# Patient Record
Sex: FEMALE | Race: WHITE | NOT HISPANIC OR LATINO | Employment: UNEMPLOYED | URBAN - METROPOLITAN AREA
[De-identification: names, ages, dates, MRNs, and addresses within clinical notes are randomized per-mention and may not be internally consistent; named-entity substitution may affect disease eponyms.]

---

## 2017-01-06 ENCOUNTER — HOSPITAL ENCOUNTER (OUTPATIENT)
Dept: RADIOLOGY | Facility: OTHER | Age: 27
Discharge: HOME | End: 2017-01-06

## 2017-01-06 ENCOUNTER — ALLSCRIPTS OFFICE VISIT (OUTPATIENT)
Dept: OTHER | Facility: OTHER | Age: 27
End: 2017-01-06

## 2017-01-06 DIAGNOSIS — S52.512A: ICD-10-CM

## 2017-01-06 PROCEDURE — 73110 X-RAY EXAM OF WRIST: CPT

## 2017-01-27 ENCOUNTER — ALLSCRIPTS OFFICE VISIT (OUTPATIENT)
Dept: OTHER | Facility: OTHER | Age: 27
End: 2017-01-27

## 2017-03-27 ENCOUNTER — HOSPITAL ENCOUNTER (OUTPATIENT)
Dept: RADIOLOGY | Facility: OTHER | Age: 27
Discharge: HOME | End: 2017-03-27

## 2017-03-27 ENCOUNTER — ALLSCRIPTS OFFICE VISIT (OUTPATIENT)
Dept: OTHER | Facility: OTHER | Age: 27
End: 2017-03-27

## 2017-03-27 DIAGNOSIS — S52.512A: ICD-10-CM

## 2017-03-27 PROCEDURE — 73110 X-RAY EXAM OF WRIST: CPT

## 2017-03-28 ENCOUNTER — ALLSCRIPTS OFFICE VISIT (OUTPATIENT)
Dept: OTHER | Facility: OTHER | Age: 27
End: 2017-03-28

## 2017-04-05 ENCOUNTER — GENERIC CONVERSION - ENCOUNTER (OUTPATIENT)
Dept: OTHER | Facility: OTHER | Age: 27
End: 2017-04-05

## 2017-05-12 ENCOUNTER — ALLSCRIPTS OFFICE VISIT (OUTPATIENT)
Dept: OTHER | Facility: OTHER | Age: 27
End: 2017-05-12

## 2017-05-12 DIAGNOSIS — Z13.220 ENCOUNTER FOR SCREENING FOR LIPOID DISORDERS: ICD-10-CM

## 2017-05-12 DIAGNOSIS — Z13.1 ENCOUNTER FOR SCREENING FOR DIABETES MELLITUS: ICD-10-CM

## 2017-06-05 ENCOUNTER — ALLSCRIPTS OFFICE VISIT (OUTPATIENT)
Dept: OTHER | Facility: OTHER | Age: 27
End: 2017-06-05

## 2017-08-21 ENCOUNTER — ALLSCRIPTS OFFICE VISIT (OUTPATIENT)
Dept: OTHER | Facility: OTHER | Age: 27
End: 2017-08-21

## 2017-12-20 ENCOUNTER — ALLSCRIPTS OFFICE VISIT (OUTPATIENT)
Dept: OTHER | Facility: OTHER | Age: 27
End: 2017-12-20

## 2018-01-13 VITALS
BODY MASS INDEX: 43.49 KG/M2 | WEIGHT: 261.02 LBS | SYSTOLIC BLOOD PRESSURE: 123 MMHG | DIASTOLIC BLOOD PRESSURE: 89 MMHG | HEART RATE: 75 BPM | HEIGHT: 65 IN

## 2018-01-13 VITALS
TEMPERATURE: 96.2 F | HEIGHT: 65 IN | SYSTOLIC BLOOD PRESSURE: 116 MMHG | OXYGEN SATURATION: 99 % | BODY MASS INDEX: 42.32 KG/M2 | RESPIRATION RATE: 18 BRPM | WEIGHT: 254 LBS | DIASTOLIC BLOOD PRESSURE: 78 MMHG | HEART RATE: 88 BPM

## 2018-01-13 VITALS
BODY MASS INDEX: 43.1 KG/M2 | WEIGHT: 259 LBS | SYSTOLIC BLOOD PRESSURE: 117 MMHG | DIASTOLIC BLOOD PRESSURE: 81 MMHG | HEART RATE: 72 BPM

## 2018-01-14 VITALS
DIASTOLIC BLOOD PRESSURE: 74 MMHG | WEIGHT: 257.25 LBS | SYSTOLIC BLOOD PRESSURE: 109 MMHG | BODY MASS INDEX: 43.92 KG/M2 | HEIGHT: 64 IN | HEART RATE: 86 BPM

## 2018-01-14 VITALS
BODY MASS INDEX: 43.15 KG/M2 | DIASTOLIC BLOOD PRESSURE: 72 MMHG | RESPIRATION RATE: 16 BRPM | HEART RATE: 84 BPM | WEIGHT: 251.38 LBS | SYSTOLIC BLOOD PRESSURE: 112 MMHG

## 2018-01-14 VITALS
HEART RATE: 109 BPM | WEIGHT: 254 LBS | HEIGHT: 65 IN | DIASTOLIC BLOOD PRESSURE: 68 MMHG | BODY MASS INDEX: 42.32 KG/M2 | SYSTOLIC BLOOD PRESSURE: 104 MMHG

## 2018-01-16 NOTE — PROGRESS NOTES
Assessment    1  Depression, major, recurrent, moderate (296 32) (F33 1)   2  Encounter for preventive health examination (V70 0) (Z00 00)   3  Family history of Benign hypertension : Mother   4  No pertinent family history : Father   11  History of  Section Low Transverse   6  Diabetes mellitus screening (V77 1) (Z13 1)   7  Need for lipid screening (V77 91) (Z13 220)    Plan  Depression, major, recurrent, moderate    · Sertraline HCl - 50 MG Oral Tablet; Take 1 tablet daily  Diabetes mellitus screening, Need for lipid screening    · (1) GLUCOSE,  FASTING; Status:Active; Requested for:2017;    · (1) LIPID PANEL, FASTING; Status:Active; Requested for:2017;    Health Maintenance    · Follow-up visit in 1 year Evaluation and Treatment  Follow-up  Status: Hold For -  Scheduling  Requested for: 13EOM9181   · Begin a limited exercise program ; Status:Complete;   Done: 51RZG8140   · Brush your teeth 3 times a day and floss at least once a day ; Status:Complete;   Done:  23HBU0397   · Drink plenty of fluids ; Status:Complete;   Done: 96MSW4303   · Eat a low fat and low cholesterol diet ; Status:Complete;   Done: 05OJE3621   · Eat a normal well-balanced diet ; Status:Complete;   Done: 42QNM4140   · Eat foods that are high in calcium ; Status:Complete;   Done: 96MGD4204   · Stretch and warm up your muscles during the first 10 minutes , then cool down your  muscles for the last 10 minutes of exercise ; Status:Complete;   Done: 76MHV4297   · Use a sun block product with an SPF of 15 or more ; Status:Complete;   Done:  14RXA0582   · Vitamins can help you get daily requirements that your diet may not be giving you ;  Status:Complete;   Done: 89OBS7471   · We recommend routine visits to a dentist ; Status:Complete;   Done: 22YVT1382   · We recommend that you bring your body mass index down to 26 ; Status:Complete;    Done: 52KLC5846   · Call [phone] if: You find a new or different kind of lump in your breast ;  Status:Complete;   Done: 56RPE9694   · Call [phone] if: You have any warning signs of skin cancer ; Status:Complete;    Done: 97STS0058    Discussion/Summary  health maintenance visit Currently, she eats an adequate diet  the risks and benefits of cervical cancer screening were discussed cervical cancer screening is needed every three years Breast cancer screening: the risks and benefits of breast cancer screening were discussed and breast cancer screening is not indicated  Colorectal cancer screening: the risks and benefits of colorectal cancer screening were discussed and colorectal cancer screening is not indicated  Osteoporosis screening: the risks and benefits of osteoporosis screening were discussed and bone mineral density testing is not indicated  Screening lab work includes glucose and lipid profile  The immunizations are up to date  She was advised to be evaluated by an ophthalmologist and a dentist  Advice and education were given regarding nutrition, weight loss, calcium supplements and vitamin D supplements  Patient discussion: discussed with the patient  Chief Complaint  PT here for Annual Wellness Visit      History of Present Illness  HM, Adult Female: The patient is being seen for a health maintenance evaluation  The last health maintenance visit was 1 year(s) ago  General Health: The patient's health since the last visit is described as good  She has regular dental visits  She denies vision problems  She denies hearing loss  Immunizations status: not up to date  Lifestyle:  She consumes a diverse and healthy diet  She does not have any weight concerns  She does not exercise regularly  She does not use tobacco  She denies alcohol use  She denies drug use  Reproductive health:  she reports normal menses  she uses contraception  For contraception, she has an intrauterine device  she is sexually active  pregnancy history: G 1P 2, 2     Screening: cancer screening reviewed and updated  metabolic screening reviewed and updated  risk screening reviewed and updated  Review of Systems    Constitutional: No fever, no chills, feels well, no tiredness, no recent weight gain or weight loss  Eyes: No complaints of eye pain, no red eyes, no eyesight problems, no discharge, no dry eyes, no itching of eyes  ENT: no complaints of earache, no loss of hearing, no nose bleeds, no nasal discharge, no sore throat, no hoarseness  Cardiovascular: No complaints of slow heart rate, no fast heart rate, no chest pain, no palpitations, no leg claudication, no lower extremity edema  Respiratory: No complaints of shortness of breath, no wheezing, no cough, no SOB on exertion, no orthopnea, no PND  Gastrointestinal: No complaints of abdominal pain, no constipation, no nausea or vomiting, no diarrhea, no bloody stools  Genitourinary: No complaints of dysuria, no incontinence, no pelvic pain, no dysmenorrhea, no vaginal discharge or bleeding  Musculoskeletal: No complaints of arthralgias, no myalgias, no joint swelling or stiffness, no limb pain or swelling  Integumentary: No complaints of skin rash or lesions, no itching, no skin wounds, no breast pain or lump  Neurological: No complaints of headache, no confusion, no convulsions, no numbness, no dizziness or fainting, no tingling, no limb weakness, no difficulty walking  Psychiatric: Not suicidal, no sleep disturbance, no anxiety or depression, no change in personality, no emotional problems  Endocrine: No complaints of proptosis, no hot flashes, no muscle weakness, no deepening of the voice, no feelings of weakness  Hematologic/Lymphatic: No complaints of swollen glands, no swollen glands in the neck, does not bleed easily, does not bruise easily        Past Medical History    · History of Closed displaced fracture of styloid process of left radius with delayed healing  (V54 12) (S52 512G)    Surgical History    · History of  Section Low Transverse    Family History  Mother    · Family history of Benign hypertension  Father    · No pertinent family history    Social History    · Current every day smoker (305 1) (F17 200)   · Social alcohol use (Z78 9)    Allergies    1  No Known Drug Allergies    Vitals   Recorded: 23VKS5320 01:18PM   Heart Rate 84   Respiration 16   Systolic 785   Diastolic 76   Height 5 ft 4 72 in   Weight 257 lb    BMI Calculated 43 14   BSA Calculated 2 19     Physical Exam    Constitutional   General appearance: No acute distress, well appearing and well nourished  Head and Face   Head and face: Normal     Palpation of the face and sinuses: No sinus tenderness  Eyes   Conjunctiva and lids: No swelling, erythema or discharge  Pupils and irises: Equal, round, reactive to light  Ophthalmoscopic examination: Normal fundi and optic discs  Ears, Nose, Mouth, and Throat   External inspection of ears and nose: Normal     Otoscopic examination: Tympanic membranes translucent with normal light reflex  Canals patent without erythema  Hearing: Normal     Nasal mucosa, septum, and turbinates: Normal without edema or erythema  Lips, teeth, and gums: Normal, good dentition  Oropharynx: Normal with no erythema, edema, exudate or lesions  Neck   Neck: Supple, symmetric, trachea midline, no masses  Thyroid: Normal, no thyromegaly  Pulmonary   Respiratory effort: No increased work of breathing or signs of respiratory distress  Percussion of chest: Normal     Auscultation of lungs: Clear to auscultation  Cardiovascular   Palpation of heart: Normal PMI, no thrills  Auscultation of heart: Normal rate and rhythm, normal S1 and S2, no murmurs  Examination of extremities for edema and/or varicosities: Normal     Chest   Chest: Normal     Abdomen   Abdomen: Non-tender, no masses  Liver and spleen: No hepatomegaly or splenomegaly  Examination for hernias: No hernia appreciated      Lymphatic Palpation of lymph nodes in neck: No lymphadenopathy  Palpation of lymph nodes in axillae: No lymphadenopathy  Musculoskeletal   Gait and station: Normal     Digits and nails: Normal without clubbing or cyanosis  Joints, bones, and muscles: Normal     Range of motion: Normal     Stability: Normal     Muscle strength/tone: Normal     Skin   Skin and subcutaneous tissue: Normal without rashes or lesions  Palpation of skin and subcutaneous tissue: Normal turgor  Neurologic   Cranial nerves: Cranial nerves II-XII intact  Cortical function: Normal mental status  Reflexes: 2+ and symmetric  Sensation: No sensory loss  Coordination: Normal finger to nose and heel to shin  Psychiatric   Judgment and insight: Normal     Orientation to person, place, and time: Normal     Recent and remote memory: Intact  Mood and affect: Normal        Results/Data  PHQ-2 Adult Depression Screening [id number] 01:20PM User, Stephen     Test Name Result Flag Reference   PHQ-2 Adult Depression Score 0     Over the last two weeks, how often have you been bothered by any of the following problems? Little interest or pleasure in doing things: Not at all - 0  Feeling down, depressed, or hopeless: Not at all - 0   PHQ-2 Adult Depression Screening Negative         Future Appointments    Date/Time Provider Specialty Site   06/05/2017 09:20 AM HIEU Yadav   Orthopedic Surgery [facility]     Signatures   Electronically signed by : Thania Leslie MD; May 12 2017  1:46PM EST                       (Author)

## 2018-01-16 NOTE — MISCELLANEOUS
Provider Comments  Provider Comments:   Our records indicate that you missed an appointment on 04/04/2017  If you have not done so already, please call our office and we will be happy to schedule another appointment for you  If you must cancel your appointment, our office policy requires you to call our office at least 24 hours in advance so that we may utilize your appointment time for another patient who requires our services  If you have any questions, please contact our office at [phone]  Signatures   Electronically signed by :  Helga Jarvis, ; Apr 5 2017  3:43PM EST                       (Author)

## 2018-01-22 VITALS
BODY MASS INDEX: 42.82 KG/M2 | DIASTOLIC BLOOD PRESSURE: 76 MMHG | WEIGHT: 257 LBS | HEART RATE: 84 BPM | HEIGHT: 65 IN | SYSTOLIC BLOOD PRESSURE: 118 MMHG | RESPIRATION RATE: 16 BRPM

## 2018-01-22 VITALS
WEIGHT: 246.5 LBS | DIASTOLIC BLOOD PRESSURE: 82 MMHG | SYSTOLIC BLOOD PRESSURE: 126 MMHG | RESPIRATION RATE: 16 BRPM | HEART RATE: 80 BPM | TEMPERATURE: 97.3 F | BODY MASS INDEX: 42.08 KG/M2 | HEIGHT: 64 IN

## 2018-07-23 PROBLEM — F33.1 DEPRESSION, MAJOR, RECURRENT, MODERATE (HCC): Status: ACTIVE | Noted: 2017-05-12

## 2019-03-25 ENCOUNTER — OFFICE VISIT (OUTPATIENT)
Dept: FAMILY MEDICINE CLINIC | Facility: CLINIC | Age: 29
End: 2019-03-25
Payer: COMMERCIAL

## 2019-03-25 VITALS
HEART RATE: 84 BPM | RESPIRATION RATE: 16 BRPM | OXYGEN SATURATION: 98 % | HEIGHT: 66 IN | DIASTOLIC BLOOD PRESSURE: 68 MMHG | TEMPERATURE: 97.2 F | SYSTOLIC BLOOD PRESSURE: 106 MMHG | WEIGHT: 255 LBS | BODY MASS INDEX: 40.98 KG/M2

## 2019-03-25 DIAGNOSIS — F33.1 DEPRESSION, MAJOR, RECURRENT, MODERATE (HCC): ICD-10-CM

## 2019-03-25 DIAGNOSIS — J01.00 ACUTE NON-RECURRENT MAXILLARY SINUSITIS: Primary | ICD-10-CM

## 2019-03-25 DIAGNOSIS — Z23 NEED FOR VACCINATION: ICD-10-CM

## 2019-03-25 PROCEDURE — 90471 IMMUNIZATION ADMIN: CPT

## 2019-03-25 PROCEDURE — 90686 IIV4 VACC NO PRSV 0.5 ML IM: CPT

## 2019-03-25 PROCEDURE — 99213 OFFICE O/P EST LOW 20 MIN: CPT | Performed by: FAMILY MEDICINE

## 2019-03-25 PROCEDURE — 3008F BODY MASS INDEX DOCD: CPT | Performed by: FAMILY MEDICINE

## 2019-03-25 PROCEDURE — 1036F TOBACCO NON-USER: CPT | Performed by: FAMILY MEDICINE

## 2019-03-25 RX ORDER — MECLIZINE HYDROCHLORIDE 25 MG/1
25 TABLET ORAL EVERY 8 HOURS PRN
Qty: 30 TABLET | Refills: 0 | Status: SHIPPED | OUTPATIENT
Start: 2019-03-25 | End: 2020-07-04

## 2019-03-25 RX ORDER — AMOXICILLIN AND CLAVULANATE POTASSIUM 875; 125 MG/1; MG/1
1 TABLET, FILM COATED ORAL EVERY 12 HOURS SCHEDULED
Qty: 14 TABLET | Refills: 0 | Status: SHIPPED | OUTPATIENT
Start: 2019-03-25 | End: 2019-04-01

## 2019-03-25 NOTE — PROGRESS NOTES
Assessment/Plan:         Diagnoses and all orders for this visit:    Acute non-recurrent maxillary sinusitis  -     amoxicillin-clavulanate (AUGMENTIN) 875-125 mg per tablet; Take 1 tablet by mouth every 12 (twelve) hours for 7 days  -     meclizine (ANTIVERT) 25 mg tablet; Take 1 tablet (25 mg total) by mouth every 8 (eight) hours as needed for dizziness    Need for vaccination  -     SYRINGE/SINGLE-DOSE VIAL: influenza vaccine, [id number], quadrivalent, 0 5 mL, preservative-free (AFLURIA, FLUARIX, FLULAVAL, FLUZONE)    Depression, major, recurrent, moderate (HCC)  -     sertraline (ZOLOFT) 50 mg tablet; Take 1 5 tablets (75 mg total) by mouth daily    Other orders  -     Discontinue: sertraline (ZOLOFT) 50 mg tablet; TAKE ONE AND ONE-HALF TABLETS BY MOUTH DAILY  -     Discontinue: sertraline (ZOLOFT) 50 mg tablet; TAKE ONE AND ONE-HALF TABLETS BY MOUTH DAILY          Subjective:      Patient ID: Joi Shepherd is a 29 y o  female  Dizziness   This is a new problem  The current episode started 1 to 4 weeks ago  The problem occurs constantly  Associated symptoms include congestion, a sore throat and vertigo  Pertinent negatives include no abdominal pain, anorexia, arthralgias, change in bowel habit, chest pain, chills, coughing, diaphoresis, fever, headaches, joint swelling, myalgias, nausea, neck pain, numbness, rash, swollen glands, urinary symptoms, visual change, vomiting or weakness  The symptoms are aggravated by eating and drinking  She has tried NSAIDs, sleep and rest for the symptoms  The treatment provided mild relief  The following portions of the patient's history were reviewed and updated as appropriate: allergies, current medications, past family history, past medical history, past social history, past surgical history and problem list     Review of Systems   Constitutional: Negative for chills, diaphoresis and fever  HENT: Positive for congestion and sore throat      Respiratory: Negative for cough  Cardiovascular: Negative for chest pain  Gastrointestinal: Negative for abdominal pain, anorexia, change in bowel habit, nausea and vomiting  Musculoskeletal: Negative for arthralgias, joint swelling, myalgias and neck pain  Skin: Negative for rash  Neurological: Positive for dizziness and vertigo  Negative for weakness, numbness and headaches  Objective:      /68   Pulse 84   Temp (!) 97 2 °F (36 2 °C)   Resp 16   Ht 5' 6" (1 676 m)   Wt 116 kg (255 lb)   LMP 03/16/2019   SpO2 98%   BMI 41 16 kg/m²          Physical Exam   Constitutional: She appears well-developed and well-nourished  HENT:   Head: Normocephalic and atraumatic  Nose: Rhinorrhea and sinus tenderness present  Right sinus exhibits maxillary sinus tenderness  Mouth/Throat: No oropharyngeal exudate  Eyes: Pupils are equal, round, and reactive to light  EOM are normal    Cardiovascular: Normal rate and regular rhythm  Pulmonary/Chest: Breath sounds normal  No stridor  No respiratory distress  Musculoskeletal: Normal range of motion

## 2019-05-01 ENCOUNTER — OFFICE VISIT (OUTPATIENT)
Dept: FAMILY MEDICINE CLINIC | Facility: CLINIC | Age: 29
End: 2019-05-01
Payer: COMMERCIAL

## 2019-05-01 VITALS
RESPIRATION RATE: 16 BRPM | HEART RATE: 67 BPM | WEIGHT: 260.8 LBS | BODY MASS INDEX: 41.91 KG/M2 | TEMPERATURE: 97.5 F | SYSTOLIC BLOOD PRESSURE: 120 MMHG | OXYGEN SATURATION: 99 % | HEIGHT: 66 IN | DIASTOLIC BLOOD PRESSURE: 88 MMHG

## 2019-05-01 DIAGNOSIS — Z00.00 ANNUAL PHYSICAL EXAM: Primary | ICD-10-CM

## 2019-05-01 PROCEDURE — 99395 PREV VISIT EST AGE 18-39: CPT | Performed by: FAMILY MEDICINE

## 2019-12-17 ENCOUNTER — TELEPHONE (OUTPATIENT)
Dept: FAMILY MEDICINE CLINIC | Facility: CLINIC | Age: 29
End: 2019-12-17

## 2019-12-17 NOTE — TELEPHONE ENCOUNTER
Patient called and had to cancel her sick appointment with you today due to her kids being sick and she wanted to know If you can squeeze her in tomorrow morning   Please advise

## 2019-12-18 ENCOUNTER — OFFICE VISIT (OUTPATIENT)
Dept: FAMILY MEDICINE CLINIC | Facility: CLINIC | Age: 29
End: 2019-12-18
Payer: COMMERCIAL

## 2019-12-18 VITALS
OXYGEN SATURATION: 98 % | HEART RATE: 54 BPM | HEIGHT: 66 IN | RESPIRATION RATE: 18 BRPM | DIASTOLIC BLOOD PRESSURE: 80 MMHG | BODY MASS INDEX: 41.5 KG/M2 | WEIGHT: 258.2 LBS | TEMPERATURE: 97.2 F | SYSTOLIC BLOOD PRESSURE: 116 MMHG

## 2019-12-18 DIAGNOSIS — F33.1 DEPRESSION, MAJOR, RECURRENT, MODERATE (HCC): ICD-10-CM

## 2019-12-18 DIAGNOSIS — Z23 FLU VACCINE NEED: ICD-10-CM

## 2019-12-18 DIAGNOSIS — J20.9 ACUTE BRONCHITIS, UNSPECIFIED ORGANISM: Primary | ICD-10-CM

## 2019-12-18 PROCEDURE — 90471 IMMUNIZATION ADMIN: CPT

## 2019-12-18 PROCEDURE — 1036F TOBACCO NON-USER: CPT | Performed by: FAMILY MEDICINE

## 2019-12-18 PROCEDURE — 3008F BODY MASS INDEX DOCD: CPT | Performed by: FAMILY MEDICINE

## 2019-12-18 PROCEDURE — 99213 OFFICE O/P EST LOW 20 MIN: CPT | Performed by: FAMILY MEDICINE

## 2019-12-18 PROCEDURE — 90686 IIV4 VACC NO PRSV 0.5 ML IM: CPT

## 2019-12-18 RX ORDER — AZITHROMYCIN 250 MG/1
TABLET, FILM COATED ORAL
Qty: 6 TABLET | Refills: 0 | Status: SHIPPED | OUTPATIENT
Start: 2019-12-18 | End: 2019-12-22

## 2019-12-18 RX ORDER — DEXTROMETHORPHAN HYDROBROMIDE AND PROMETHAZINE HYDROCHLORIDE 15; 6.25 MG/5ML; MG/5ML
5 SOLUTION ORAL 4 TIMES DAILY PRN
Qty: 240 ML | Refills: 0 | Status: SHIPPED | OUTPATIENT
Start: 2019-12-18 | End: 2020-07-04

## 2019-12-18 NOTE — PROGRESS NOTES
Assessment/Plan:    No problem-specific Assessment & Plan notes found for this encounter  Diagnoses and all orders for this visit:    Acute bronchitis, unspecified organism  -     azithromycin (ZITHROMAX) 250 mg tablet; 2 tabs x 1 day, 1 tab x 4 days  -     Promethazine-DM (PHENERGAN-DM) 6 25-15 mg/5 mL oral syrup; Take 5 mL by mouth 4 (four) times a day as needed for cough    Depression, major, recurrent, moderate (HCC)  -     sertraline (ZOLOFT) 50 mg tablet; Take 1 5 tablets (75 mg total) by mouth daily    Flu vaccine need  -     influenza vaccine, [id number], quadrivalent, 0 5 mL, preservative-free, for adult and pediatric patients 6 mos+ (AFLURIA, FLUARIX, FLULAVAL, FLUZONE)        BMI Counseling: Body mass index is 41 67 kg/m²  The BMI is above normal  Nutrition recommendations include decreasing portion sizes and encouraging healthy choices of fruits and vegetables  No pharmacotherapy was ordered  Subjective:      Patient ID: Frederick Cano is a 34 y o  female  URI    This is a new problem  The current episode started 1 to 4 weeks ago  The problem has been unchanged  There has been no fever  Associated symptoms include congestion, coughing and sinus pain  Pertinent negatives include no abdominal pain, chest pain, diarrhea, dysuria, ear pain, headaches, joint pain, joint swelling, nausea, neck pain, plugged ear sensation, rash, rhinorrhea, sneezing, sore throat, swollen glands, vomiting or wheezing  She has tried nothing for the symptoms  The treatment provided mild relief  The following portions of the patient's history were reviewed and updated as appropriate: allergies, current medications, past family history, past medical history, past social history, past surgical history and problem list     Review of Systems   HENT: Positive for congestion and sinus pain  Negative for ear pain, rhinorrhea, sneezing and sore throat  Respiratory: Positive for cough  Negative for wheezing  Cardiovascular: Negative for chest pain  Gastrointestinal: Negative for abdominal pain, diarrhea, nausea and vomiting  Genitourinary: Negative for dysuria  Musculoskeletal: Negative for joint pain and neck pain  Skin: Negative for rash  Neurological: Negative for headaches  Objective:      /80 (BP Location: Right arm, Patient Position: Sitting, Cuff Size: Large)   Pulse (!) 54   Temp (!) 97 2 °F (36 2 °C) (Tympanic)   Resp 18   Ht 5' 6" (1 676 m)   Wt 117 kg (258 lb 3 2 oz)   SpO2 98%   BMI 41 67 kg/m²          Physical Exam   Constitutional: She is oriented to person, place, and time  She appears well-developed and well-nourished  HENT:   Head: Normocephalic  Mouth/Throat: No oropharyngeal exudate  Neck: No tracheal deviation present  No thyromegaly present  Cardiovascular: Normal rate and regular rhythm  Exam reveals no friction rub  Pulmonary/Chest: Effort normal and breath sounds normal  No respiratory distress  Neurological: She is alert and oriented to person, place, and time

## 2020-07-04 ENCOUNTER — HOSPITAL ENCOUNTER (EMERGENCY)
Facility: HOSPITAL | Age: 30
Discharge: HOME | End: 2020-07-04
Attending: EMERGENCY MEDICINE | Admitting: EMERGENCY MEDICINE
Payer: COMMERCIAL

## 2020-07-04 VITALS
TEMPERATURE: 97.5 F | WEIGHT: 260.14 LBS | BODY MASS INDEX: 41.81 KG/M2 | HEIGHT: 66 IN | RESPIRATION RATE: 20 BRPM | OXYGEN SATURATION: 100 % | SYSTOLIC BLOOD PRESSURE: 116 MMHG | DIASTOLIC BLOOD PRESSURE: 75 MMHG | HEART RATE: 54 BPM

## 2020-07-04 DIAGNOSIS — R11.2 NAUSEA AND VOMITING, INTRACTABILITY OF VOMITING NOT SPECIFIED, UNSPECIFIED VOMITING TYPE: ICD-10-CM

## 2020-07-04 DIAGNOSIS — R55 SYNCOPE: Primary | ICD-10-CM

## 2020-07-04 LAB
ANION GAP SERPL CALCULATED.3IONS-SCNC: 5 MMOL/L (ref 4–13)
B-HCG SERPL-ACNC: <2 MIU/ML
BASOPHILS # BLD AUTO: 0.03 THOUSANDS/ΜL (ref 0–0.1)
BASOPHILS NFR BLD AUTO: 0 % (ref 0–1)
BUN SERPL-MCNC: 13 MG/DL (ref 5–25)
CALCIUM SERPL-MCNC: 9.7 MG/DL (ref 8.3–10.1)
CHLORIDE SERPL-SCNC: 100 MMOL/L (ref 100–108)
CO2 SERPL-SCNC: 28 MMOL/L (ref 21–32)
CREAT SERPL-MCNC: 0.95 MG/DL (ref 0.6–1.3)
EOSINOPHIL # BLD AUTO: 0.03 THOUSAND/ΜL (ref 0–0.61)
EOSINOPHIL NFR BLD AUTO: 0 % (ref 0–6)
ERYTHROCYTE [DISTWIDTH] IN BLOOD BY AUTOMATED COUNT: 14 % (ref 11.6–15.1)
GFR SERPL CREATININE-BSD FRML MDRD: 81 ML/MIN/1.73SQ M
GLUCOSE SERPL-MCNC: 103 MG/DL (ref 65–140)
HCT VFR BLD AUTO: 40.2 % (ref 34.8–46.1)
HGB BLD-MCNC: 12.4 G/DL (ref 11.5–15.4)
IMM GRANULOCYTES # BLD AUTO: 0.05 THOUSAND/UL (ref 0–0.2)
IMM GRANULOCYTES NFR BLD AUTO: 0 % (ref 0–2)
LYMPHOCYTES # BLD AUTO: 1.13 THOUSANDS/ΜL (ref 0.6–4.47)
LYMPHOCYTES NFR BLD AUTO: 9 % (ref 14–44)
MCH RBC QN AUTO: 26.8 PG (ref 26.8–34.3)
MCHC RBC AUTO-ENTMCNC: 30.8 G/DL (ref 31.4–37.4)
MCV RBC AUTO: 87 FL (ref 82–98)
MONOCYTES # BLD AUTO: 0.35 THOUSAND/ΜL (ref 0.17–1.22)
MONOCYTES NFR BLD AUTO: 3 % (ref 4–12)
NEUTROPHILS # BLD AUTO: 10.6 THOUSANDS/ΜL (ref 1.85–7.62)
NEUTS SEG NFR BLD AUTO: 88 % (ref 43–75)
NRBC BLD AUTO-RTO: 0 /100 WBCS
PLATELET # BLD AUTO: 237 THOUSANDS/UL (ref 149–390)
PMV BLD AUTO: 11.4 FL (ref 8.9–12.7)
POTASSIUM SERPL-SCNC: 4.7 MMOL/L (ref 3.5–5.3)
RBC # BLD AUTO: 4.62 MILLION/UL (ref 3.81–5.12)
SODIUM SERPL-SCNC: 133 MMOL/L (ref 136–145)
WBC # BLD AUTO: 12.19 THOUSAND/UL (ref 4.31–10.16)

## 2020-07-04 PROCEDURE — 96360 HYDRATION IV INFUSION INIT: CPT

## 2020-07-04 PROCEDURE — 99284 EMERGENCY DEPT VISIT MOD MDM: CPT | Performed by: EMERGENCY MEDICINE

## 2020-07-04 PROCEDURE — 36415 COLL VENOUS BLD VENIPUNCTURE: CPT | Performed by: EMERGENCY MEDICINE

## 2020-07-04 PROCEDURE — 80048 BASIC METABOLIC PNL TOTAL CA: CPT | Performed by: EMERGENCY MEDICINE

## 2020-07-04 PROCEDURE — 84702 CHORIONIC GONADOTROPIN TEST: CPT | Performed by: EMERGENCY MEDICINE

## 2020-07-04 PROCEDURE — 93005 ELECTROCARDIOGRAM TRACING: CPT

## 2020-07-04 PROCEDURE — 85025 COMPLETE CBC W/AUTO DIFF WBC: CPT | Performed by: EMERGENCY MEDICINE

## 2020-07-04 PROCEDURE — 96361 HYDRATE IV INFUSION ADD-ON: CPT

## 2020-07-04 PROCEDURE — 99284 EMERGENCY DEPT VISIT MOD MDM: CPT

## 2020-07-04 RX ORDER — ONDANSETRON 4 MG/1
8 TABLET, ORALLY DISINTEGRATING ORAL EVERY 8 HOURS PRN
Qty: 10 TABLET | Refills: 3 | Status: SHIPPED | OUTPATIENT
Start: 2020-07-04 | End: 2020-11-06 | Stop reason: ALTCHOICE

## 2020-07-04 RX ORDER — SODIUM CHLORIDE, SODIUM LACTATE, POTASSIUM CHLORIDE, CALCIUM CHLORIDE 600; 310; 30; 20 MG/100ML; MG/100ML; MG/100ML; MG/100ML
500 INJECTION, SOLUTION INTRAVENOUS CONTINUOUS
Status: DISCONTINUED | OUTPATIENT
Start: 2020-07-04 | End: 2020-07-04 | Stop reason: HOSPADM

## 2020-07-04 RX ADMIN — SODIUM CHLORIDE, SODIUM LACTATE, POTASSIUM CHLORIDE, AND CALCIUM CHLORIDE 500 ML: .6; .31; .03; .02 INJECTION, SOLUTION INTRAVENOUS at 16:14

## 2020-07-04 NOTE — DISCHARGE INSTRUCTIONS
DIAGNOSIS: SYNCOPE- PASSING OUT EPISODE- WITH NAUSEA/ VOMITING    -  ACTIVITY AS TOLERATED    - PLEASE START DIET WITH FREQUENT SIPS OF LIQUIDS- ADVANCING GRADUALLY TO MORE SOLID FOODS AS TOLERATED     - FOR NAY NAUSEA/ VOMITING: ZOFRAN 2 TABLETS DISSOLVE IN  THE MOUTH  EVERY 6-8 HRS AS NEEDED     - PLEASE RETURN TO  THE ER FOR ANY FURTHER EPISODES OF PASSING OUT- ANY PERSISTENT VOMITING/ ANY BLOODY /COFFEE GROUND VOMITUS OR ANY NEW/ WORSENING/CONCERNING SYMPTOMS TO YOU

## 2020-07-04 NOTE — ED PROCEDURE NOTE
PROCEDURE  ECG 12 Lead Documentation Only  Date/Time: 7/4/2020 4:32 PM  Performed by: Eleanor Benjamin MD  Authorized by: Eleanor Benjamin MD     Indications / Diagnosis:  Syncope  ECG reviewed by me, the ED Provider: yes    Patient location:  ED  Previous ECG:     Previous ECG:  Unavailable    Comparison to cardiac monitor: Yes    Interpretation:     Interpretation: non-specific    Rate:     ECG rate:  62    ECG rate assessment: normal    Rhythm:     Rhythm: sinus rhythm      Rhythm comment:  Sinus arrthymia  Ectopy:     Ectopy: none    QRS:     QRS axis:  Normal    QRS intervals:  Normal  Conduction:     Conduction: normal    ST segments:     ST segments:  Normal  T waves:     T waves: flattening      Flattening:  III and V1  Q waves:     Q waves:  V1, V2 and V3  Other findings:     Other findings: poor R wave progression and U wave    Comments:      No ecg signs of ischemia/ injury / r heart strain / patience/pericarditis- no ecg signs of short- long qtc- / wpw/ brugada syndrome/ hcm/ epsilon waves         Eleanor Benjamin MD  07/04/20 7113

## 2020-07-04 NOTE — ED PROVIDER NOTES
History  Chief Complaint   Patient presents with    Syncope     Pt pressents to ED from home after having syncopal episode while sitting down, then vomiting  Pt stated this has happened before but did not evaluate for it  Pt (+) hx anxiety  Pt unsure it head strike, (+) LOC, (-) thinners  34 yr female in normal state of health with no recent illness/ - was siting down when did not feel right--  Then as per pt thru family had several brief passing  out episodes- with no trauma- no sz activity - did vomit several times afterward -- now b ack to baseline -- has no comps-- pt denies any ill contacts/ travel-- prior  To syncopal episodes- pt denied any sudden severe headache/neck pain- no cp/sogf/palp/no abd/back pain/ vag bleeding      History provided by:  Patient and relative   used: No    Syncope   Episode history:  Multiple  Most recent episode: Today  Duration:  1 hour  Progression:  Resolved  Associated symptoms: nausea and vomiting    Associated symptoms: no diaphoresis, no dizziness, no fever, no headaches, no seizures and no weakness        Prior to Admission Medications   Prescriptions Last Dose Informant Patient Reported? Taking?   multivitamin (THERAGRAN) TABS   Yes Yes   Sig: Take 1 tablet by mouth daily   sertraline (ZOLOFT) 50 mg tablet   Yes Yes   Sig: Take 50 mg by mouth daily at bedtime      Facility-Administered Medications: None       Past Medical History:   Diagnosis Date    Closed disp fx of styloid process of left radius with routine healing     Last Assessed [id number]    Psychiatric disorder     "mood swings"       Past Surgical History:   Procedure Laterality Date     SECTION      WISDOM TOOTH EXTRACTION      all 4       Family History   Problem Relation Age of Onset    Hypertension Mother     No Known Problems Father      I have reviewed and agree with the history as documented      E-Cigarette/Vaping    E-Cigarette Use Current Every Day User     Cartridges/Day 3ml      E-Cigarette/Vaping Substances     Social History     Tobacco Use    Smoking status: Never Smoker    Smokeless tobacco: Never Used   Substance Use Topics    Alcohol use: No    Drug use: No       Review of Systems   Constitutional: Positive for appetite change  Negative for activity change, chills, diaphoresis, fatigue, fever and unexpected weight change  HENT: Negative  Eyes: Negative  Respiratory: Negative  Cardiovascular: Positive for syncope  Gastrointestinal: Positive for nausea and vomiting  Negative for abdominal distention, abdominal pain, anal bleeding, blood in stool, constipation, diarrhea and rectal pain  Endocrine: Negative  Genitourinary: Negative  Musculoskeletal: Negative  Skin: Negative  Allergic/Immunologic: Negative  Neurological: Positive for syncope  Negative for dizziness, tremors, seizures, facial asymmetry, speech difficulty, weakness, light-headedness, numbness and headaches  Hematological: Negative  Psychiatric/Behavioral: Negative  Physical Exam  Physical Exam   Constitutional: She is oriented to person, place, and time  She appears well-developed and well-nourished  No distress  avss- pulse ox 100 % on ra- interpretation is normal- no intervention - well appeaRING- IN NAD    HENT:   Head: Normocephalic and atraumatic  Right Ear: External ear normal    Left Ear: External ear normal    Nose: Nose normal    Mouth/Throat: Oropharynx is clear and moist  No oropharyngeal exudate  Eyes: Pupils are equal, round, and reactive to light  Conjunctivae and EOM are normal  Right eye exhibits no discharge  Left eye exhibits no discharge  No scleral icterus  MM PINK   Neck: Normal range of motion  Neck supple  No JVD present  No tracheal deviation present  No thyromegaly present  NO PMT C/T/L/S SPINE   Cardiovascular: Normal rate, regular rhythm, normal heart sounds and intact distal pulses   Exam reveals no gallop and no friction rub  No murmur heard  Pulmonary/Chest: Effort normal and breath sounds normal  No stridor  No respiratory distress  She has no wheezes  She has no rales  She exhibits no tenderness  Abdominal: Soft  Bowel sounds are normal  She exhibits no distension and no mass  There is no tenderness  There is no rebound and no guarding  No hernia  SOFT NT/ND- NO HSM/ NO CVA TENDERNESS/ NO PERITONEAL SIGNS-    Musculoskeletal: Normal range of motion  She exhibits no edema, tenderness or deformity  EQUAL BILATERAL RADIAL/DP PULSES- NO BLE EDEMA/CALF TENDERNESS/ASYM/ ERYTHEMA   Lymphadenopathy:     She has no cervical adenopathy  Neurological: She is alert and oriented to person, place, and time  No cranial nerve deficit or sensory deficit  She exhibits normal muscle tone  Coordination normal    NORMAL NON FOCAL NEURO EXAM    Skin: Skin is warm  Capillary refill takes less than 2 seconds  She is not diaphoretic  Psychiatric: She has a normal mood and affect  Her behavior is normal  Judgment and thought content normal    Nursing note and vitals reviewed        Vital Signs  ED Triage Vitals [07/04/20 1523]   Temperature Pulse Respirations Blood Pressure SpO2   97 5 °F (36 4 °C) 75 20 116/75 100 %      Temp Source Heart Rate Source Patient Position - Orthostatic VS BP Location FiO2 (%)   Oral Monitor Sitting Right arm --      Pain Score       --           Vitals:    07/04/20 1523 07/04/20 1545   BP: 116/75    Pulse: 75 60   Patient Position - Orthostatic VS: Sitting          Visual Acuity      ED Medications  Medications   lactated ringers infusion 500 mL (500 mL Intravenous New Bag 7/4/20 1614)       Diagnostic Studies  Results Reviewed     Procedure Component Value Units Date/Time    CBC and differential [[id number]]  (Abnormal) Collected:  07/04/20 1733    Lab Status:  Final result Specimen:  Blood from Arm, Left Updated:  07/04/20 1746     WBC 12 19 Thousand/uL      RBC 4 62 Million/uL      Hemoglobin 12 4 g/dL Hematocrit 40 2 %      MCV 87 fL      MCH 26 8 pg      MCHC 30 8 g/dL      RDW 14 0 %      MPV 11 4 fL      Platelets 932 Thousands/uL      nRBC 0 /100 WBCs      Neutrophils Relative 88 %      Immat GRANS % 0 %      Lymphocytes Relative 9 %      Monocytes Relative 3 %      Eosinophils Relative 0 %      Basophils Relative 0 %      Neutrophils Absolute 10 60 Thousands/µL      Immature Grans Absolute 0 05 Thousand/uL      Lymphocytes Absolute 1 13 Thousands/µL      Monocytes Absolute 0 35 Thousand/µL      Eosinophils Absolute 0 03 Thousand/µL      Basophils Absolute 0 03 Thousands/µL     hCG, quantitative [[id number]] Collected:  07/04/20 1733    Lab Status: In process Specimen:  Blood from Arm, Left Updated:  07/04/20 8396    Basic metabolic panel [[id number]] Collected:  07/04/20 1733    Lab Status: In process Specimen:  Blood from Arm, Left Updated:  07/04/20 1736                 No orders to display              Procedures  Procedures         ED Course  ED Course as of Jul 04 1851   Sat Jul 04, 2020   1719 Er md note- pt- re-evaluated- feels improved- will start po challenge       1733 Er md note- er md call lab - lab states never received specimens- nurse will resend       4982 - ER MD NOTE- PT- TOLERATED PO CHALLEGNE IN ER WITH NO COMPS- TOLERATED AMBULATION IN ER WITH NO COMPS- WILL D/C                                                MDM      Disposition  Final diagnoses:   None     ED Disposition     None      Follow-up Information    None         Patient's Medications   Discharge Prescriptions    No medications on file     No discharge procedures on file      PDMP Review     None          ED Provider  Electronically Signed by           Ivett Piña MD  07/04/20 8303

## 2020-07-05 LAB
ATRIAL RATE: 62 BPM
P AXIS: 43 DEGREES
PR INTERVAL: 166 MS
QRS AXIS: 43 DEGREES
QRSD INTERVAL: 88 MS
QT INTERVAL: 404 MS
QTC INTERVAL: 410 MS
T WAVE AXIS: 36 DEGREES
VENTRICULAR RATE: 62 BPM

## 2020-07-05 PROCEDURE — 93010 ELECTROCARDIOGRAM REPORT: CPT | Performed by: INTERNAL MEDICINE

## 2020-09-03 DIAGNOSIS — F33.1 DEPRESSION, MAJOR, RECURRENT, MODERATE (HCC): Primary | ICD-10-CM

## 2020-09-03 NOTE — TELEPHONE ENCOUNTER
Medication: sertraline (ZOLOFT      Dosage: 50 mg tablet       How Often: Take 50 mg by mouth daily at bedtime  Quantity:  30  Last Office Visit: 12/18/19  Next Office Visit: 11/06/20  Last refilled: 07/04/20  How many pills left: 2  [facility] [address] [place] [address]  Phone: [phone] Fax: [phone]

## 2020-09-30 DIAGNOSIS — F33.1 DEPRESSION, MAJOR, RECURRENT, MODERATE (HCC): ICD-10-CM

## 2020-10-12 DIAGNOSIS — F33.1 DEPRESSION, MAJOR, RECURRENT, MODERATE (HCC): ICD-10-CM

## 2020-11-04 DIAGNOSIS — F33.1 DEPRESSION, MAJOR, RECURRENT, MODERATE (HCC): ICD-10-CM

## 2020-11-06 ENCOUNTER — OFFICE VISIT (OUTPATIENT)
Dept: FAMILY MEDICINE CLINIC | Facility: CLINIC | Age: 30
End: 2020-11-06
Payer: COMMERCIAL

## 2020-11-06 VITALS
OXYGEN SATURATION: 98 % | RESPIRATION RATE: 16 BRPM | DIASTOLIC BLOOD PRESSURE: 78 MMHG | TEMPERATURE: 96.6 F | HEART RATE: 79 BPM | BODY MASS INDEX: 46.08 KG/M2 | HEIGHT: 65 IN | SYSTOLIC BLOOD PRESSURE: 102 MMHG | WEIGHT: 276.6 LBS

## 2020-11-06 DIAGNOSIS — Z00.00 ANNUAL PHYSICAL EXAM: Primary | ICD-10-CM

## 2020-11-06 DIAGNOSIS — Z23 FLU VACCINE NEED: ICD-10-CM

## 2020-11-06 DIAGNOSIS — Z11.4 ENCOUNTER FOR SCREENING FOR HIV: ICD-10-CM

## 2020-11-06 DIAGNOSIS — F33.1 DEPRESSION, MAJOR, RECURRENT, MODERATE (HCC): ICD-10-CM

## 2020-11-06 PROCEDURE — 1036F TOBACCO NON-USER: CPT | Performed by: FAMILY MEDICINE

## 2020-11-06 PROCEDURE — 90471 IMMUNIZATION ADMIN: CPT

## 2020-11-06 PROCEDURE — 90686 IIV4 VACC NO PRSV 0.5 ML IM: CPT

## 2020-11-06 PROCEDURE — 3008F BODY MASS INDEX DOCD: CPT | Performed by: FAMILY MEDICINE

## 2020-11-06 PROCEDURE — 99395 PREV VISIT EST AGE 18-39: CPT | Performed by: FAMILY MEDICINE

## 2020-11-06 PROCEDURE — 3725F SCREEN DEPRESSION PERFORMED: CPT | Performed by: FAMILY MEDICINE

## 2020-11-06 RX ORDER — SERTRALINE HYDROCHLORIDE 100 MG/1
100 TABLET, FILM COATED ORAL
Qty: 90 TABLET | Refills: 0 | Status: SHIPPED | OUTPATIENT
Start: 2020-11-06 | End: 2021-01-27

## 2020-11-13 ENCOUNTER — LAB (OUTPATIENT)
Dept: LAB | Facility: HOSPITAL | Age: 30
End: 2020-11-13
Payer: COMMERCIAL

## 2020-11-13 DIAGNOSIS — Z11.4 ENCOUNTER FOR SCREENING FOR HIV: ICD-10-CM

## 2020-11-13 DIAGNOSIS — Z00.00 ANNUAL PHYSICAL EXAM: ICD-10-CM

## 2020-11-13 LAB
CHOLEST SERPL-MCNC: 169 MG/DL
HDLC SERPL-MCNC: 51 MG/DL
LDLC SERPL CALC-MCNC: 100 MG/DL (ref 0–100)
TRIGL SERPL-MCNC: 91.8 MG/DL

## 2020-11-13 PROCEDURE — 80061 LIPID PANEL: CPT

## 2020-11-13 PROCEDURE — 36415 COLL VENOUS BLD VENIPUNCTURE: CPT

## 2020-11-13 PROCEDURE — 87389 HIV-1 AG W/HIV-1&-2 AB AG IA: CPT

## 2020-11-15 LAB — HIV 1+2 AB+HIV1 P24 AG SERPL QL IA: NORMAL

## 2021-01-27 DIAGNOSIS — F33.1 DEPRESSION, MAJOR, RECURRENT, MODERATE (HCC): ICD-10-CM

## 2021-01-27 RX ORDER — SERTRALINE HYDROCHLORIDE 100 MG/1
TABLET, FILM COATED ORAL
Qty: 90 TABLET | Refills: 3 | Status: SHIPPED | OUTPATIENT
Start: 2021-01-27 | End: 2021-02-22 | Stop reason: SDUPTHER

## 2021-02-19 ENCOUNTER — OFFICE VISIT (OUTPATIENT)
Dept: FAMILY MEDICINE CLINIC | Facility: CLINIC | Age: 31
End: 2021-02-19
Payer: COMMERCIAL

## 2021-02-19 VITALS
SYSTOLIC BLOOD PRESSURE: 100 MMHG | WEIGHT: 288.8 LBS | TEMPERATURE: 98 F | HEIGHT: 65 IN | OXYGEN SATURATION: 98 % | BODY MASS INDEX: 48.12 KG/M2 | DIASTOLIC BLOOD PRESSURE: 78 MMHG | HEART RATE: 80 BPM

## 2021-02-19 DIAGNOSIS — E66.01 CLASS 3 SEVERE OBESITY DUE TO EXCESS CALORIES WITHOUT SERIOUS COMORBIDITY WITH BODY MASS INDEX (BMI) OF 45.0 TO 49.9 IN ADULT (HCC): Primary | ICD-10-CM

## 2021-02-19 DIAGNOSIS — F51.01 PRIMARY INSOMNIA: ICD-10-CM

## 2021-02-19 DIAGNOSIS — F33.1 DEPRESSION, MAJOR, RECURRENT, MODERATE (HCC): ICD-10-CM

## 2021-02-19 PROBLEM — E66.09 OBESITY DUE TO EXCESS CALORIES WITHOUT SERIOUS COMORBIDITY: Status: ACTIVE | Noted: 2021-02-19

## 2021-02-19 PROCEDURE — 99214 OFFICE O/P EST MOD 30 MIN: CPT | Performed by: FAMILY MEDICINE

## 2021-02-19 RX ORDER — HYDROXYZINE HYDROCHLORIDE 25 MG/1
25 TABLET, FILM COATED ORAL
Qty: 30 TABLET | Refills: 0 | Status: SHIPPED | OUTPATIENT
Start: 2021-02-19 | End: 2021-05-04 | Stop reason: ALTCHOICE

## 2021-02-19 NOTE — PROGRESS NOTES
Assessment/Plan:    No problem-specific Assessment & Plan notes found for this encounter  Diagnoses and all orders for this visit:    Class 3 severe obesity due to excess calories without serious comorbidity with body mass index (BMI) of 45 0 to 49 9 in adult [facility])  Comments:  to start with medical weight loss management   then possible weight loss surgery  Orders:  -     Ambulatory referral to Weight Management; Future    Depression, major, recurrent, moderate (HCC)  Comments:  stable on zoloft    Primary insomnia  -     hydrOXYzine HCL (ATARAX) 25 mg tablet; Take 1 tablet (25 mg total) by mouth daily at bedtime          Subjective:      Patient ID: Verito Martinez is a 27 y o  female  Here for follow up  Wants to get weight loss surgery   She is trying to eat better but not losing weight   Having trouble falling asleep  Tried melatonin which didn't help    Anxiety  Presents for follow-up visit  Symptoms include depressed mood and nervous/anxious behavior  Patient reports no chest pain, compulsions, confusion, decreased concentration, dizziness, dry mouth, excessive worry, feeling of choking, hyperventilation, impotence, insomnia, irritability, malaise, muscle tension, nausea, obsessions, palpitations, panic, restlessness, shortness of breath or suicidal ideas  Symptoms occur occasionally  The quality of sleep is good  Compliance with medications is %  The following portions of the patient's history were reviewed and updated as appropriate: allergies, current medications, past family history, past medical history, past social history, past surgical history and problem list     Review of Systems   Constitutional: Negative for irritability  Respiratory: Negative for shortness of breath  Cardiovascular: Negative for chest pain and palpitations  Gastrointestinal: Negative for nausea  Genitourinary: Negative for impotence  Neurological: Negative for dizziness     Psychiatric/Behavioral: Negative for confusion, decreased concentration and suicidal ideas  The patient is nervous/anxious  The patient does not have insomnia  Objective:      /78 (BP Location: Left arm, Patient Position: Sitting, Cuff Size: Large)   Pulse 80   Temp 98 °F (36 7 °C) (Tympanic)   Ht 5' 5 47" (1 663 m)   Wt 131 kg (288 lb 12 8 oz)   SpO2 98%   BMI 47 37 kg/m²          Physical Exam  Constitutional:       Appearance: She is obese  Cardiovascular:      Rate and Rhythm: Normal rate and regular rhythm  Pulses: Normal pulses  Heart sounds: Normal heart sounds  Neurological:      General: No focal deficit present  Mental Status: She is oriented to person, place, and time

## 2021-02-22 DIAGNOSIS — F33.1 DEPRESSION, MAJOR, RECURRENT, MODERATE (HCC): ICD-10-CM

## 2021-02-22 NOTE — TELEPHONE ENCOUNTER
Medication: sertraline (ZOLOFT)      Dosage: 100 mg tablet       Our system does not let us see details for the meds  [facility]  Pt is asking if you can do two bottles for her?

## 2021-02-23 RX ORDER — SERTRALINE HYDROCHLORIDE 100 MG/1
100 TABLET, FILM COATED ORAL
Qty: 90 TABLET | Refills: 3 | Status: SHIPPED | OUTPATIENT
Start: 2021-02-23 | End: 2021-02-26 | Stop reason: SDUPTHER

## 2021-02-26 ENCOUNTER — TELEPHONE (OUTPATIENT)
Dept: FAMILY MEDICINE CLINIC | Facility: CLINIC | Age: 31
End: 2021-02-26

## 2021-02-26 DIAGNOSIS — F33.1 DEPRESSION, MAJOR, RECURRENT, MODERATE (HCC): ICD-10-CM

## 2021-02-26 RX ORDER — SERTRALINE HYDROCHLORIDE 100 MG/1
100 TABLET, FILM COATED ORAL
Qty: 90 TABLET | Refills: 3 | Status: SHIPPED | OUTPATIENT
Start: 2021-02-26 | End: 2021-05-04 | Stop reason: SDUPTHER

## 2021-02-26 NOTE — TELEPHONE ENCOUNTER
Pt needed her sertraline refilled and I [facility] out old [facility] and put in the new one for some reason it went to [facility] it needs to go to [facility] that is on file [facility] they are there now   Thank you

## 2021-03-01 ENCOUNTER — CONSULT (OUTPATIENT)
Dept: BARIATRICS | Facility: CLINIC | Age: 31
End: 2021-03-01
Payer: COMMERCIAL

## 2021-03-01 DIAGNOSIS — E66.01 CLASS 3 SEVERE OBESITY DUE TO EXCESS CALORIES WITHOUT SERIOUS COMORBIDITY WITH BODY MASS INDEX (BMI) OF 45.0 TO 49.9 IN ADULT (HCC): ICD-10-CM

## 2021-03-01 DIAGNOSIS — R63.5 ABNORMAL WEIGHT GAIN: ICD-10-CM

## 2021-03-01 PROCEDURE — WMDI30

## 2021-03-01 PROCEDURE — RECHECK

## 2021-03-01 NOTE — PROGRESS NOTES
Weight Management Medical Nutrition Assessment  Iris Warren was here today for medical meal planning  Today she weighs 289 8 lbs and has a goal weight of 190 lbs  She was referred by her PCP, but was under the impression that she was seeing me to discuss surgery  I gave her a small overview the surgical process, and also provided her with a meal plan and discussed portions, limiting carbs, hydration, and exercise  She seemed to give me answers that felt like that is what she thought I wanted to hear  I am not sure if she is ready to make changes towards her weight loss goal at this time  She told me that she "has tried everything and nothing ever works" but could not give me any specifics on what she has tried in the past (ie  Weight watchers, Blake Artem, meds, RD education, etc )      Please note: She did ask me several times "when can I have the surgery?'  I explained to her that it is a longer process and told her about the various people she would see before approval   I also explained that not everyone is guaranteed surgery and that there are a lot of factors involve        Patient seen by Medical Provider in past 6 months:  Yes  PCP  Requested to schedule appointment with Medical Provider: Yes  (wants to see surgeon)    Anthropometric Measurements  Start Weight (#) 289 8 lbs  Current Weight (#): 289 9 lbs  TBW % Change from start weight: 0%  Ideal Body Weight (#): 127 5 lbs  Goal Weight (#):  190 lbs    Weight Loss History  Previous weight loss attempts: Unable to clarify what types of programs or plans she has tried in the past      Food and Nutrition Related History    Food Recall  Breakfast: coffee, pop tart   Snack: spoonful of pb or fruit  Lunch: coffee  (usually skips)  Snack:not always but will have banana  Dinner: hamburger and pasta  Snack: fruit snacks      Beverages: water, coffee with flavored creamer  Volume of beverage intake: 8 oz    Weekends: Same  Cravings: sweets  Trouble area of day: evening  Frequency of Eating out: biweekly  (chinese, pizza etc )  Food restrictions:none  Cooking: self   Food Shopping: self    Physical Activity Intake  Activity:none  Frequency:rarely  Physical limitations/barriers to exercise: none    Estimated Needs  Energy    Bear Nicolas Energy Needs: BMR :  2043 1-2# loss weekly sedentary: 1552 - 1952             1-2# loss weekly lightly active:  Maintenance calories for sedentary activity level: 2452  Protein: 69 - 87 (1 2-1 5g/kg IBW)  Fluid: 68  (35mL/kg IBW)    Nutrition Diagnosis  Yes;     Overweight/obesity  related to Excess energy intake as evidenced by  BMI more than normative standard for age and sex (obesity-grade III 36+)       Nutrition Intervention    Nutrition Prescription  Calories:  1500   Protein: 69 - 87  Fluid: 76      Nutrition Education:    Calorie controlled menu  Lean protein food choices  Healthy snack options  Food journaling tips      Nutrition Counseling:  Strategies: meal planning, portion sizes, healthy snack choices, hydration, fiber intake, protein intake, exercise, food journal      Monitoring and Evaluation:  Evaluation criteria:  Energy Intake  Meet protein needs  Maintain adequate hydration  Monitor weekly weight  Meal planning/preparation  Food journal   Decreased portions at mealtimes and snacks  Physical activity     Barriers to learning:none  Readiness to change: Action:  (Changing behavior)  Comprehension: poor  Expected Compliance: poor

## 2021-03-02 VITALS — HEIGHT: 66 IN | BODY MASS INDEX: 46.57 KG/M2 | WEIGHT: 289.8 LBS

## 2021-03-09 ENCOUNTER — CONSULT (OUTPATIENT)
Dept: BARIATRICS | Facility: CLINIC | Age: 31
End: 2021-03-09
Payer: COMMERCIAL

## 2021-03-09 VITALS
WEIGHT: 288.5 LBS | SYSTOLIC BLOOD PRESSURE: 122 MMHG | HEART RATE: 70 BPM | BODY MASS INDEX: 46.36 KG/M2 | DIASTOLIC BLOOD PRESSURE: 80 MMHG | TEMPERATURE: 97.9 F | HEIGHT: 66 IN

## 2021-03-09 DIAGNOSIS — F33.1 DEPRESSION, MAJOR, RECURRENT, MODERATE (HCC): ICD-10-CM

## 2021-03-09 DIAGNOSIS — Z01.818 ENCOUNTER FOR OTHER PREPROCEDURAL EXAMINATION: Primary | ICD-10-CM

## 2021-03-09 DIAGNOSIS — E66.01 MORBID (SEVERE) OBESITY DUE TO EXCESS CALORIES (HCC): ICD-10-CM

## 2021-03-09 DIAGNOSIS — E66.01 OBESITY, CLASS III, BMI 40-49.9 (MORBID OBESITY) (HCC): ICD-10-CM

## 2021-03-09 PROCEDURE — 99204 OFFICE O/P NEW MOD 45 MIN: CPT | Performed by: SURGERY

## 2021-03-09 PROCEDURE — 1036F TOBACCO NON-USER: CPT | Performed by: SURGERY

## 2021-03-09 NOTE — PROGRESS NOTES
BARIATRIC INITIAL CONSULT - BARIATRIC SURGERY    Pat Thomas 27 y o  female MRN: [id number]  Unit/Bed#:  Encounter: [id number]      HPI:  Pat Thomas is a 27 y o  female who presents with a longstanding history of morbid obesity and inability to sustain a meaningful weight loss  She is a SAHM  She desires to pursue metabolic and bariatric surgery to improve her health and have more energy for her twin girls  She denies GERD  Ibuprofen for HAs and back pain  +Vaping  Denies DVT/PE  Here today to discuss bariatric options  Visit type: initial visit    Symptoms: inability to loss weight, fatigue and back pain    Associated Symptoms: none    Associated Conditions: none  Disease Complications: none  Weight Loss Interest: high    Exercise Frequency:infrequency  Types of Exercise: walking      Review of Systems   Constitutional: Positive for fatigue  Musculoskeletal: Positive for back pain  Neurological: Positive for headaches  All other systems reviewed and are negative        Historical Information   Past Medical History:   Diagnosis Date    Closed disp fx of styloid process of left radius with routine healing     Last Assessed 2017    Depression     Obesity, Class III, BMI 40-49 9 (morbid obesity) ([facility] [address] )     Psychiatric disorder     "mood swings"     Past Surgical History:   Procedure Laterality Date     SECTION      WISDOM TOOTH EXTRACTION      all 4     Social History   Social History     Substance and Sexual Activity   Alcohol Use Yes    Comment: rarely     Social History     Substance and Sexual Activity   Drug Use No     Social History     Tobacco Use   Smoking Status Former Smoker    Types: Cigarettes    Quit date:     Years since quittin 1   Smokeless Tobacco Never Used     Family History: morbid obesity (mother and brother sleeve gastrectomies); DVT (father)    Meds/Allergies   all medications and allergies reviewed  No Known Allergies    Objective       Current Vitals:   /80 (BP Location: Left arm, Patient Position: Sitting, Cuff Size: Large)   Pulse 70   Temp 97 9 °F (36 6 °C) (Temporal)   Ht 5' 5 5" (1 664 m)   Wt 131 kg (288 lb 8 oz)   BMI 47 28 kg/m²       Invasive Devices     None                 Physical Exam  Constitutional:       Appearance: Normal appearance  HENT:      Head: Atraumatic  Nose: No rhinorrhea  Eyes:      Extraocular Movements: Extraocular movements intact  Neck:      Musculoskeletal: Normal range of motion  Cardiovascular:      Rate and Rhythm: Normal rate  Pulmonary:      Effort: Pulmonary effort is normal  No respiratory distress  Abdominal:      General: Abdomen is flat  There is no distension  Palpations: Abdomen is soft  Tenderness: There is no abdominal tenderness  Skin:     General: Skin is warm and dry  Coloration: Skin is not pale  Neurological:      General: No focal deficit present  Mental Status: She is alert and oriented to person, place, and time  Psychiatric:         Mood and Affect: Mood normal          Behavior: Behavior normal          Lab Results: I have personally reviewed pertinent lab results  Imaging: I have personally reviewed pertinent reports  EKG, Pathology, and Other Studies: I have personally reviewed pertinent reports  Assessment/PLAN:    27 y o  yo female with a long standing h/o of obesity and inability to sustain any meaningful weight loss on her own despite several attempts  She is interested in the Laparoscopic sleeve gastrectomy  Patient has been counseled about the risk of developing gastroesophageal reflux disease (GERD), worsening of current GERD and/or silent reflux  Patient has also been counseled on the risk of developing Ugalde's esophagus (18%)  As a result the patient may require treatment with medications, further interventions and possibly additional surgery   Patient will require routine endoscopic surveillance to monitor for these possible complications  As a part of her pre op evaluation, she will be referred to a cardiologist and for a sleep evaluation and consult after successfully completing an evaluation with our pre-certification/, registered dietician and licensed clinical   She needs an EGD to evaluate the anatomy of her GI tract prior to the operation  I have spent over 45 minutes with her face to face in the office today discussing her options and details of the surgery  We have seen an animation of the surgery on the computer that illustrates how the operation is done and how the anatomy will be altered with the procedure  Over 50% of this was coordinating care  She was given the opportunity to ask questions and I have answered all of them  I have discussed and educated the patient with regards to the components of our multidisciplinary program and the importance of compliance and follow up in the post operative period  The patient was also instructed with regards to the importance of behavior modification, nutritional counseling, support meeting attendance and lifestyle changes that are important to ensure success  Although there is a great statistical chance of improvement or even resolution of most of her associated comorbidities, the results vary from patient to patient and they largely depend on her commitment and compliance  Weight loss goal will be determined at the time of her evaluation      Johana Wharton MD  3/9/2021  10:52 AM

## 2021-03-09 NOTE — LETTER
2021     Quin Bolaños MD  [address]  [address] [place] [address]    Patient: Verito Martinez   YOB: 1990   Date of Visit: 3/9/2021       Dear Dr Elizabeth Fairbanks:    Thank you for referring Verito Martinez to me for evaluation for metabolic and bariatric surgery  Below are my notes for this consultation  If you have questions, please do not hesitate to call me  I look forward to following your patient along with you  Sincerely,        Mary Scott MD        CC: No Recipients  Mary Scott MD  3/9/2021 10:55 AM  Sign when Signing Visit      [address] [place] 27 y o  female MRN: [id number]  Unit/Bed#:  Encounter: [id number]      HPI:  Verito Martinez is a 27 y o  female who presents with a longstanding history of morbid obesity and inability to sustain a meaningful weight loss  She is a SAHM  She desires to pursue metabolic and bariatric surgery to improve her health and have more energy for her twin girls  She denies GERD  Ibuprofen for HAs and back pain  +Vaping  Denies DVT/PE  Here today to discuss bariatric options  Visit type: initial visit    Symptoms: inability to loss weight, fatigue and back pain    Associated Symptoms: none    Associated Conditions: none  Disease Complications: none  Weight Loss Interest: high    Exercise Frequency:infrequency  Types of Exercise: walking      Review of Systems   Constitutional: Positive for fatigue  Musculoskeletal: Positive for back pain  Neurological: Positive for headaches  All other systems reviewed and are negative        Historical Information   Past Medical History:   Diagnosis Date    Closed disp fx of styloid process of left radius with routine healing     Last Assessed 2017    Depression     Obesity, Class III, BMI 40-49 9 (morbid obesity) ([facility] [address] )     Psychiatric disorder     "mood swings"     Past Surgical History:   Procedure Laterality Date     SECTION      WISDOM TOOTH EXTRACTION      all 4     Social History   Social History     Substance and Sexual Activity   Alcohol Use Yes    Comment: rarely     Social History     Substance and Sexual Activity   Drug Use No     Social History     Tobacco Use   Smoking Status Former Smoker    Types: Cigarettes    Quit date:     Years since quittin 1   Smokeless Tobacco Never Used     Family History: morbid obesity (mother and brother sleeve gastrectomies); DVT (father)    Meds/Allergies   all medications and allergies reviewed  No Known Allergies    Objective       Current Vitals:   /80 (BP Location: Left arm, Patient Position: Sitting, Cuff Size: Large)   Pulse 70   Temp 97 9 °F (36 6 °C) (Temporal)   Ht 5' 5 5" (1 664 m)   Wt 131 kg (288 lb 8 oz)   BMI 47 28 kg/m²       Invasive Devices     None                 Physical Exam  Constitutional:       Appearance: Normal appearance  HENT:      Head: Atraumatic  Nose: No rhinorrhea  Eyes:      Extraocular Movements: Extraocular movements intact  Neck:      Musculoskeletal: Normal range of motion  Cardiovascular:      Rate and Rhythm: Normal rate  Pulmonary:      Effort: Pulmonary effort is normal  No respiratory distress  Abdominal:      General: Abdomen is flat  There is no distension  Palpations: Abdomen is soft  Tenderness: There is no abdominal tenderness  Skin:     General: Skin is warm and dry  Coloration: Skin is not pale  Neurological:      General: No focal deficit present  Mental Status: She is alert and oriented to person, place, and time  Psychiatric:         Mood and Affect: Mood normal          Behavior: Behavior normal          Lab Results: I have personally reviewed pertinent lab results  Imaging: I have personally reviewed pertinent reports  EKG, Pathology, and Other Studies: I have personally reviewed pertinent reports          Assessment/PLAN:    27 y o  yo female with a long standing h/o of obesity and inability to sustain any meaningful weight loss on her own despite several attempts  She is interested in the Laparoscopic sleeve gastrectomy  Patient has been counseled about the risk of developing gastroesophageal reflux disease (GERD), worsening of current GERD and/or silent reflux  Patient has also been counseled on the risk of developing Ugalde's esophagus (18%)  As a result the patient may require treatment with medications, further interventions and possibly additional surgery  Patient will require routine endoscopic surveillance to monitor for these possible complications  As a part of her pre op evaluation, she will be referred to a cardiologist and for a sleep evaluation and consult after successfully completing an evaluation with our pre-certification/, registered dietician and licensed clinical   She needs an EGD to evaluate the anatomy of her GI tract prior to the operation  I have spent over 45 minutes with her face to face in the office today discussing her options and details of the surgery  We have seen an animation of the surgery on the computer that illustrates how the operation is done and how the anatomy will be altered with the procedure  Over 50% of this was coordinating care  She was given the opportunity to ask questions and I have answered all of them  I have discussed and educated the patient with regards to the components of our multidisciplinary program and the importance of compliance and follow up in the post operative period  The patient was also instructed with regards to the importance of behavior modification, nutritional counseling, support meeting attendance and lifestyle changes that are important to ensure success       Although there is a great statistical chance of improvement or even resolution of most of her associated comorbidities, the results vary from patient to patient and they largely depend on her commitment and compliance  Weight loss goal will be determined at the time of her evaluation      Bee Mendoza MD  3/9/2021  10:52 AM

## 2021-03-22 ENCOUNTER — OFFICE VISIT (OUTPATIENT)
Dept: BARIATRICS | Facility: CLINIC | Age: 31
End: 2021-03-22

## 2021-03-22 DIAGNOSIS — R63.5 ABNORMAL WEIGHT GAIN: ICD-10-CM

## 2021-03-22 PROCEDURE — RECHECK

## 2021-03-22 PROCEDURE — WMDI30

## 2021-03-22 NOTE — PROGRESS NOTES
Weight Management Medical Nutrition Assessment  Walter Gramajo was here today for medical meal planning  Today she weighs 289 8 lbs which is exactly the weight she was at her last appointment with me  She is interested in pursing surgery, and has met with Dr Emely Brown, and has her eval schedule for next  She wanted to keep her appointment with me to help keep her on track  She reports that she has started to walk, and is likiting her carb poritons  She has not started to food log yet, but plans to start this week  She has made some good changes:  No longer skipping meals, being more activie, drinking more water, and swithcing from reg yogurt to [place] non fat yogurt  Patient seen by Medical Provider in past 6 months:  Yes  PCP  Requested to schedule appointment with Medical Provider: Yes  (wants to see surgeon)     Anthropometric Measurements  Start Weight (#) 289 8 lbs  Current Weight (#): 289 8 lbs  TBW % Change from start weight: 0%  Ideal Body Weight (#): 127 5 lbs  Goal Weight (#):  190 lbs     Weight Loss History  Previous weight loss attempts: Unable to clarify what types of programs or plans she has tried in the past       Food and Nutrition Related History     Food Recall  Breakfast: coffee, oatmeal       Snack: spoonful of pb   Lunch: sandwich or salad   Snack:string cheese  Dinner: cheese burger and fries  Snack: trail mix        Beverages: water, coffee with flavored creamer  Volume of beverage intake: 8 oz     Weekends: Same  Cravings: sweets  Trouble area of day: evening  Frequency of Eating out: biweekly  (chinese, pizza etc )  Food restrictions:none  Cooking: self   Food Shopping: self     Physical Activity Intake  Activity:walking sometimes  Frequency:rarely  Physical limitations/barriers to exercise: none     Estimated Needs  Energy     Bear Nicolas Energy Needs:  BMR :  2043 1-2# loss weekly sedentary: 1552 - 1952             1-2# loss weekly lightly active:  Maintenance calories for sedentary activity level: 2452  Protein: 69 - 87 (1 2-1 5g/kg IBW)  Fluid: 68  (35mL/kg IBW)     Nutrition Diagnosis  Yes;     Overweight/obesity  related to Excess energy intake as evidenced by  BMI more than normative standard for age and sex (obesity-grade III 36+)     Nutrition Intervention     Nutrition Prescription  Calories:  1500   Protein: 71 - 87  Fluid: 68        Nutrition Education:    Calorie controlled menu  Lean protein food choices  Healthy snack options  Food journaling tips        Nutrition Counseling:  Strategies: meal planning, portion sizes, healthy snack choices, hydration, fiber intake, protein intake, exercise, food journal        Monitoring and Evaluation:  Evaluation criteria:  Energy Intake  Meet protein needs  Maintain adequate hydration  Monitor weekly weight  Meal planning/preparation  Food journal   Decreased portions at mealtimes and snacks  Physical activity      Barriers to learning:none  Readiness to change: Action:  (Changing behavior)  Comprehension: poor  Expected Compliance: poor

## 2021-03-26 NOTE — PATIENT INSTRUCTIONS
BARIATRIC SURGERY EDUCATION CHECKLIST    I have received education related to my bariatric surgery process and understand:    Patients may be required to complete a psychiatric evaluation and receive clearance for surgery from their psychiatrist     Patients who undergo weight loss surgery are at higher risk of increased mental health concerns and suicide attempts  Patients may be required to complete a full substance abuse evaluation and then complete all treatment recommendations prior to surgery  If diagnosis of abuse/dependence results, patient may be required to remain sober for one (1) year before having bariatric surgery  Patients on psychiatric medications should check with their provider to discuss psychiatric medications and the changes in absorption  Patient should discuss all time release medications with provider and take all medications as prescribed  The recommendation is that there is no use of  any tobacco products, Hookah or  vapes for the bariatric post-operation patient  Bariatric surgery patients should not consume alcohol as a post-operative patient as it may increase risk of numerous health conditions including but not limited to alcohol abuse and ulcers  There is a possibility of weight regain if patient does not follow all program guidelines and recommendations  Bariatric surgery patients should exercise thirty (30) to sixty (60) minutes per day to maintain post-surgical weight loss  Research indicates that bariatric patients are more successful when they see a therapist for up to two (2) years post-op  Patients will follow all medical and dietary recommendations provided  Patient will keep all scheduled appointments and follow up with their physician for a minimum of five (5) years  Patient will take all vitamins as recommended  Post-operative vitamins are life-long      Patient reviewed Bariatric Surgery Education Checklist and agrees they have received education on these issues

## 2021-03-26 NOTE — PROGRESS NOTES
Bariatric Nutrition Assessment Note - Initial Visit    Insurance: No Weight checks required    Type of surgery    Interested in Vertical sleeve gastrectomy  Surgery Date: TBD  Surgeon: Dr Za Mann (Consult 3/9/2021)    Nutrition Assessment   Lonn Guardian  27 y o   female   Height: 5'5 5"  Eval Weight:283#   BMI: 46 4  Wt with BMI of 25: 152 5#  Pre-Op Excess Wt: 130 5#  BMI to Qualify at 40 = 244#  Pt advised not to gain weight during preop process  Pt encouraged to lose weight via healthy eating and exercise  Pt may follow Liver Shrinking diet 2 weeks prior to DOS depending on BMI at time  This diet will promote weight loss        Blood pressure 110/80, pulse 80, temperature (!) 97 4 °F (36 3 °C), temperature source Temporal, height 5' 5 5" (1 664 m), weight 128 kg (283 lb)      Weight History  Reason for WLS: Diets don't work   Onset of Obesity: Childhood  Family history of obesity: Yes ( mom and older brother - did well)  Wt Loss Attempts: Commercial Programs ([employer]Le[employer]ir[employer], Traceeella Barantony, etc )  Meal Replacements (Medifast, Slim Fast, etc )  Self Created Diets (Portion Control, Healthy Food Choices, etc )  Maximum Wt Lost: 15#    Review of History and Medications   OTC One a day vitamin  HS Sertraline  Past Medical History:   Diagnosis Date    Closed disp fx of styloid process of left radius with routine healing     Last Assessed 2017    Depression     Obesity, Class III, BMI 40-49 9 (morbid obesity) ([address] )     Psychiatric disorder     "mood swings"     Past Surgical History:   Procedure Laterality Date     SECTION      WISDOM TOOTH EXTRACTION      all 4     Social History     Socioeconomic History    Marital status: Single     Spouse name: None    Number of children: None    Years of education: None    Highest education level: None   Occupational History    None   Social Needs    Financial resource strain: None    Food insecurity     Worry: None     Inability: None    Transportation needs     Medical: None     Non-medical: None   Tobacco Use    Smoking status: Former Smoker     Types: Cigarettes     Quit date:      Years since quittin 2    Smokeless tobacco: Never Used   Substance and Sexual Activity    Alcohol use: Yes     Comment: rarely    Drug use: No    Sexual activity: Not Currently   Lifestyle    Physical activity     Days per week: None     Minutes per session: None    Stress: None   Relationships    Social connections     Talks on phone: None     Gets together: None     Attends Rastafari service: None     Active member of club or organization: None     Attends meetings of clubs or organizations: None     Relationship status: None    Intimate partner violence     Fear of current or ex partner: None     Emotionally abused: None     Physically abused: None     Forced sexual activity: None   Other Topics Concern    None   Social History Narrative    None       Current Outpatient Medications:     multivitamin (THERAGRAN) TABS, Take 1 tablet by mouth daily, Disp: , Rfl:     hydrOXYzine HCL (ATARAX) 25 mg tablet, Take 1 tablet (25 mg total) by mouth daily at bedtime (Patient not taking: Reported on 3/9/2021), Disp: 30 tablet, Rfl: 0    sertraline (ZOLOFT) 100 mg tablet, Take 1 tablet (100 mg total) by mouth daily at bedtime (Patient not taking: Reported on 3/29/2021), Disp: 90 tablet, Rfl: 3  Food Intake and Lifestyle Assessment   Food Intake Assessment completed via usual diet recall  Breakfast: Coffee, Bevita breakfast  Lunch: Smoothie (frozen fruit, banana, milk, ice PB)  Dinner:1/2c Rice 3pcs meat vegetable ( corn, cucumbers, broccoli, etc)  Snack: Wheat Thins, Carrots/PB or Pork Rinds   Beverage intake: water, whole milk and coffee, Smoothie -   Protein supplement: no but make smoothies  Estimated protein intake per day: 50-60  Estimated fluid intake per day: 48 oz   Water  Meals eaten away frohome: Occ  Typical meal pattern: 2-3 meals per day and 1-2 snacks per day  Eating Behaviors: Emotional eating or mindless eating (In past when depressed), Craves sweet foods and Craves salty foods  Food allergies or intolerances: No Known Allergies or intolerances  Cultural or Sikhism considerations: None    Physical Assessment  Physical Activity - Housework  Types of exercise: Gym in garage (Bench- weights, trampoline, exercise ball)- not often  Also tracks steps with step tracker  Current physical limitations: None    Psychosocial Assessment   Support systems: parent(s) sibling(s) significant other (not supportive) - Has twin girls 10 yo  Socioeconomic factors: not assessed  Not working - used to work in [employer]    Nutrition Diagnosis  Diagnosis: Overweight / Obesity (NC-3 3)  Related to: Physical inactivity and Excessive energy intake  As Evidenced by: BMI >25     Nutrition Prescription: Recommend the following diet  Low fat, Low sugar, High protein and Regular    Interventions and Teaching   Discussed pre-op and post-op nutrition guidelines  Patient educated and handouts provided    Surgical changes to stomach / GI  Capacity of post-surgery stomach  Diet progression  Adequate hydration  Sugar and fat restriction to decrease "dumping syndrome"  Fat restriction to decrease steatorrhea  Expected weight loss  Weight loss plateaus/ possibility of weight regain  Exercise  Suggestions for pre-op diet  Nutrition considerations after surgery  Protein supplements  Meal planning and preparation  Appropriate carbohydrate, protein, and fat intake, and food/fluid choices to maximize safe weight loss, nutrient intake, and tolerance   Dietary and lifestyle changes  Possible problems with poor eating habits  Intuitive eating  Techniques for self monitoring and keeping daily food journal  Potential for food intolerance after surgery, and ways to deal with them including: lactose intolerance, nausea, reflux, vomiting, diarrhea, food intolerance, appetite changes, gas  Vitamin / Mineral supplementation of Multivitamin with minerals, Calcium, Vitamin B12, Iron and Vitamin D    Education provided to: patient    Barriers to learning: No barriers identified  Readiness to change: preparation    Prior research on procedure: discussed with provider and friends or family    Comprehension: verbalizes understanding     Expected Compliance: good    Recommendations  Pt is an appropriate candidate for surgery   Yes    Evaluation / Monitoring  Dietitian to Monitor: Eating pattern as discussed Tolerance of nutrition prescription Body weight Lab values Physical activity Bowel pattern    Goals  Food journal, Exercise 30 minutes 5 times per week, Complete lession plans 1-6 and Eat 3 meals per day  Follow pre-surgery guidelines  > Increase water  > Focus on protein each meal and snack  > Increase activity/exercise  F/U next month  Time Spent:   1 Hour

## 2021-03-26 NOTE — PROGRESS NOTES
Bariatric Behavioral Health Evaluation    Presenting Problem: 27year old female ( 1990) here for behavioral health evaluation  Patient had initial consult with Dr Javi Tidwell 3/9/2021  Patient is wanting to improve her health and "get my life on track "    Is the patient seeking Bariatric Surgery Eval? Yes  If yes how long have you researched this surgery option  Patient reports that she has been looking into bariatric surgery for about a year when she saw her brother and her mom go through the process at that time  Realizes Post- Op Requirements? Yes, but would benefit from more education  Pre-morbid level of function and history of present illness: Patient reports struggling with her weight since she was a teenager when she went through puberty  Psychiatric/Psychological Treatment Diagnosis: Diagnosis of Depression, monitored with medication prescribed by her PCP  Patient educated on the benefits of outpatient therapy to the bariatric patient while going through the process of surgery, resource list provided  Outpatient Counselor No     Psychiatrist No     Have you had Inpatient Treatment? No    Family Constellation (include relationship with each and Psych/Med HX)    Mother  obesity, Father  obesity, Siblings  obesity and Other  boyfriend obesity    Domestic Violence No    Abuse History:  Patient denies this  Additional comments/stressors related to family/relationships/peer support: Patient identifies her mom and her dad as her support  Patient identifies not being able to focus on tasks as a current stressors      Physical/Psychological Assessment:     Appearance: appropriate  Sociability: reclusive  Affect: flat  Mood: calm  Thought Process: delayed  Speech: scant  Content: no impairment  Orientation: person  Yes , place  Yes , time  Yes , normal attention span  No, normal memory  Yes   and normal judgement  Yes   Insight: emotional  fair    Risk Assessment:     none    Recommendations: Decision for surgery deferred  Patient will require further evaluation  SW will bring patient up to the team at [facility]  Risk of Harm to Self or Others: Patient denies SI or HI     Observation:     Interviews: This interview only  Access to weapons no     Based on the previous information, the client presents the following risk of harm to self or others: low     Note: Diagnosis of Depression, monitored with medication prescribed by her PCP  Patient educated on the benefits of outpatient therapy to the bariatric patient while going through the process of surgery, resource list provided  Patient not currently pregnant, educated patient on the importance of birth control prior to surgery due to the increased risk of pregnancy in the year following surgery  Quit smoking cigarettes about 11 year ago, currently vaping  Alcohol use about 4x per year  Patient educated on the impact of nicotine and alcohol on the post bariatric patient  Patient agrees to abstain from all substances prior to as well as after surgery  Decision for surgery deferred  Patient will require further evaluation  SW will bring patient up to the team at [facility]  Patient will follow up with SW next month  Justen Puente  **update 5/26/2021: Diagnosis of a learning disability in patient's chart, however patient has been making excellent progress toward surgery and this should not hold her up in proceeding with surgery  Patient able to proceed   HC LCSW

## 2021-03-29 ENCOUNTER — CLINICAL SUPPORT (OUTPATIENT)
Dept: BARIATRICS | Facility: CLINIC | Age: 31
End: 2021-03-29

## 2021-03-29 VITALS
DIASTOLIC BLOOD PRESSURE: 80 MMHG | TEMPERATURE: 97.4 F | SYSTOLIC BLOOD PRESSURE: 110 MMHG | HEART RATE: 80 BPM | WEIGHT: 283 LBS | HEIGHT: 66 IN | BODY MASS INDEX: 45.48 KG/M2

## 2021-03-29 DIAGNOSIS — Z98.84 BARIATRIC SURGERY STATUS: Primary | ICD-10-CM

## 2021-03-29 PROCEDURE — 3008F BODY MASS INDEX DOCD: CPT | Performed by: SURGERY

## 2021-03-29 PROCEDURE — RECHECK

## 2021-04-27 NOTE — PROGRESS NOTES
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

## 2021-04-28 ENCOUNTER — OFFICE VISIT (OUTPATIENT)
Dept: BARIATRICS | Facility: CLINIC | Age: 31
End: 2021-04-28

## 2021-04-28 VITALS — HEIGHT: 66 IN | WEIGHT: 278 LBS | BODY MASS INDEX: 44.68 KG/M2

## 2021-04-28 DIAGNOSIS — Z71.89 ENCOUNTER FOR PRE-BARIATRIC SURGERY COUNSELING AND EDUCATION: Primary | ICD-10-CM

## 2021-04-28 PROCEDURE — 3008F BODY MASS INDEX DOCD: CPT | Performed by: SURGERY

## 2021-04-28 PROCEDURE — RECHECK

## 2021-05-04 ENCOUNTER — OFFICE VISIT (OUTPATIENT)
Dept: FAMILY MEDICINE CLINIC | Facility: CLINIC | Age: 31
End: 2021-05-04
Payer: COMMERCIAL

## 2021-05-04 VITALS
RESPIRATION RATE: 16 BRPM | WEIGHT: 283 LBS | HEIGHT: 66 IN | BODY MASS INDEX: 45.48 KG/M2 | DIASTOLIC BLOOD PRESSURE: 72 MMHG | OXYGEN SATURATION: 98 % | TEMPERATURE: 96.1 F | SYSTOLIC BLOOD PRESSURE: 108 MMHG | HEART RATE: 83 BPM

## 2021-05-04 DIAGNOSIS — E66.01 CLASS 3 SEVERE OBESITY DUE TO EXCESS CALORIES WITHOUT SERIOUS COMORBIDITY WITH BODY MASS INDEX (BMI) OF 45.0 TO 49.9 IN ADULT (HCC): Primary | ICD-10-CM

## 2021-05-04 DIAGNOSIS — F33.1 DEPRESSION, MAJOR, RECURRENT, MODERATE (HCC): ICD-10-CM

## 2021-05-04 DIAGNOSIS — F51.01 PRIMARY INSOMNIA: ICD-10-CM

## 2021-05-04 PROCEDURE — 1036F TOBACCO NON-USER: CPT | Performed by: FAMILY MEDICINE

## 2021-05-04 PROCEDURE — 99214 OFFICE O/P EST MOD 30 MIN: CPT | Performed by: FAMILY MEDICINE

## 2021-05-04 RX ORDER — SERTRALINE HYDROCHLORIDE 100 MG/1
100 TABLET, FILM COATED ORAL
Qty: 90 TABLET | Refills: 3 | Status: SHIPPED | OUTPATIENT
Start: 2021-05-04 | End: 2021-08-24 | Stop reason: SDUPTHER

## 2021-05-14 ENCOUNTER — APPOINTMENT (OUTPATIENT)
Dept: LAB | Facility: HOSPITAL | Age: 31
End: 2021-05-14
Payer: COMMERCIAL

## 2021-05-14 LAB
ALBUMIN SERPL BCP-MCNC: 4.1 G/DL (ref 3.4–4.8)
ALP SERPL-CCNC: 73.8 U/L (ref 35–140)
ALT SERPL W P-5'-P-CCNC: 4 U/L (ref 5–54)
ANION GAP SERPL CALCULATED.3IONS-SCNC: 6 MMOL/L (ref 4–13)
AST SERPL W P-5'-P-CCNC: 15 U/L (ref 15–41)
BILIRUB SERPL-MCNC: 0.34 MG/DL (ref 0.3–1.2)
BUN SERPL-MCNC: 16 MG/DL (ref 6–20)
CALCIUM SERPL-MCNC: 10.1 MG/DL (ref 8.4–10.2)
CHLORIDE SERPL-SCNC: 106 MMOL/L (ref 96–108)
CO2 SERPL-SCNC: 26 MMOL/L (ref 22–33)
CREAT SERPL-MCNC: 0.75 MG/DL (ref 0.4–1.1)
GFR SERPL CREATININE-BSD FRML MDRD: 107 ML/MIN/1.73SQ M
GLUCOSE P FAST SERPL-MCNC: 94 MG/DL (ref 70–105)
POTASSIUM SERPL-SCNC: 4.2 MMOL/L (ref 3.5–5)
PROT SERPL-MCNC: 8.3 G/DL (ref 6.4–8.3)
SODIUM SERPL-SCNC: 138 MMOL/L (ref 133–145)

## 2021-05-14 PROCEDURE — 80053 COMPREHEN METABOLIC PANEL: CPT | Performed by: FAMILY MEDICINE

## 2021-05-14 PROCEDURE — 36415 COLL VENOUS BLD VENIPUNCTURE: CPT | Performed by: FAMILY MEDICINE

## 2021-05-19 ENCOUNTER — TELEPHONE (OUTPATIENT)
Dept: BARIATRICS | Facility: CLINIC | Age: 31
End: 2021-05-19

## 2021-05-19 NOTE — TELEPHONE ENCOUNTER
Tried to reach out to pt to reschedule her appointment that she missed today for a weight check twice and her mailbox is not set up  I will try again later today

## 2021-05-26 ENCOUNTER — OFFICE VISIT (OUTPATIENT)
Dept: BARIATRICS | Facility: CLINIC | Age: 31
End: 2021-05-26

## 2021-05-26 VITALS — BODY MASS INDEX: 44.58 KG/M2 | HEIGHT: 66 IN | WEIGHT: 277.4 LBS | TEMPERATURE: 97.3 F

## 2021-05-26 DIAGNOSIS — Z01.818 PREOP TESTING: Primary | ICD-10-CM

## 2021-05-26 DIAGNOSIS — E66.01 MORBID (SEVERE) OBESITY DUE TO EXCESS CALORIES (HCC): ICD-10-CM

## 2021-05-26 PROCEDURE — RECHECK

## 2021-05-26 PROCEDURE — 3008F BODY MASS INDEX DOCD: CPT | Performed by: FAMILY MEDICINE

## 2021-05-26 NOTE — PROGRESS NOTES
Bariatric Nutrition Note -     Insurance: No Weight checks required    Type of surgery    Interested in Vertical sleeve gastrectomy  Surgery Date: TBD  Surgeon: Dr Yamilka Pelayo (Consult 3/9/2021)    Nutrition Assessment   Ilir Costa  27 y o   female   Height: 5'5 5"  Weight: 277 4  Eval Weight:283#   BMI: 46 4  Wt with BMI of 25: 152 5#  Pre-Op Excess Wt: 130 5#  BMI to Qualify at 40 = 244#  Pt advised not to gain weight during preop process  Pt encouraged to lose weight via healthy eating and exercise  Pt may follow Liver Shrinking diet 2 weeks prior to DOS depending on BMI at time  This diet will promote weight loss        There were no vitals taken for this visit      Review of History and Medications   OTC One a day vitamin  HS Sertraline  Past Medical History:   Diagnosis Date    Closed disp fx of styloid process of left radius with routine healing     Last Assessed 2017    Depression     Obesity, Class III, BMI 40-49 9 (morbid obesity) ([facility] [address] )     Psychiatric disorder     "mood swings"     Past Surgical History:   Procedure Laterality Date     SECTION      WISDOM TOOTH EXTRACTION      all 4     Social History     Socioeconomic History    Marital status: Single     Spouse name: Not on file    Number of children: Not on file    Years of education: Not on file    Highest education level: Not on file   Occupational History    Not on file   Social Needs    Financial resource strain: Not on file    Food insecurity     Worry: Not on file     Inability: Not on file    Transportation needs     Medical: Not on file     Non-medical: Not on file   Tobacco Use    Smoking status: Former Smoker     Types: Cigarettes     Quit date:      Years since quittin 4    Smokeless tobacco: Never Used   Substance and Sexual Activity    Alcohol use: Yes     Frequency: Monthly or less     Drinks per session: 1 or 2     Binge frequency: Never     Comment: rarely    Drug use: No    Sexual activity: Not Currently   Lifestyle    Physical activity     Days per week: Not on file     Minutes per session: Not on file    Stress: Not on file   Relationships    Social connections     Talks on phone: Not on file     Gets together: Not on file     Attends Rastafarian service: Not on file     Active member of club or organization: Not on file     Attends meetings of clubs or organizations: Not on file     Relationship status: Not on file    Intimate partner violence     Fear of current or ex partner: Not on file     Emotionally abused: Not on file     Physically abused: Not on file     Forced sexual activity: Not on file   Other Topics Concern    Not on file   Social History Narrative    Not on file       Current Outpatient Medications:     multivitamin (THERAGRAN) TABS, Take 1 tablet by mouth daily, Disp: , Rfl:     sertraline (ZOLOFT) 100 mg tablet, Take 1 tablet (100 mg total) by mouth daily at bedtime, Disp: 90 tablet, Rfl: 3  Food Intake and Lifestyle Assessment   Food Intake Assessment completed via usual diet recall  Breakfast: Coffee, Bevita breakfast  Lunch: Smoothie (frozen fruit, banana, milk, ice PB)  Dinner:1/2c Rice 3pcs meat vegetable ( corn, cucumbers, broccoli, etc)  Snack: Wheat Thins, Carrots/PB or Pork Rinds   Beverage intake: water, whole milk and coffee, Smoothie -   Protein supplement: no but make smoothies  Estimated protein intake per day: 50-60  Estimated fluid intake per day: 48 oz  Water  Meals eaten away frohome: Occ  Typical meal pattern: 2-3 meals per day and 1-2 snacks per day  Eating Behaviors: Emotional eating or mindless eating (In past when depressed), Craves sweet foods and Craves salty foods  Food allergies or intolerances: No Known Allergies or intolerances  Cultural or Rastafarian considerations: None    Physical Assessment  Physical Activity - Housework and now started working  Types of exercise: Gym in garage (Bench- weights, trampoline, exercise ball)- not often   Also tracks steps with step tracker  Current physical limitations: None    Psychosocial Assessment   Support systems: parent(s) sibling(s) significant other (not supportive) - Has twin girls 10 yo  Socioeconomic factors: not assessed  Not working - used to work in [employer]  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     Nutrition Diagnosis  Diagnosis: Overweight / Obesity (NC-3 3)  Related to: Physical inactivity and Excessive energy intake  As Evidenced by: BMI >25     Nutrition Prescription: Recommend the following diet  Low fat, Low sugar, High protein and Regular    Interventions and Teaching   Discussed pre-op and post-op nutrition guidelines  Patient educated and handouts provided    Surgical changes to stomach / GI  Capacity of post-surgery stomach  Diet progression  Adequate hydration  Sugar and fat restriction to decrease "dumping syndrome"  Fat restriction to decrease steatorrhea  Expected weight loss  Weight loss plateaus/ possibility of weight regain  Exercise  Suggestions for pre-op diet  Nutrition considerations after surgery  Protein supplements  Meal planning and preparation  Appropriate carbohydrate, protein, and fat intake, and food/fluid choices to maximize safe weight loss, nutrient intake, and tolerance   Dietary and lifestyle changes  Possible problems with poor eating habits  Intuitive eating  Techniques for self monitoring and keeping daily food journal  Potential for food intolerance after surgery, and ways to deal with them including: lactose intolerance, nausea, reflux, vomiting, diarrhea, food intolerance, appetite changes, gas  Vitamin / Mineral supplementation of Multivitamin with minerals, Calcium, Vitamin B12, Iron and Vitamin D    Education provided to: patient    Barriers to learning: No barriers identified  Readiness to change: preparation    Prior research on procedure: discussed with provider and friends or family    Comprehension: verbalizes understanding     Expected Compliance: good      Evaluation / Monitoring  Dietitian to Monitor: Eating pattern as discussed Tolerance of nutrition prescription Body weight Lab values Physical activity Bowel pattern    Visit summary  Pt doing well with diet changes and her activity  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  Focusing on protein for her meals and snacks and reducing starch portions  Improved water intake - up to 8 bottles and reduced intake of soda  Inquired whether Hint water ok to drink  Still struggles with getting in her breakfast  Advised to start protein drink and provided samples  Pt receptive to visit and education  Next visit reinforce pre-surgery guidelines and discuss vitamins if workflow completed  Recall  B- coffee may have  Bevitas  Lunch provided at work  6" sub  Dinner: chicken, baked Potato, vegetable not every day, less rice  Snack: Pork Rinds (6-7) String Cheese w/nuts or tsp of PB  Water: 8 bottles (freezes) - much less soda (diet)    Workflow: Scheduled cardiology and completed podcast  Needs EGD and labs      Goals  Food journal, Exercise 30 minutes 5 times per week, Complete lession plans 1-6 and Eat 3 meals per day  Follow pre-surgery guidelines  >Maintain hydration  > Focus on protein each meal and snack  > Continue Increase activity/exercise  F/U next month  Time Spent:   30 minutes

## 2021-06-28 ENCOUNTER — CONSULT (OUTPATIENT)
Dept: CARDIOLOGY CLINIC | Facility: CLINIC | Age: 31
End: 2021-06-28
Payer: COMMERCIAL

## 2021-06-28 VITALS
HEIGHT: 65 IN | HEART RATE: 98 BPM | OXYGEN SATURATION: 99 % | BODY MASS INDEX: 47.25 KG/M2 | DIASTOLIC BLOOD PRESSURE: 80 MMHG | WEIGHT: 283.6 LBS | SYSTOLIC BLOOD PRESSURE: 112 MMHG

## 2021-06-28 DIAGNOSIS — Z98.84 BARIATRIC SURGERY STATUS: ICD-10-CM

## 2021-06-28 DIAGNOSIS — Z01.810 PREOP CARDIOVASCULAR EXAM: Primary | ICD-10-CM

## 2021-06-28 PROCEDURE — 99215 OFFICE O/P EST HI 40 MIN: CPT | Performed by: INTERNAL MEDICINE

## 2021-06-28 PROCEDURE — 93000 ELECTROCARDIOGRAM COMPLETE: CPT | Performed by: INTERNAL MEDICINE

## 2021-06-28 PROCEDURE — 1036F TOBACCO NON-USER: CPT | Performed by: INTERNAL MEDICINE

## 2021-06-28 NOTE — PROGRESS NOTES
CARDIOLOGY OFFICE VISIT  [facility]  [address], [facility] [place]  Tel: [phone]      NAME: Kevin Vogel  AGE: 27 y o  SEX: female  : 1990   MRN: [id number]      Chief Complaint:  Chief Complaint   Patient presents with    CArdiac Risk Assessment     Bariatric Surgery clearance         History of Present Illness:   Ref by Dr Torres Mccarty for cardiac risk assessment prior to bariatric surgery  Pt states she only gets short of breath when she exerts more than usual  Pt denies chest pain, palpitations, lightheadedness, syncope, swelling feet, orthopnea, PND, claudication      Patient denies history of CAD, CHF, TIA, CVA, DM, CKD    Past Medical History:  Past Medical History:   Diagnosis Date    Closed disp fx of styloid process of left radius with routine healing     Last Assessed 2017    Depression     Obesity, Class III, BMI 40-49 9 (morbid obesity) ([facility] [address] )     Psychiatric disorder     "mood swings"         Past Surgical History:  Past Surgical History:   Procedure Laterality Date     SECTION      WISDOM TOOTH EXTRACTION      all 4         Family History:  Family History   Problem Relation Age of Onset    Hypertension Mother     No Known Problems Father     Obesity Daughter          Social History:  Social History     Socioeconomic History    Marital status: Single     Spouse name: None    Number of children: None    Years of education: None    Highest education level: None   Occupational History    None   Tobacco Use    Smoking status: Former Smoker     Types: Cigarettes     Quit date:      Years since quittin 4    Smokeless tobacco: Never Used   Vaping Use    Vaping Use: Every day    Substances: Nicotine, Flavoring   Substance and Sexual Activity    Alcohol use: Yes     Comment: rarely    Drug use: No    Sexual activity: Not Currently   Other Topics Concern    None   Social History Narrative    None     Social Determinants of Health     Financial Resource Strain:     Difficulty of Paying Living Expenses:    Food Insecurity:     Worried About Running Out of Food in the Last Year:     [address] in the Last Year:    Transportation Needs:     Lack of Transportation (Medical):  Lack of Transportation (Non-Medical):    Physical Activity:     Days of Exercise per Week:     Minutes of Exercise per Session:    Stress:     Feeling of Stress :    Social Connections:     Frequency of Communication with Friends and Family:     Frequency of Social Gatherings with Friends and Family:     Attends Tenriism Services:     Active Member of Clubs or Organizations:     Attends Club or Organization Meetings:     Marital Status:    Intimate Partner Violence:     Fear of Current or Ex-Partner:     Emotionally Abused:     Physically Abused:     Sexually Abused:           Active Problems:  Patient Active Problem List   Diagnosis    Depression, major, recurrent, moderate ([address] )    Family history of ischemic heart disease (IHD)    Obesity due to excess calories without serious comorbidity    Primary insomnia         The following portions of the patient's history were reviewed and updated as appropriate: past medical history, past surgical history, past family history,  past social history, current medications, allergies and problem list       Review of Systems:  Constitutional: Denies fever, chills  Eyes: Denies eye redness, eye discharge  ENT: Denies hearing loss, tinnitus, sneezing, nasal discharge, sore throat   Respiratory: Denies cough, expectoration  Cardiovascular: Denies chest pain, palpitations, orthopnea  Gastrointestinal: Denies abdominal pain, nausea, vomiting, hematemesis, diarrhea, bloody stools  Genito-Urinary: Denies dysuria, incontinence  Musculoskeletal: Denies back pain, joint pain, muscle pain  Neurologic: Denies lightheadedness, syncope, headache, seizures  Endocrine: Denies polydipsia, temperature intolerance  Allergy and Immunology: Denies hives, insect bite sensitivity  Hematological and Lymphatic: Denies bleeding problems, swollen glands   Psychological: Denies depression, suicidal ideation, anxiety, panic  Dermatological: Denies pruritus, rash, skin lesion changes      Vitals:  Vitals:    06/28/21 0921   BP: 112/80   Pulse: 98   SpO2: 99%       Body mass index is 47 19 kg/m²  Weight (last 2 days)     Date/Time   Weight    06/28/21 0921   129 (283 6)                Physical Examination:  General:  Morbidly obese  Patient is not in acute distress  Awake, alert, oriented in time, place and person  Responding to commands  Head: Normocephalic  Atraumatic  Eyes: Both pupils normal sized, round and reactive to light  Nonicteric  ENT: Normal external ear canals  Neck: Supple  JVP not raised  Trachea central  No thyromegaly  Lungs: Bilateral bronchovascular breath sounds with no crackles or rhonchi  Chest wall: No tenderness  Cardiovascular: RRR  S1 and S2 normal  No murmur, rub or gallop  Gastrointestinal: Abdomen soft, nontender  No guarding or rigidity  Liver and spleen not palpable  Neurologic: Patient is awake, alert, oriented in time, place and person  Responding to command  Moving all extremities  Integumentary:  No skin rash  Lymphatic: No cervical lymphadenopathy  Back: Symmetric   No CVA tenderness  Extremities: No clubbing, cyanosis or edema      Laboratory Results:  CBC with diff:   Lab Results   Component Value Date    WBC 12 19 (H) 07/04/2020    RBC 4 62 07/04/2020    HGB 12 4 07/04/2020    HCT 40 2 07/04/2020    MCV 87 07/04/2020    MCH 26 8 07/04/2020    RDW 14 0 07/04/2020     07/04/2020       CMP:  Lab Results   Component Value Date    CREATININE 0 75 05/14/2021    BUN 16 05/14/2021    K 4 2 05/14/2021     05/14/2021    CO2 26 05/14/2021    ALKPHOS 73 8 05/14/2021    ALT 4 (L) 05/14/2021    AST 15 05/14/2021       Lipid Profile: No results found for: CHOL  Lab Results   Component Value Date    HDL 51 11/13/2020     Lab Results   Component Value Date    LDLCALC 100 11/13/2020     Lab Results   Component Value Date    TRIG 91 8 11/13/2020       EKG:   Reviewed by me   06/28/2021  Sinus rhythm      Medications:    Current Outpatient Medications:     multivitamin (THERAGRAN) TABS, Take 1 tablet by mouth daily, Disp: , Rfl:     sertraline (ZOLOFT) 100 mg tablet, Take 1 tablet (100 mg total) by mouth daily at bedtime, Disp: 90 tablet, Rfl: 3      Allergies:  No Known Allergies      Assessment and Plan:  1  Preop cardiovascular exam  2  Bariatric surgery status    Patient has no active cardiac conditions (such as unstable cardiac syndrome, decompensated heart failure, significant arrhythmias, severe valvular disease) and no clinical risk factors (such as CAD, compensated or prior heart failure, diabetes mellitus, renal insufficiency, CVA)  Patient can go up and down one flight of steps which is 4 METS  Patient is a low cardiac risk patient going in for an intermediate risk surgery  No further cardiac workup is needed at this time  No cardiac contraindications for surgery  Recommend aggressive risk factor modification and therapeutic lifestyle changes  Low-calorie, low-fat, low-cholesterol diet with regular exercise and to optimize weight  I will defer the ordering and monitoring of necessity lab studies to you, but I am available and happy to review and manage any of the data at your request in the future  Discussed concepts of atherosclerosis, including signs and symptoms of cardiac disease  Previous studies were reviewed  Safety measures were reviewed  Questions were entertained and answered  Patient was advised to report any problems requiring medical attention  Follow-up with PCP and appropriate specialist and lab work as discussed  Return for follow up visit as scheduled or earlier, if needed    Thank you for allowing me to participate in the care and evaluation of your patient  Should you have any questions, please feel free to contact me        Aramis Soriano MD  2/76/1647,4:15 AM

## 2021-06-30 ENCOUNTER — OFFICE VISIT (OUTPATIENT)
Dept: BARIATRICS | Facility: CLINIC | Age: 31
End: 2021-06-30

## 2021-06-30 VITALS — HEIGHT: 66 IN | BODY MASS INDEX: 45.64 KG/M2 | WEIGHT: 284 LBS

## 2021-06-30 DIAGNOSIS — Z71.89 ENCOUNTER FOR PRE-BARIATRIC SURGERY COUNSELING AND EDUCATION: Primary | ICD-10-CM

## 2021-06-30 PROCEDURE — 3008F BODY MASS INDEX DOCD: CPT | Performed by: INTERNAL MEDICINE

## 2021-06-30 PROCEDURE — RECHECK

## 2021-06-30 NOTE — PROGRESS NOTES
Pre op  Saw cardiology 6/28  Patient will need to schedule EGD- will do that today  Patient quit her job- was not getting along with her boss and wanted to be able to spend more time with her kids  Patient reports that she has been vaping about every other day  Patient drinking about 16 oz of coffee daily and 48-72 oz of water daily  Patient reports that she has been getting activity doing household chores  Has been having 3 meals per day  Goals discussed: 1  Labs 2  EGD  3   Talk to PCP about smoking cessation HC LCSW

## 2021-07-10 ENCOUNTER — APPOINTMENT (EMERGENCY)
Dept: CT IMAGING | Facility: HOSPITAL | Age: 31
End: 2021-07-10
Payer: COMMERCIAL

## 2021-07-10 ENCOUNTER — HOSPITAL ENCOUNTER (EMERGENCY)
Facility: HOSPITAL | Age: 31
Discharge: HOME | End: 2021-07-11
Attending: EMERGENCY MEDICINE | Admitting: EMERGENCY MEDICINE
Payer: COMMERCIAL

## 2021-07-10 DIAGNOSIS — S09.90XA CLOSED HEAD INJURY, INITIAL ENCOUNTER: Primary | ICD-10-CM

## 2021-07-10 DIAGNOSIS — S01.01XA LACERATION OF SCALP, INITIAL ENCOUNTER: ICD-10-CM

## 2021-07-10 PROCEDURE — 70450 CT HEAD/BRAIN W/O DYE: CPT

## 2021-07-10 PROCEDURE — 90715 TDAP VACCINE 7 YRS/> IM: CPT | Performed by: EMERGENCY MEDICINE

## 2021-07-10 PROCEDURE — 99284 EMERGENCY DEPT VISIT MOD MDM: CPT

## 2021-07-10 PROCEDURE — 99285 EMERGENCY DEPT VISIT HI MDM: CPT | Performed by: EMERGENCY MEDICINE

## 2021-07-10 PROCEDURE — 90471 IMMUNIZATION ADMIN: CPT

## 2021-07-10 PROCEDURE — 93005 ELECTROCARDIOGRAM TRACING: CPT

## 2021-07-10 PROCEDURE — G1004 CDSM NDSC: HCPCS

## 2021-07-10 PROCEDURE — 72125 CT NECK SPINE W/O DYE: CPT

## 2021-07-10 PROCEDURE — 12001 RPR S/N/AX/GEN/TRNK 2.5CM/<: CPT | Performed by: EMERGENCY MEDICINE

## 2021-07-10 RX ORDER — IBUPROFEN 600 MG/1
600 TABLET ORAL ONCE
Status: COMPLETED | OUTPATIENT
Start: 2021-07-10 | End: 2021-07-10

## 2021-07-10 RX ORDER — LIDOCAINE HYDROCHLORIDE 20 MG/ML
1 JELLY TOPICAL ONCE
Status: COMPLETED | OUTPATIENT
Start: 2021-07-10 | End: 2021-07-10

## 2021-07-10 RX ORDER — BACITRACIN, NEOMYCIN, POLYMYXIN B 400; 3.5; 5 [USP'U]/G; MG/G; [USP'U]/G
1 OINTMENT TOPICAL ONCE
Status: COMPLETED | OUTPATIENT
Start: 2021-07-10 | End: 2021-07-10

## 2021-07-10 RX ADMIN — TETANUS TOXOID, REDUCED DIPHTHERIA TOXOID AND ACELLULAR PERTUSSIS VACCINE, ADSORBED 0.5 ML: 5; 2.5; 8; 8; 2.5 SUSPENSION INTRAMUSCULAR at 21:59

## 2021-07-10 RX ADMIN — BACITRACIN, NEOMYCIN, POLYMYXIN B 1 SMALL APPLICATION: 400; 3.5; 5 OINTMENT TOPICAL at 23:45

## 2021-07-10 RX ADMIN — LIDOCAINE HYDROCHLORIDE 1 APPLICATION: 20 JELLY TOPICAL at 22:00

## 2021-07-10 RX ADMIN — IBUPROFEN 600 MG: 600 TABLET, FILM COATED ORAL at 23:36

## 2021-07-11 VITALS
HEART RATE: 65 BPM | DIASTOLIC BLOOD PRESSURE: 86 MMHG | OXYGEN SATURATION: 100 % | RESPIRATION RATE: 24 BRPM | HEIGHT: 66 IN | SYSTOLIC BLOOD PRESSURE: 122 MMHG | WEIGHT: 285 LBS | BODY MASS INDEX: 45.8 KG/M2 | TEMPERATURE: 98.9 F

## 2021-07-11 LAB
ATRIAL RATE: 75 BPM
P AXIS: 32 DEGREES
PR INTERVAL: 170 MS
QRS AXIS: 89 DEGREES
QRSD INTERVAL: 86 MS
QT INTERVAL: 354 MS
QTC INTERVAL: 395 MS
T WAVE AXIS: 35 DEGREES
VENTRICULAR RATE: 75 BPM

## 2021-07-11 PROCEDURE — 93010 ELECTROCARDIOGRAM REPORT: CPT | Performed by: INTERNAL MEDICINE
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
## 2021-07-13 ENCOUNTER — VBI (OUTPATIENT)
Dept: FAMILY MEDICINE CLINIC | Facility: CLINIC | Age: 31
End: 2021-07-13

## 2021-07-19 ENCOUNTER — OFFICE VISIT (OUTPATIENT)
Dept: FAMILY MEDICINE CLINIC | Facility: CLINIC | Age: 31
End: 2021-07-19
Payer: COMMERCIAL

## 2021-07-19 VITALS
TEMPERATURE: 97.9 F | RESPIRATION RATE: 16 BRPM | BODY MASS INDEX: 46.25 KG/M2 | HEIGHT: 66 IN | OXYGEN SATURATION: 99 % | SYSTOLIC BLOOD PRESSURE: 110 MMHG | DIASTOLIC BLOOD PRESSURE: 70 MMHG | WEIGHT: 287.8 LBS | HEART RATE: 81 BPM

## 2021-07-19 DIAGNOSIS — Z48.02 ENCOUNTER FOR STAPLE REMOVAL: Primary | ICD-10-CM

## 2021-07-19 DIAGNOSIS — E66.01 CLASS 3 SEVERE OBESITY DUE TO EXCESS CALORIES WITHOUT SERIOUS COMORBIDITY WITH BODY MASS INDEX (BMI) OF 45.0 TO 49.9 IN ADULT (HCC): ICD-10-CM

## 2021-07-19 DIAGNOSIS — F33.1 DEPRESSION, MAJOR, RECURRENT, MODERATE (HCC): ICD-10-CM

## 2021-07-19 PROCEDURE — 99214 OFFICE O/P EST MOD 30 MIN: CPT | Performed by: FAMILY MEDICINE

## 2021-07-19 PROCEDURE — 3725F SCREEN DEPRESSION PERFORMED: CPT | Performed by: FAMILY MEDICINE

## 2021-07-19 PROCEDURE — 1036F TOBACCO NON-USER: CPT | Performed by: FAMILY MEDICINE
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
## 2021-07-26 ENCOUNTER — TELEPHONE (OUTPATIENT)
Dept: FAMILY MEDICINE CLINIC | Facility: CLINIC | Age: 31
End: 2021-07-26

## 2021-07-26 NOTE — TELEPHONE ENCOUNTER
Pt calling to see if she can get her hair colored and cut or if that will affect whatever is going on  She didn't specify
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
## 2021-07-30 ENCOUNTER — OFFICE VISIT (OUTPATIENT)
Dept: BARIATRICS | Facility: CLINIC | Age: 31
End: 2021-07-30

## 2021-07-30 ENCOUNTER — PREP FOR PROCEDURE (OUTPATIENT)
Dept: BARIATRICS | Facility: CLINIC | Age: 31
End: 2021-07-30

## 2021-07-30 VITALS — HEIGHT: 66 IN | BODY MASS INDEX: 45.8 KG/M2 | WEIGHT: 285 LBS

## 2021-07-30 DIAGNOSIS — Z71.89 ENCOUNTER FOR PRE-BARIATRIC SURGERY COUNSELING AND EDUCATION: Primary | ICD-10-CM

## 2021-07-30 DIAGNOSIS — E66.01 MORBID OBESITY (HCC): Primary | ICD-10-CM

## 2021-07-30 PROCEDURE — 3008F BODY MASS INDEX DOCD: CPT | Performed by: FAMILY MEDICINE

## 2021-07-30 PROCEDURE — RECHECK

## 2021-08-04 ENCOUNTER — TELEPHONE (OUTPATIENT)
Dept: BARIATRICS | Facility: CLINIC | Age: 31
End: 2021-08-04

## 2021-08-04 NOTE — TELEPHONE ENCOUNTER
Tried to reach out to pt to cancel her EGD, however her voicemail is not set up to leave messages  I will try again later today

## 2021-08-04 NOTE — TELEPHONE ENCOUNTER
Called patient to discuss that EGD has been canceled due to patient needing to quit vaping  Patient did not answer and unable to leave VM due to VM not accepting messages  Will follow up with patient at scheduled follow up appointment   HC REGINAW

## 2021-08-04 NOTE — TELEPHONE ENCOUNTER
Spoke with pt to cancel her egd   She is aware that it had to be rescheduled because she di not have her blood work don  Although she thought she had done her blood work   Wanted to speak to [facility] so I told [facility] and she stated she would reach out to Pt,

## 2021-08-11 ENCOUNTER — TELEPHONE (OUTPATIENT)
Dept: BARIATRICS | Facility: CLINIC | Age: 31
End: 2021-08-11

## 2021-08-11 NOTE — TELEPHONE ENCOUNTER
Called patient after she had left a VM asking about the bloodwork that she would need to have done  Patient did not answer and unable to leave voicemail-no voicemail   HC LCSW

## 2021-08-11 NOTE — TELEPHONE ENCOUNTER
Patient called in to see what bloodwork was needed  Advised of the tests she needs done  Due to a family tragedy, she will not be able to get the bloodwork done until next week  But she assured me she will go next week to have it taken care of

## 2021-08-24 DIAGNOSIS — F33.1 DEPRESSION, MAJOR, RECURRENT, MODERATE (HCC): ICD-10-CM

## 2021-08-24 RX ORDER — SERTRALINE HYDROCHLORIDE 100 MG/1
100 TABLET, FILM COATED ORAL
Qty: 90 TABLET | Refills: 3 | Status: SHIPPED | OUTPATIENT
Start: 2021-08-24 | End: 2022-05-17 | Stop reason: SDUPTHER

## 2021-08-24 NOTE — TELEPHONE ENCOUNTER
Medication:sertraline (ZOLOFT) 100 mg tablet       Dosage:  How Often:Sig: Take 1 tablet (100 mg total) by mouth daily at bedtime  Quantity:  90  Last Office Visit: 7/19/2021  Next Office Visit: 11/10/2021  Last refilled: written 5/4/2021  How many pills left:  Pharmacy:   [facility]/pharmacy #[id number] [place] - [address] [facility] [place] [address]  Phone: [phone] Fax: [phone]

## 2021-08-25 ENCOUNTER — LAB (OUTPATIENT)
Dept: LAB | Facility: CLINIC | Age: 31
End: 2021-08-25
Payer: COMMERCIAL

## 2021-08-25 ENCOUNTER — VBI (OUTPATIENT)
Dept: ADMINISTRATIVE | Facility: OTHER | Age: 31
End: 2021-08-25

## 2021-08-25 DIAGNOSIS — Z01.818 PREOP TESTING: ICD-10-CM

## 2021-08-25 DIAGNOSIS — E66.01 MORBID (SEVERE) OBESITY DUE TO EXCESS CALORIES (HCC): ICD-10-CM

## 2021-08-25 LAB
ALBUMIN SERPL BCP-MCNC: 3.4 G/DL (ref 3.5–5)
ALP SERPL-CCNC: 94 U/L (ref 46–116)
ALT SERPL W P-5'-P-CCNC: 17 U/L (ref 12–78)
ANION GAP SERPL CALCULATED.3IONS-SCNC: 5 MMOL/L (ref 4–13)
AST SERPL W P-5'-P-CCNC: 28 U/L (ref 5–45)
BILIRUB SERPL-MCNC: 0.47 MG/DL (ref 0.2–1)
BUN SERPL-MCNC: 11 MG/DL (ref 5–25)
CALCIUM ALBUM COR SERPL-MCNC: 10.5 MG/DL (ref 8.3–10.1)
CALCIUM SERPL-MCNC: 10 MG/DL (ref 8.3–10.1)
CHLORIDE SERPL-SCNC: 107 MMOL/L (ref 100–108)
CHOLEST SERPL-MCNC: 176 MG/DL (ref 50–200)
CO2 SERPL-SCNC: 25 MMOL/L (ref 21–32)
CREAT SERPL-MCNC: 0.9 MG/DL (ref 0.6–1.3)
ERYTHROCYTE [DISTWIDTH] IN BLOOD BY AUTOMATED COUNT: 13.7 % (ref 11.6–15.1)
EST. AVERAGE GLUCOSE BLD GHB EST-MCNC: 114 MG/DL
GFR SERPL CREATININE-BSD FRML MDRD: 86 ML/MIN/1.73SQ M
GLUCOSE P FAST SERPL-MCNC: 97 MG/DL (ref 65–99)
HBA1C MFR BLD: 5.6 %
HCT VFR BLD AUTO: 37.8 % (ref 34.8–46.1)
HDLC SERPL-MCNC: 50 MG/DL
HGB BLD-MCNC: 11.9 G/DL (ref 11.5–15.4)
LDLC SERPL CALC-MCNC: 104 MG/DL (ref 0–100)
MCH RBC QN AUTO: 27.2 PG (ref 26.8–34.3)
MCHC RBC AUTO-ENTMCNC: 31.5 G/DL (ref 31.4–37.4)
MCV RBC AUTO: 87 FL (ref 82–98)
NONHDLC SERPL-MCNC: 126 MG/DL
PLATELET # BLD AUTO: 284 THOUSANDS/UL (ref 149–390)
PMV BLD AUTO: 11.3 FL (ref 8.9–12.7)
POTASSIUM SERPL-SCNC: 4 MMOL/L (ref 3.5–5.3)
PROT SERPL-MCNC: 8.6 G/DL (ref 6.4–8.2)
RBC # BLD AUTO: 4.37 MILLION/UL (ref 3.81–5.12)
SODIUM SERPL-SCNC: 137 MMOL/L (ref 136–145)
TRIGL SERPL-MCNC: 110 MG/DL
TSH SERPL DL<=0.05 MIU/L-ACNC: 3.69 UIU/ML (ref 0.36–3.74)
WBC # BLD AUTO: 7.23 THOUSAND/UL (ref 4.31–10.16)

## 2021-08-25 PROCEDURE — 84443 ASSAY THYROID STIM HORMONE: CPT

## 2021-08-25 PROCEDURE — 83036 HEMOGLOBIN GLYCOSYLATED A1C: CPT

## 2021-08-25 PROCEDURE — 85027 COMPLETE CBC AUTOMATED: CPT

## 2021-08-25 PROCEDURE — G0480 DRUG TEST DEF 1-7 CLASSES: HCPCS

## 2021-08-25 PROCEDURE — 36415 COLL VENOUS BLD VENIPUNCTURE: CPT

## 2021-08-25 PROCEDURE — 80061 LIPID PANEL: CPT

## 2021-08-25 PROCEDURE — 80323 ALKALOIDS NOS: CPT

## 2021-08-25 PROCEDURE — 80053 COMPREHEN METABOLIC PANEL: CPT

## 2021-08-30 LAB
COTININE SERPL-MCNC: 329.6 NG/ML
NICOTINE SERPL-MCNC: 6.7 NG/ML

## 2021-09-01 ENCOUNTER — OFFICE VISIT (OUTPATIENT)
Dept: BARIATRICS | Facility: CLINIC | Age: 31
End: 2021-09-01

## 2021-09-01 DIAGNOSIS — E66.01 MORBID (SEVERE) OBESITY DUE TO EXCESS CALORIES (HCC): Primary | ICD-10-CM

## 2021-09-01 PROCEDURE — RECHECK
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
## 2021-11-10 ENCOUNTER — OFFICE VISIT (OUTPATIENT)
Dept: FAMILY MEDICINE CLINIC | Facility: CLINIC | Age: 31
End: 2021-11-10
Payer: COMMERCIAL

## 2021-11-10 VITALS
WEIGHT: 290.6 LBS | SYSTOLIC BLOOD PRESSURE: 110 MMHG | BODY MASS INDEX: 46.7 KG/M2 | TEMPERATURE: 97.7 F | RESPIRATION RATE: 16 BRPM | HEIGHT: 66 IN | DIASTOLIC BLOOD PRESSURE: 72 MMHG | OXYGEN SATURATION: 99 % | HEART RATE: 85 BPM

## 2021-11-10 DIAGNOSIS — E66.01 CLASS 3 SEVERE OBESITY DUE TO EXCESS CALORIES WITHOUT SERIOUS COMORBIDITY WITH BODY MASS INDEX (BMI) OF 45.0 TO 49.9 IN ADULT (HCC): ICD-10-CM

## 2021-11-10 DIAGNOSIS — F51.01 PRIMARY INSOMNIA: ICD-10-CM

## 2021-11-10 DIAGNOSIS — Z23 NEED FOR INFLUENZA VACCINATION: ICD-10-CM

## 2021-11-10 DIAGNOSIS — F33.1 DEPRESSION, MAJOR, RECURRENT, MODERATE (HCC): ICD-10-CM

## 2021-11-10 DIAGNOSIS — Z00.00 ANNUAL PHYSICAL EXAM: Primary | ICD-10-CM

## 2021-11-10 PROCEDURE — 90686 IIV4 VACC NO PRSV 0.5 ML IM: CPT | Performed by: FAMILY MEDICINE

## 2021-11-10 PROCEDURE — 90471 IMMUNIZATION ADMIN: CPT | Performed by: FAMILY MEDICINE

## 2021-11-10 PROCEDURE — 99395 PREV VISIT EST AGE 18-39: CPT | Performed by: FAMILY MEDICINE

## 2021-11-10 PROCEDURE — 1036F TOBACCO NON-USER: CPT | Performed by: FAMILY MEDICINE

## 2021-11-10 PROCEDURE — 3008F BODY MASS INDEX DOCD: CPT | Performed by: FAMILY MEDICINE

## 2021-11-10 RX ORDER — HYDROXYZINE 50 MG/1
50 TABLET, FILM COATED ORAL
Qty: 30 TABLET | Refills: 0 | Status: SHIPPED | OUTPATIENT
Start: 2021-11-10 | End: 2021-12-03

## 2021-11-18 ENCOUNTER — APPOINTMENT (OUTPATIENT)
Dept: LAB | Facility: CLINIC | Age: 31
End: 2021-11-18
Payer: COMMERCIAL

## 2021-11-18 DIAGNOSIS — Z72.0 NICOTINE ABUSE: ICD-10-CM

## 2021-11-18 DIAGNOSIS — Z72.0 NICOTINE ABUSE: Primary | ICD-10-CM

## 2021-11-18 DIAGNOSIS — Z72.0 TOBACCO ABUSE: ICD-10-CM

## 2021-11-18 PROCEDURE — G0480 DRUG TEST DEF 1-7 CLASSES: HCPCS

## 2021-11-18 PROCEDURE — 80323 ALKALOIDS NOS: CPT

## 2021-11-23 LAB
COTININE SERPL-MCNC: 13.6 NG/ML
NICOTINE SERPL-MCNC: <1 NG/ML

## 2021-12-01 ENCOUNTER — OFFICE VISIT (OUTPATIENT)
Dept: BARIATRICS | Facility: CLINIC | Age: 31
End: 2021-12-01

## 2021-12-01 VITALS — WEIGHT: 286.8 LBS | HEIGHT: 66 IN | BODY MASS INDEX: 46.09 KG/M2

## 2021-12-01 DIAGNOSIS — Z71.89 ENCOUNTER FOR PRE-BARIATRIC SURGERY COUNSELING AND EDUCATION: Primary | ICD-10-CM

## 2021-12-01 DIAGNOSIS — F33.1 DEPRESSION, MAJOR, RECURRENT, MODERATE (HCC): ICD-10-CM

## 2021-12-01 PROCEDURE — RECHECK

## 2021-12-01 PROCEDURE — 3008F BODY MASS INDEX DOCD: CPT | Performed by: FAMILY MEDICINE

## 2021-12-03 DIAGNOSIS — F51.01 PRIMARY INSOMNIA: ICD-10-CM

## 2021-12-03 RX ORDER — HYDROXYZINE 50 MG/1
50 TABLET, FILM COATED ORAL
Qty: 30 TABLET | Refills: 0 | Status: SHIPPED | OUTPATIENT
Start: 2021-12-03 | End: 2022-04-22 | Stop reason: SDUPTHER

## 2021-12-06 ENCOUNTER — TELEPHONE (OUTPATIENT)
Dept: PSYCHIATRY | Facility: CLINIC | Age: 31
End: 2021-12-06

## 2021-12-15 NOTE — PROGRESS NOTES
Assessment/Plan:    No problem-specific Assessment & Plan notes found for this encounter  Diagnoses and all orders for this visit:    Class 3 severe obesity due to excess calories without serious comorbidity with body mass index (BMI) of 45 0 to 49 9 in adult [facility])  Comments:  currently through bariatric team   Orders:  -     Comprehensive metabolic panel    Depression, major, recurrent, moderate (HCC)  -     Comprehensive metabolic panel  -     sertraline (ZOLOFT) 100 mg tablet; Take 1 tablet (100 mg total) by mouth daily at bedtime    Primary insomnia  Comments:  sleeping well without any meds          Subjective:      Patient ID: Pramod Ndiaye is a 27 y o  female  Here for follow up  Trying to eat better  Drinking more water   Breakfast: smoothies with strawberry, peaches and peanut butter  Lunch: sandwich and veggies   Dinner: meat and vegetables      Anxiety  Presents for follow-up visit  Symptoms include depressed mood, irritability and nervous/anxious behavior  Patient reports no chest pain, compulsions, confusion, decreased concentration, dizziness, dry mouth, excessive worry, feeling of choking, hyperventilation, impotence, insomnia, malaise, muscle tension, nausea, obsessions, palpitations, panic, restlessness, shortness of breath or suicidal ideas  Symptoms occur occasionally  The quality of sleep is good  Compliance with medications is %  The following portions of the patient's history were reviewed and updated as appropriate: allergies, current medications, past family history, past medical history, past social history, past surgical history and problem list     Review of Systems   Constitutional: Positive for irritability  Respiratory: Negative for shortness of breath  Cardiovascular: Negative for chest pain and palpitations  Gastrointestinal: Negative for nausea  Genitourinary: Negative for impotence  Neurological: Negative for dizziness  Psychiatric/Behavioral: Negative for confusion, decreased concentration and suicidal ideas  The patient is nervous/anxious  The patient does not have insomnia  Objective:      /72 (BP Location: Left arm, Patient Position: Sitting, Cuff Size: Large)   Pulse 83   Temp (!) 96 1 °F (35 6 °C) (Tympanic)   Resp 16   Ht 5' 6 3" (1 684 m)   Wt 128 kg (283 lb)   SpO2 98%   BMI 45 27 kg/m²          Physical Exam  Constitutional:       Appearance: Normal appearance  Cardiovascular:      Rate and Rhythm: Normal rate and regular rhythm  Pulses: Normal pulses  Heart sounds: Normal heart sounds  Neurological:      General: No focal deficit present  Mental Status: She is alert and oriented to person, place, and time     Psychiatric:         Mood and Affect: Mood normal          Behavior: Behavior normal  (0) swallows foods and liquids w/o difficulty

## 2021-12-30 NOTE — PATIENT INSTRUCTIONS
Breast Self Exam for Women   AMBULATORY CARE:   A breast self-exam (BSE)  is a way to check your breasts for lumps and other changes  Regular BSEs can help you know how your breasts normally look and feel  Most breast lumps or changes are not cancer, but you should always have them checked by a healthcare provider  Why you should do a BSE:  Breast cancer is the most common type of cancer in women  Even if you have mammograms, you may still want to do a BSE regularly  If you know how your breasts normally feel and look, it may help you know when to contact your healthcare provider  Mammograms can miss some cancers  You may find a lump during a BSE that did not show up on a mammogram   When you should do a BSE:  If you have periods, you may want to do your BSE 1 week after your period ends  This is the time when your breasts may be the least swollen, lumpy, or tender  You can do regular BSEs even if you are breastfeeding or have breast implants  Call your doctor if:   · You find any lumps or changes in your breasts  · You have breast pain or fluid coming from your nipples  · You have questions or concerns about your condition or care  How to do a BSE:       · Look at your breasts in a mirror  Look at the size and shape of each breast and nipple  Check for swelling, lumps, dimpling, scaly skin, or other skin changes  Look for nipple changes, such as a nipple that is painful or beginning to pull inward  Gently squeeze both nipples and check to see if fluid (that is not breast milk) comes out of them  If you find any of these or other breast changes, contact your healthcare provider  Check your breasts while you sit or  the following 3 positions:    ? Hang your arms down at your sides  ? Raise your hands and join them behind your head  ? Put firm pressure with your hands on your hips  Bend slightly forward while you look at your breasts in the mirror  · Lie down and feel your breasts    When you lie down, your breast tissue spreads out evenly over your chest  This makes it easier for you to feel for lumps and anything that may not be normal for your breasts  Do a BSE on one breast at a time  ? Place a small pillow or towel under your left shoulder  Put your left arm behind your head  ? Use the 3 middle fingers of your right hand  Use your fingertip pads, on the top of your fingers  Your fingertip pad is the most sensitive part of your finger  ? Use small circles to feel your breast tissue  Use your fingertip pads to make dime-sized, overlapping circles on your breast and armpits  Use light, medium, and firm pressure  First, press lightly  Second, press with medium pressure to feel a little deeper into the breast  Last, use firm pressure to feel deep within your breast     ? Examine your entire breast area  Examine the breast area from above the breast to below the breast where you feel only ribs  Make small circles with your fingertips, starting in the middle of your armpit  Make circles going up and down the breast area  Continue toward your breast and all the way across it  Examine the area from your armpit all the way over to the middle of your chest (breastbone)  Stop at the middle of your chest     ? Move the pillow or towel to your right shoulder, and put your right arm behind your head  Use the 3 fingertip pads of your left hand, and repeat the above steps to do a BSE on your right breast     What else you can do to check for breast problems or cancer:  Talk to your healthcare provider about mammograms  A mammogram is an x-ray of your breasts to screen for breast cancer or other problems  Your provider can tell you the benefits and risks of mammograms  The first mammogram is usually at age 39 or 48  Your provider may recommend you start at 36 or younger if your risk for breast cancer is high  Mammograms usually continue every 1 to 2 years until age 76         Follow up with your doctor as directed:  Write down your questions so you remember to ask them during your visits  © Copyright [employer] 2021 Information is for End User's use only and may not be sold, redistributed or otherwise used for commercial purposes  All illustrations and images included in CareNotes® are the copyrighted property of [employer]  or [employer]acy [employer]  The above information is an  only  It is not intended as medical advice for individual conditions or treatments  Talk to your doctor, nurse or pharmacist before following any medical regimen to see if it is safe and effective for you  Wellness Visit for Adults   AMBULATORY CARE:   A wellness visit  is when you see your healthcare provider to get screened for health problems  Your healthcare provider will also give you advice on how to stay healthy  Write down your questions so you remember to ask them  Ask your healthcare provider how often you should have a wellness visit  What happens at a wellness visit:  Your healthcare provider will ask about your health, and your family history of health problems  This includes high blood pressure, heart disease, and cancer  He or she will ask if you have symptoms that concern you, if you smoke, and about your mood  You may also be asked about your intake of medicines, supplements, food, and alcohol  Any of the following may be done:  · Your weight  will be checked  Your height may also be checked so your body mass index (BMI) can be calculated  Your BMI shows if you are at a healthy weight  · Your blood pressure  and heart rate will be checked  Your temperature may also be checked  · Blood and urine tests  may be done  Blood tests may be done to check your cholesterol levels  Abnormal cholesterol levels increase your risk for heart disease and stroke  You may also need a blood or urine test to check for diabetes if you are at increased risk   Urine tests may be done to look for signs of an infection or kidney disease  · A physical exam  includes checking your heartbeat and lungs with a stethoscope  Your healthcare provider may also check your skin to look for sun damage  · Screening tests  may be recommended  A screening test is done to check for diseases that may not cause symptoms  The screening tests you may need depend on your age, gender, family history, and lifestyle habits  For example, colorectal screening may be recommended if you are 48years old or older  Screening tests you need if you are a woman:   · A Pap smear  is used to screen for cervical cancer  Pap smears are usually done every 3 to 5 years depending on your age  You may need them more often if you have had abnormal Pap smear test results in the past  Ask your healthcare provider how often you should have a Pap smear  · A mammogram  is an x-ray of your breasts to screen for breast cancer  Experts recommend mammograms every 2 years starting at age 48 years  You may need a mammogram at age 52 years or younger if you have an increased risk for breast cancer  Talk to your healthcare provider about when you should start having mammograms and how often you need them  Vaccines you may need:   · Get an influenza vaccine  every year  The influenza vaccine protects you from the flu  Several types of viruses cause the flu  The viruses change over time, so new vaccines are made each year  · Get a tetanus-diphtheria (Td) booster vaccine  every 10 years  This vaccine protects you against tetanus and diphtheria  Tetanus is a severe infection that may cause painful muscle spasms and lockjaw  Diphtheria is a severe bacterial infection that causes a thick covering in the back of your mouth and throat  · Get a human papillomavirus (HPV) vaccine  if you are female and aged 23 to 32 or male 23 to 24 and never received it  This vaccine protects you from HPV infection  HPV is the most common infection spread by sexual contact   HPV may also cause vaginal, penile, and anal cancers  · Get a pneumococcal vaccine  if you are aged 72 years or older  The pneumococcal vaccine is an injection given to protect you from pneumococcal disease  Pneumococcal disease is an infection caused by pneumococcal bacteria  The infection may cause pneumonia, meningitis, or an ear infection  · Get a shingles vaccine  if you are 60 or older, even if you have had shingles before  The shingles vaccine is an injection to protect you from the varicella-zoster virus  This is the same virus that causes chickenpox  Shingles is a painful rash that develops in people who had chickenpox or have been exposed to the virus  How to eat healthy:  My Plate is a model for planning healthy meals  It shows the types and amounts of foods that should go on your plate  Fruits and vegetables make up about half of your plate, and grains and protein make up the other half  A serving of dairy is included on the side of your plate  The amount of calories and serving sizes you need depends on your age, gender, weight, and height  Examples of healthy foods are listed below:  · Eat a variety of vegetables  such as dark green, red, and orange vegetables  You can also include canned vegetables low in sodium (salt) and frozen vegetables without added butter or sauces  · Eat a variety of fresh fruits , canned fruit in 100% juice, frozen fruit, and dried fruit  · Include whole grains  At least half of the grains you eat should be whole grains  Examples include whole-wheat bread, wheat pasta, brown rice, and whole-grain cereals such as oatmeal     · Eat a variety of protein foods such as seafood (fish and shellfish), lean meat, and poultry without skin (turkey and chicken)  Examples of lean meats include pork leg, shoulder, or tenderloin, and beef round, sirloin, tenderloin, and extra lean ground beef   Other protein foods include eggs and egg substitutes, beans, peas, soy products, nuts, and seeds  · Choose low-fat dairy products such as skim or 1% milk or low-fat yogurt, cheese, and cottage cheese  · Limit unhealthy fats  such as butter, hard margarine, and shortening  Exercise:  Exercise at least 30 minutes per day on most days of the week  Some examples of exercise include walking, biking, dancing, and swimming  You can also fit in more physical activity by taking the stairs instead of the elevator or parking farther away from stores  Include muscle strengthening activities 2 days each week  Regular exercise provides many health benefits  It helps you manage your weight, and decreases your risk for type 2 diabetes, heart disease, stroke, and high blood pressure  Exercise can also help improve your mood  Ask your healthcare provider about the best exercise plan for you  General health and safety guidelines:   · Do not smoke  Nicotine and other chemicals in cigarettes and cigars can cause lung damage  Ask your healthcare provider for information if you currently smoke and need help to quit  E-cigarettes or smokeless tobacco still contain nicotine  Talk to your healthcare provider before you use these products  · Limit alcohol  A drink of alcohol is 12 ounces of beer, 5 ounces of wine, or 1½ ounces of liquor  · Lose weight, if needed  Being overweight increases your risk of certain health conditions  These include heart disease, high blood pressure, type 2 diabetes, and certain types of cancer  · Protect your skin  Do not sunbathe or use tanning beds  Use sunscreen with a SPF 15 or higher  Apply sunscreen at least 15 minutes before you go outside  Reapply sunscreen every 2 hours  Wear protective clothing, hats, and sunglasses when you are outside  · Drive safely  Always wear your seatbelt  Make sure everyone in your car wears a seatbelt  A seatbelt can save your life if you are in an accident  Do not use your cell phone when you are driving   This could distract you and cause an accident  Pull over if you need to make a call or send a text message  · Practice safe sex  Use latex condoms if are sexually active and have more than one partner  Your healthcare provider may recommend screening tests for sexually transmitted infections (STIs)  · Wear helmets, lifejackets, and protective gear  Always wear a helmet when you ride a bike or motorcycle, go skiing, or play sports that could cause a head injury  Wear protective equipment when you play sports  Wear a lifejacket when you are on a boat or doing water sports  © Copyright [employer] 2021 Information is for End User's use only and may not be sold, redistributed or otherwise used for commercial purposes  All illustrations and images included in CareNotes® are the copyrighted property of [employer]  or Stacy West[employer]  The above information is an  only  It is not intended as medical advice for individual conditions or treatments  Talk to your doctor, nurse or pharmacist before following any medical regimen to see if it is safe and effective for you  HPV (Human Papillomavirus) Vaccine for Adults   AMBULATORY CARE:   The human papillomavirus (HPV) vaccine  is an injection given to females and males to protect against human papillomavirus infection  HPV is most commonly spread through sexual activity  It can also be spread from a mother to her baby during delivery  The HPV vaccine is most effective if given before sexual activity begins  This allows your body to build almost complete protection against HPV before you have contact with the virus  The HPV vaccine is still effective after sexual activity has begun  How the vaccine is given:  The HPV vaccine can be given with other vaccines  The vaccine is given in 2 or 3 doses through age 32:  · The first dose  is given at any time  · The second dose  is given 1 to 2 months after the first dose      · The third dose, if needed,  is given 6 months after the first dose  Reasons you should not get the HPV vaccine, or should wait to get it:   · You had a severe allergic reaction to a dose of the vaccine  · You are pregnant  Your healthcare provider will tell you when you can get the vaccine  · You are sick or have a fever  You may need to wait to get the vaccine until symptoms go away  Risks of the HPV vaccine: You may have pain, redness, or swelling where the shot was given  You may have a fever or headache  You may have an allergic reaction to the vaccine  This can be life-threatening  Call your local emergency number (911 in the [address]) if:   · You have signs of a severe allergic reaction, such as trouble breathing, hives, or wheezing  Seek care immediately if:   · You have a high fever or behavior changes that concern you  Call your doctor if:   · You have questions or concerns about the HPV vaccine  Apply a warm compress  to the area to relieve swelling and pain  Follow up with your doctor as directed:  Write down your questions so you remember to ask them during your visits  © Copyright [employer] 2021 Information is for End User's use only and may not be sold, redistributed or otherwise used for commercial purposes  All illustrations and images included in CareNotes® are the copyrighted property of A D A M [employer] Inc  or [address] [place], [place] [address]delfina yovany   The above information is an  only  It is not intended as medical advice for individual conditions or treatments  Talk to your doctor, nurse or pharmacist before following any medical regimen to see if it is safe and effective for you  Perineal Hygiene     No soaps or feminine wash to the vulva  Use only water to cleanse, or water with Dove or [employer] if necessary  No lotion to the area    Use only coconut oil for moisture if needed   No douching     Cotton underware, loose fitting clothing  Only perfume-free, dye-free laundry detergent, use a second rinse cycle   Avoid fabric softeners/dryer sheets  Coconut oil as a lubricant (if not using condoms) or another scent-free lubricant (Astroglide, Uberlube) if needed  Partner to avoid the same products as well  Over the counter probiotic to restore vaginal chary may be helpful as well     You may also look into Boric Acid vaginal suppositories to restore vaginal PH balance for up to 2 weeks as directed on the box  You may not use these if you are pregnant  Kegel Exercises for Women   AMBULATORY CARE:   Kegel exercises  help strengthen your pelvic muscles  Pelvic muscles hold your pelvic organs, such as your bladder and uterus, in place  Kegel exercises help prevent or control problems with urine incontinence (leakage)  Incontinence may be caused by pregnancy, childbirth, or menopause  Contact your healthcare provider if:   · You cannot feel your muscles tighten or relax  · You continue to leak urine  · You have questions or concerns about your condition or care  Use the correct muscles:  Pelvic muscles are the muscles you use to control urine flow  To target these muscles, stop and start the flow of urine several times  This will help you become familiar with how it feels to tighten and relax these muscles  How to do Kegel exercises:   · Empty your bladder  You may lie down, stand up, or sit down to do these exercises  When you first try to do these exercises, it may be easier if you lie down  Tighten or squeeze your pelvic muscles slowly  It may feel like you are trying to hold back urine or gas  Hold this position for 3 seconds  Relax for 3 seconds  Repeat this cycle 10 times  · Do 10 sets of Kegel exercises, at least 3 times a day  Do not hold your breath when you do Kegel exercises  Keep your stomach, back, and leg muscles relaxed  · As your muscles get stronger, you will be able to hold the squeeze longer   Your healthcare provider may ask that you increase your pelvic muscle squeeze to 10 seconds  After you squeeze for 10 seconds, relax for 10 seconds  What else you should know:   · Once you know how to do Kegel exercises, use different positions  You can do these exercises while you lie on the floor, sit at your desk or watch TV, and while you stand  · You may notice improved bladder control within about 6 weeks  · Tighten your pelvic muscles before you sneeze, cough, or lift to prevent urine leakage  Follow up with your doctor as directed:  Write down your questions so you remember to ask them during your visits  © Copyright [employer] 2021 Information is for End User's use only and may not be sold, redistributed or otherwise used for commercial purposes  All illustrations and images included in CareNotes® are the copyrighted property of [employer]  or Stacy Westfall  The above information is an  only  It is not intended as medical advice for individual conditions or treatments  Talk to your doctor, nurse or pharmacist before following any medical regimen to see if it is safe and effective for you

## 2021-12-30 NOTE — PROGRESS NOTES
Diagnoses and all orders for this visit:    Encounter for gynecological examination without abnormal finding  -     Liquid-based pap, screening    Screening for STDs (sexually transmitted diseases)  -     Chlamydia/GC amplified DNA by PCR    Malpositioned intrauterine device (IUD), initial encounter  -     US pelvis complete w transvaginal; Future        Health Maintenance:    Last PAP:  &  Neg,    Next PAP Due: collected today     Gardisil:  Completed     Pleasant 32 y o  premenopausal female here for annual exam , twin girls in 2015,  GYN hx includes:  No personal or family hx of breast, cervical, ovarian or colon CA  Paraguard placed at [facility] in   Pt thought she had a Mirena   Reports   regular cycles, with mod flow, & mod cramping  Denies history of abnormal pap smears  Denies vaginal, urinary or breast issues, today  Denies pelvic pain & dyspareunia  Not Sexually active at this time  Not in a relationship, declines STD testing     Denies any issues with her BCM, which is Paraguard   Denies intimate partner violence    Past Medical History:   Diagnosis Date    Closed disp fx of styloid process of left radius with routine healing     Last Assessed 2017    Depression     Obesity, Class III, BMI 40-49 9 (morbid obesity) ([facility] [address] )     Psychiatric disorder     "mood swings"    Seizure [facility])      Past Surgical History:   Procedure Laterality Date     SECTION      WISDOM TOOTH EXTRACTION      all 4       Immunization History   Administered Date(s) Administered    COVID-19 J&J (Ramiro) vaccine 0 5 mL 2021    DTP 11/10/1992, 1994, 1995, 2014    DTaP 11/10/1992, 1994, 1995    HPV Quadrivalent 2007, 2007, 2008    Hep A, ped/adol, 2 dose 2007, 2008    Hep B, Adolescent or Pediatric 1995, 2007, 2008    Hepatitis A 2007, 2008    INFLUENZA 2007, 2008, 2012, 2013, 10/23/2014, 2015, 2015, 10/11/2016, 10/11/2016    IPV 11/10/1992, 1994, 1995    Influenza Quadrivalent Preservative Free 3 years and older IM 2016, 2017    Influenza, injectable, quadrivalent, preservative free 0 5 mL 2019, 2019, 2020, 11/10/2021    MMR 1992, 1994    Meningococcal MCV4P 2007    Rho (D) Immune Globulin 2014, 2014    Tdap 2007, 2015, 07/10/2021    Tuberculin Skin Test-PPD Intradermal 2007    Varicella 1992, 1994       Family History   Problem Relation Age of Onset    Hypertension Mother     No Known Problems Father     Obesity Daughter      Social History     Tobacco Use    Smoking status: Former Smoker     Types: Cigarettes     Quit date:      Years since quittin 0    Smokeless tobacco: Current User   Vaping Use    Vaping Use: Every day    Substances: Nicotine, Flavoring   Substance Use Topics    Alcohol use: Yes     Comment: rarely    Drug use: No       Current Outpatient Medications:     multivitamin (THERAGRAN) TABS, Take 1 tablet by mouth daily, Disp: , Rfl:     sertraline (ZOLOFT) 100 mg tablet, Take 1 tablet (100 mg total) by mouth daily at bedtime, Disp: 90 tablet, Rfl: 3    hydrOXYzine HCL (ATARAX) 50 mg tablet, TAKE 1 TABLET (50 MG TOTAL) BY MOUTH DAILY AT BEDTIME AS NEEDED FOR ANXIETY, Disp: 30 tablet, Rfl: 0  Patient Active Problem List    Diagnosis Date Noted    Primary insomnia 2021    Obesity due to excess calories without serious comorbidity 2021    Depression, major, recurrent, moderate ([facility] [address] ) 2017    Family history of ischemic heart disease (IHD) 06/10/2011       No Known Allergies    OB History   No obstetric history on file         Vitals:    22 0937   BP: 122/66   BP Location: Left arm   Patient Position: Sitting   Cuff Size: Large   Weight: 130 kg (286 lb 6 4 oz)   Height: 5' 5 5" (1 664 m)     Body mass index is 46 93 kg/m²  Review of Systems     Constitutional: Negative for chills, fatigue, fever, headaches, visual disturbances, and unexpected weight change  Respiratory: Negative for cough, & shortness of breath  Cardiovascular: Negative for chest pain       Gastrointestinal: Negative for Abd pain, nausea & vomiting, constipation and diarrhea  Genitourinary: Negative for difficulty urinating, dysuria, hematuria, dyspareunia, unusual vaginal bleeding or discharge  Skin: Negative skin changes    Physical Exam     Constitutional: Alert & Oriented x3, well-developed and well-nourished  No distress  HENT: Atraumatic, Normocephalic, Conjunctivae clear  Neck: Normal range of motion  Neck supple  No thyromegaly, mass, nodules or tenderness  Pulmonary: Effort normal  Lungs clear to ascultation bilateral  Cardiac: RRR, no murmur   Abdominal: Soft  No tenderness or masses  Musculoskeletal: Normal ROM  Skin: Warm & Dry  Psychological: Normal mood, thought content, behavior & judgement     Breasts:   Right: tissue soft without masses, tenderness, skin changes or nipple discharge  No areas of erythema or pain  No subclavicular, axillary, pectoral adenopathy  Left:  tissue soft without masses, tenderness, skin changes or nipple discharge  No areas of erythema or pain  No subclavicular, axillary, pectoral adenopathy    Pelvic exam was performed with patient supine, lithotomy position  Labia: Negative rash, tenderness, lesion or injury on the right labia  Negative rash, tenderness, lesion or injury on the left labia  Urethral meatus:  Negative for  tenderness, inflammation or discharge  Uterus: not deviated, enlarged, fixed or tender  Cervix: No CMT, no discharge or friability   IUD sitting at cervical O's, discussed removal with patient due to malposition, Dr Bradford Zuñiga at bedside to assist with removal d/T resistance when attempting to remove, Para guard removed with 1 arm not present, unable to locate missing arm  US ordered  Patient consented for surgical removal of left behind arm as well as replacement of Paraguard by KTM  Right adnexa: no mass, no tenderness and no fullness  Left adnexa: no mass, no tenderness and no fullness  Vagina: No erythema, tenderness, masses, or foreign body in the vagina  No signs of injury around the vagina  No unusual vaginal discharge   Perineum without lesions, signs of injury, erythema or swelling  Inguinal Canal:        Right: No inguinal adenopathy or hernia present  Left: No inguinal adenopathy or hernia present  Perineal hygiene reviewed   Weight bearing exercises minium of 150 mins/weekly advised  Kegel exercises recommended  SBE encouraged, ASCCP guidelines reviewed  Condoms encouraged with all sexual activity to prevent STI's  Gardisil vaccines recommended up to age 39  Calcium/ Vit D dietary requirements discussed,   Advised to call with any issues,  all concerns & questions addressed     See provided information in your after visit summary   GC & CT sent for documentation prior to replacement of IUD   Scheduled US   Schedule surgical procedure with Paraguard replacement     F/U Annually and PRN

## 2022-01-03 ENCOUNTER — OFFICE VISIT (OUTPATIENT)
Dept: OBGYN CLINIC | Facility: CLINIC | Age: 32
End: 2022-01-03
Payer: COMMERCIAL

## 2022-01-03 ENCOUNTER — TELEPHONE (OUTPATIENT)
Dept: OBGYN CLINIC | Facility: CLINIC | Age: 32
End: 2022-01-03

## 2022-01-03 VITALS
DIASTOLIC BLOOD PRESSURE: 66 MMHG | WEIGHT: 286.4 LBS | HEIGHT: 66 IN | SYSTOLIC BLOOD PRESSURE: 122 MMHG | BODY MASS INDEX: 46.03 KG/M2

## 2022-01-03 DIAGNOSIS — Z01.419 ENCOUNTER FOR GYNECOLOGICAL EXAMINATION WITHOUT ABNORMAL FINDING: Primary | ICD-10-CM

## 2022-01-03 DIAGNOSIS — T83.32XA MALPOSITIONED INTRAUTERINE DEVICE (IUD), INITIAL ENCOUNTER: ICD-10-CM

## 2022-01-03 DIAGNOSIS — Z11.3 SCREENING FOR STDS (SEXUALLY TRANSMITTED DISEASES): ICD-10-CM

## 2022-01-03 PROCEDURE — 87591 N.GONORRHOEAE DNA AMP PROB: CPT | Performed by: OBSTETRICS & GYNECOLOGY

## 2022-01-03 PROCEDURE — 87491 CHLMYD TRACH DNA AMP PROBE: CPT | Performed by: OBSTETRICS & GYNECOLOGY

## 2022-01-03 PROCEDURE — G0476 HPV COMBO ASSAY CA SCREEN: HCPCS | Performed by: OBSTETRICS & GYNECOLOGY

## 2022-01-03 PROCEDURE — 99202 OFFICE O/P NEW SF 15 MIN: CPT | Performed by: OBSTETRICS & GYNECOLOGY

## 2022-01-03 PROCEDURE — S0610 ANNUAL GYNECOLOGICAL EXAMINA: HCPCS | Performed by: OBSTETRICS & GYNECOLOGY

## 2022-01-03 PROCEDURE — G0145 SCR C/V CYTO,THINLAYER,RESCR: HCPCS | Performed by: OBSTETRICS & GYNECOLOGY

## 2022-01-03 PROCEDURE — 1036F TOBACCO NON-USER: CPT | Performed by: OBSTETRICS & GYNECOLOGY

## 2022-01-03 PROCEDURE — 3008F BODY MASS INDEX DOCD: CPT | Performed by: OBSTETRICS & GYNECOLOGY

## 2022-01-03 NOTE — TELEPHONE ENCOUNTER
----- Message from [address] sent at 1/3/2022 10:53 AM EST -----  Derek authorization
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
## 2022-01-03 NOTE — PROGRESS NOTES
Malpositioned intrauterine device (IUD), initial encounter  Comments: With removal of IUD, one arm broke off in the cervix  unable to retrieve in the office   Plan for [facility]  Hysteroscopy with new IUD placement (Mirena)    Orders:  -     US pelvis complete w transvaginal; Future

## 2022-01-05 ENCOUNTER — OFFICE VISIT (OUTPATIENT)
Dept: BARIATRICS | Facility: CLINIC | Age: 32
End: 2022-01-05

## 2022-01-05 DIAGNOSIS — E66.01 MORBID (SEVERE) OBESITY DUE TO EXCESS CALORIES (HCC): Primary | ICD-10-CM

## 2022-01-05 LAB
C TRACH DNA SPEC QL NAA+PROBE: NEGATIVE
HPV HR 12 DNA CVX QL NAA+PROBE: NEGATIVE
HPV16 DNA CVX QL NAA+PROBE: NEGATIVE
HPV18 DNA CVX QL NAA+PROBE: NEGATIVE
N GONORRHOEA DNA SPEC QL NAA+PROBE: NEGATIVE

## 2022-01-05 PROCEDURE — RECHECK

## 2022-01-06 ENCOUNTER — TELEPHONE (OUTPATIENT)
Dept: OBGYN CLINIC | Age: 32
End: 2022-01-06

## 2022-01-06 NOTE — TELEPHONE ENCOUNTER
Prior auth for paragard  No authorization needed for paragard  Reference #: [id number]  Representative: Claudia GALLEGOS      Covered at 100%  Deductible: 0  Copay: 0    Sent to staff for labeling

## 2022-01-10 LAB
LAB AP GYN PRIMARY INTERPRETATION: NORMAL
Lab: NORMAL
PATH INTERP SPEC-IMP: NORMAL

## 2022-01-13 NOTE — TELEPHONE ENCOUNTER
No device labeled as now pt has OR procedure booked with Dr Husain Stacks to have broken IUD removed and a Mirena inserted in OR under anaesthesia

## 2022-02-08 PROCEDURE — NC001 PR NO CHARGE: Performed by: OBSTETRICS & GYNECOLOGY

## 2022-02-08 NOTE — H&P
H&P Exam - Gynecology   Mary Trammell [address] y o  female MRN: [id number]  Unit/Bed#:  Encounter: [id number]    Assessment/Plan     Malpositioned intrauterine device (IUD), initial encounter  Comments: With removal of IUD, one arm broke off in the cervix  unable to retrieve in the office  Plan for [facility]  Hysteroscopy with new IUD placement (Mirena)    Orders:  -     US pelvis complete w transvaginal; Future        History of Present Illness   HPI:  Mary Trammell is a [address] y o  female who presents with retained IUD  Attempt at office removal was unsuccessful  Plan for [facility]  Hysteroscopy with IUD removal and placement of new IUD  (Mirena)    Review of Systems   Constitutional: Negative for activity change, appetite change, chills, fatigue and fever  HENT: Negative for rhinorrhea, sneezing and sore throat  Eyes: Negative for visual disturbance  Respiratory: Negative for cough, shortness of breath and wheezing  Cardiovascular: Negative for chest pain, palpitations and leg swelling  Gastrointestinal: Negative for abdominal distention, constipation, diarrhea, nausea and vomiting  Genitourinary: Negative for difficulty urinating  Neurological: Negative for syncope and light-headedness  Historical Information   Past Medical History:   Diagnosis Date    Bronchitis     Closed disp fx of styloid process of left radius with routine healing     Last Assessed 2017    Depression     [facility]     Obesity, Class III, BMI 40-49 9 (morbid obesity) ([facility] [address] )     Psychiatric disorder     "mood swings"    [facility] ([facility] )     pt denies- blacksout     Past Surgical History:   Procedure Laterality Date     SECTION      WISDOM TOOTH EXTRACTION      all 4     OB History   No obstetric history on file       Family History   Problem Relation Age of Onset    Hypertension Mother     No Known Problems Father     Obesity Daughter      Social History   Social History Substance and Sexual Activity   Alcohol Use Yes    Comment: rarely     Social History     Substance and Sexual Activity   Drug Use No     Social History     Tobacco Use   Smoking Status Former Smoker    Types: Cigarettes    Quit date:     Years since quittin 1   Smokeless Tobacco Current User       Meds/Allergies   all current active meds have been reviewed  No Known Allergies    Objective   Vitals: Height 5' 5 5" (1 664 m), weight 130 kg (286 lb)  No intake or output data in the 24 hours ending 22 1308    Invasive Devices: Invasive Devices  Report    None                 Physical Exam  Constitutional:       General: She is not in acute distress  Appearance: She is well-developed  She is not diaphoretic  Neck:      Vascular: No JVD  Cardiovascular:      Rate and Rhythm: Normal rate and regular rhythm  Heart sounds: Normal heart sounds  No murmur heard  No friction rub  No gallop  Pulmonary:      Effort: Pulmonary effort is normal  No respiratory distress  Breath sounds: Normal breath sounds  Abdominal:      General: Bowel sounds are normal  There is no distension  Palpations: Abdomen is soft  Tenderness: There is no abdominal tenderness  There is no guarding or rebound  Musculoskeletal:      Cervical back: Neck supple  Right lower leg: No edema  Left lower leg: No edema  Lymphadenopathy:      Cervical: No cervical adenopathy  Neurological:      Mental Status: She is alert and oriented to person, place, and time  Deep Tendon Reflexes: Reflexes are normal and symmetric  Lab Results: I have personally reviewed pertinent reports  Imaging: I have personally reviewed pertinent reports  Pathology, and Other Studies: I have personally reviewed pertinent reports

## 2022-02-08 NOTE — PRE-PROCEDURE INSTRUCTIONS
Pre-Surgery Instructions:   Medication Instructions    hydrOXYzine HCL (ATARAX) 50 mg tablet Normally takes at night  if needed      multivitamin (THERAGRAN) TABS Hold day of surgery      sertraline (ZOLOFT) 100 mg tablet Normally takes at night  My Surgical Experience    The following information was developed to assist you to prepare for your operation  What do I need to do before coming to the hospital?   Arrange for a responsible person to drive you to and from the hospital    Arrange care for your children at home  Children are not allowed in the recovery areas of the hospital   Plan to wear clothing that is easy to put on and take off  If you are having shoulder surgery, wear a shirt that buttons or zippers in the front  Bathing  o Shower the evening before and the morning of your surgery with an antibacterial soap  Please refer to the Pre Op Showering Instructions for Surgery Patients Sheet   o Remove nail polish and all body piercing jewelry  o Do not shave any body part for at least 24 hours before surgery-this includes face, arms, legs and upper body  Food  o Nothing to eat or drink after midnight the night before your surgery  This includes candy and chewing gum  o Exception: If your surgery is after 12:00pm (noon), you may have clear liquids such as 7-Up®, ginger ale, apple or cranberry juice, Jell-O®, water, or clear broth until 8:00 am  o Do not drink milk or juice with pulp on the morning before surgery  o Do not drink alcohol 24 hours before surgery  Medicine  o Follow instructions you received from your surgeon about which medicines you may take on the day of surgery  o If instructed to take medicine on the morning of surgery, take pills with just a small sip of water   Call your prescribing doctor for specific infroamtion on what to do if you take insulin    What should I bring to the hospital?    Bring:  Floydene Amour or a walker, if you have them, for foot or knee surgery   A list of the daily medicines, vitamins, minerals, herbals and nutritional supplements you take  Include the dosages of medicines and the time you take them each day   Glasses, dentures or hearing aids   Minimal clothing; you will be wearing hospital sleepwear   Photo ID; required to verify your identity   If you have a Living Will or Power of , bring a copy of the documents   If you have an ostomy, bring an extra pouch and any supplies you use    Do not bring   Medicines or inhalers   Money, valuables or jewelry    What other information should I know about the day of surgery?  Notify your surgeons if you develop a cold, sore throat, cough, fever, rash or any other illness   Report to the Ambulatory Surgical/Same Day Surgery Unit   You will be instructed to stop at Registration only if you have not been pre-registered   Inform your  fi they do not stay that they will be asked by the staff to leave a phone number where they can be reached   Be available to be reached before surgery  In the event the operating room schedule changes, you may be asked to come in earlier or later than expected    *It is important to tell your doctor and others involved in your health care if you are taking or have been taking any non-prescription drugs, vitamins, minerals, herbals or other nutritional supplements   Any of these may interact with some food or medicines and cause a reaction

## 2022-02-11 ENCOUNTER — HOSPITAL ENCOUNTER (OUTPATIENT)
Dept: NON INVASIVE DIAGNOSTICS | Facility: CLINIC | Age: 32
Discharge: HOME | End: 2022-02-11
Payer: COMMERCIAL

## 2022-02-11 ENCOUNTER — APPOINTMENT (OUTPATIENT)
Dept: LAB | Facility: CLINIC | Age: 32
End: 2022-02-11
Payer: COMMERCIAL

## 2022-02-11 ENCOUNTER — TELEPHONE (OUTPATIENT)
Dept: PSYCHIATRY | Facility: CLINIC | Age: 32
End: 2022-02-11

## 2022-02-11 DIAGNOSIS — Z01.818 PRE-OP TESTING: ICD-10-CM

## 2022-02-11 LAB
ANION GAP SERPL CALCULATED.3IONS-SCNC: 6 MMOL/L (ref 4–13)
ATRIAL RATE: 60 BPM
BUN SERPL-MCNC: 10 MG/DL (ref 5–25)
CALCIUM SERPL-MCNC: 10.2 MG/DL (ref 8.3–10.1)
CHLORIDE SERPL-SCNC: 109 MMOL/L (ref 100–108)
CO2 SERPL-SCNC: 20 MMOL/L (ref 21–32)
CREAT SERPL-MCNC: 0.89 MG/DL (ref 0.6–1.3)
ERYTHROCYTE [DISTWIDTH] IN BLOOD BY AUTOMATED COUNT: 14.6 % (ref 11.6–15.1)
GFR SERPL CREATININE-BSD FRML MDRD: 86 ML/MIN/1.73SQ M
GLUCOSE SERPL-MCNC: 86 MG/DL (ref 65–140)
HCT VFR BLD AUTO: 40.4 % (ref 34.8–46.1)
HGB BLD-MCNC: 12.8 G/DL (ref 11.5–15.4)
MCH RBC QN AUTO: 27.4 PG (ref 26.8–34.3)
MCHC RBC AUTO-ENTMCNC: 31.7 G/DL (ref 31.4–37.4)
MCV RBC AUTO: 87 FL (ref 82–98)
P AXIS: 42 DEGREES
PLATELET # BLD AUTO: 226 THOUSANDS/UL (ref 149–390)
PMV BLD AUTO: 12.1 FL (ref 8.9–12.7)
POTASSIUM SERPL-SCNC: 4.1 MMOL/L (ref 3.5–5.3)
PR INTERVAL: 160 MS
QRS AXIS: 88 DEGREES
QRSD INTERVAL: 84 MS
QT INTERVAL: 384 MS
QTC INTERVAL: 384 MS
RBC # BLD AUTO: 4.67 MILLION/UL (ref 3.81–5.12)
SODIUM SERPL-SCNC: 135 MMOL/L (ref 136–145)
T WAVE AXIS: 62 DEGREES
VENTRICULAR RATE: 60 BPM
WBC # BLD AUTO: 6.97 THOUSAND/UL (ref 4.31–10.16)

## 2022-02-11 PROCEDURE — 93010 ELECTROCARDIOGRAM REPORT: CPT | Performed by: INTERNAL MEDICINE

## 2022-02-11 PROCEDURE — 85027 COMPLETE CBC AUTOMATED: CPT

## 2022-02-11 PROCEDURE — 80048 BASIC METABOLIC PNL TOTAL CA: CPT

## 2022-02-11 PROCEDURE — 36415 COLL VENOUS BLD VENIPUNCTURE: CPT

## 2022-02-11 PROCEDURE — 93005 ELECTROCARDIOGRAM TRACING: CPT | Performed by: OBSTETRICS & GYNECOLOGY

## 2022-02-11 NOTE — TELEPHONE ENCOUNTER
Behavorial Health Outpatient Intake Questions    Referred by: Self Referral    Please advised interviewee that they need to answer all questions truthfully to allow for best care and any misrepresentations of information may affect their ability to be seen at this clinic   => Was this discussed? Yes     [facility] -     Presenting Problem (in patient's words):   Stress, anxiety     Are there any developmental disabilities? ? If yes, can they speak to you on the phone? If they are too limited to speak to you on phone, refer out No    Are you taking any psychiatric medications? Yes    => If yes, who prescribes? If yes, are they injectable medications?     sertraline (ZOLOFT) - PCP      Does the patient have a language barrier or hearing impairment? No    [facility] by a therapist or a doctor in the past? If yes, who? No    Has the patient been hospitalized for mental health? No   If yes, how long ago was last hospitalization and where was it? Do you actively use alcohol or marijuana or illegal substances? If yes, what and how much - refer out to Drug and alcohol treatment if use is excessive or daily use of illegal substances No concerns of substance abuse are reported. Do you have a community treatment team or ? No    Legal History-     Does the patient have any history of arrests, halfway/[facility] time, or DUIs? No  If Yes-  1) What types of charges? 2) When were they last incarcerated? 3) Are they currently on parole or probation? Minor Child-    Who has custody of the child? Is there a custody agreement? If there is a custody agreement remind parent that they must bring a copy to the first appt or they will not be seen.      Intake Team, please check with provider before scheduling if flags come up such as:  - complex case  - legal history (other than DUI)  - communication barrier concerns are present  - if, in your judgment, this needs further review    ACCEPTED as a patient Yes  => Appointment Date: 02/14/2022 at 9:00 a.m with Ren Camera     Referred Elsewhere? No    Name of Insurance Co: [address] [place] [address] ID# [id number]  Insurance Phone #  If ins is primary or secondary  If patient is a minor, parents information such as Name, D. O.B of guarantor.

## 2022-02-14 ENCOUNTER — ANESTHESIA EVENT (OUTPATIENT)
Dept: PERIOP | Facility: HOSPITAL | Age: 32
End: 2022-02-14
Payer: COMMERCIAL

## 2022-02-14 ENCOUNTER — TELEMEDICINE (OUTPATIENT)
Dept: BEHAVIORAL/MENTAL HEALTH CLINIC | Facility: CLINIC | Age: 32
End: 2022-02-14
Payer: COMMERCIAL

## 2022-02-14 DIAGNOSIS — F33.1 DEPRESSION, MAJOR, RECURRENT, MODERATE (HCC): Primary | ICD-10-CM

## 2022-02-14 PROCEDURE — 90791 PSYCH DIAGNOSTIC EVALUATION: CPT | Performed by: COUNSELOR

## 2022-02-14 NOTE — BH TREATMENT PLAN
Jerel Dain  1990       Date of Initial Treatment Plan: 2/14/22  Date of Current Treatment Plan: 02/14/22    Treatment Plan Number 1     Strengths/Personal Resources for Self Care: Good communication, good mother    Diagnosis:   1  Depression, major, recurrent, moderate (HCC)         Area of Needs: Stress management, increase motivation, decrease depression      Long Term Goal 1: Develop healthy cognitive patterns and beliefs of self and the world that lead to alleviation and help prevent the relapse of depression symptoms  Target Date: 7/14/22  Completion Date: 8/14/22         Short Term Objectives for Goal 1: 1  Identify and verbalize triggers to depression symptoms  2  Utilize behavioral strategies to increase motivation  3  Process feelings regarding situations that are out of one's control and verbalize acceptance of them  Long Term Goal 2: Establish an inward sense of self worth, confidence and competence    Target Date: 7/14/22  Completion Date: 8/14/22    Short Term Objectives for Goal 2: 1  Learn and implement assertiveness skills  2 Identify positive statement of self, acknowledges compliments of others      GOAL 1: Modality: Individual 2x per month   Completion Date 8/14/22 and The person(s) responsible for carrying out the plan is  Armond Roberson    GOAL 2: Modality: Individual 2x per month   Completion Date 8/14/22 and The person(s) responsible for carrying out the plan is  Armond Urban 9: Diagnosis and Treatment Plan explained to Alicia Lawrence relates understanding diagnosis and is agreeable to Treatment Plan  Armond Portillo gave verbal consent of the completion of the treatment plan via telehealth due to [address] social distancing

## 2022-02-14 NOTE — PSYCH
Assessment/Plan:      There are no diagnoses linked to this encounter  Subjective:      Patient ID: Joi Shepherd is a 32 y o  female  HPI:     Pre-morbid level of function and History of Present Illness: Thomas Galvan reports that she is a single mother with two daughters  Father of her children is inconsistent and "all over the place " Has been out of her home since October  He is not seeing his kids at all and not paying any support  She reports daily tiredness and overly emotional strain  She reports difficulty with motivation at this time to complete daily tasks  She report some benefit in the current medication  Previous Psychiatric/psychological treatment/year: no MM history  Was seeing a therapist while living in [place]  Was seeing him monthly for about two years  Current Psychiatrist/Therapist: Will begin seeing this therapist for bi weekly therapy  Outpatient and/or Partial and Other Community Resources Used (CTT, ICM, VNA): none      Problem Assessment:     SOCIAL/VOCATION:  Family Constellation (include parents, relationship with each and pertinent Psych/Medical History):     Family History   Problem Relation Age of Onset    Hypertension Mother     No Known Problems Father     Obesity Daughter        Mother: Good relationship and very supportive    Spouse: none currently   Father: Good relation and very supportive  Children: Twin 9year old  Siblin siblings  Good relationship with siblings and nieces and nephew  Other:  Described family as good support and one close friends    Thomas Galvna relates best to mom  she lives with daughters  she does not live alone  Domestic Violence: none reported    Additional Comments related to family/relationships/peer support:  Parents live next door and are willing and able to help out as much as possible        School or Work History (strengths/limitations/needs): 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   Cleaned at hotels in the past     Her highest grade level achieved was high school diploma     history includes none    LEISURE ASSESSMENT (Include past and present hobbies/interests and level of involvement (Ex: Group/Club Affiliations): Shilpa Lai enjoys listening to music, coloring on her phone, playing games on phone and cooking  She enjoys spending time with family and her children   her primary language is [place]  Preferred language is [place]    Religions affiliations and level of involvement is every Sunday to [facility]   Does spirituality help you cope? Yes     FUNCTIONAL STATUS: There has been a recent change in Shilpa Lai ability to do the following: does not need can service    Level of Assistance Needed/By Whom?: n/a    Shilpa Lai learns best by  reading, listening, demonstration and picture    SUBSTANCE ABUSE ASSESSMENT: no substance abuse    HEALTH ASSESSMENT: no referral to PCP needed    LEGAL: No Mental Health Advance Directive or Power of  on file    Risk Assessment:   The following ratings are based on my review of records    Risk of Harm to Self:   Demographic risk factors include never  or  status  Historical Risk Factors include none reported  Recent Specific Risk Factors include diagnosis of depression   Additional Factors for a Child or Adolescent gender: female (more likely to attempt) and breaking up with boyfriend or girlfriend    Risk of Harm to Others:   Demographic Risk Factors include none reported  Historical Risk Factors include none reported  Recent Specific Risk Factors include multiple stressors    Access to Weapons:   Shilpa Lai has access to the following weapons: gun   The following steps have been taken to ensure weapons are properly secured: in a gun safe    Based on the above information, the client presents the following risk of harm to self or others:  low    The following interventions are recommended:   no intervention changes    Notes regarding this Risk Assessment: Shilpa Lai presents with a low risk of suicide  She denies any current SI/HI, plan or intent  She reports protective factors in her children and family  She denies any past criminal history or substance abuse  Review Of Systems:     Mood Depression   Behavior Normal    Thought Content Normal   General Relationship Problems, Emotional Problems and Sleep Disturbances   Personality Normal   Other Psych Symptoms Normal   Constitutional Normal   ENT Normal   Cardiovascular Normal    Respiratory Normal    Gastrointestinal Normal   Genitourinary Normal    Musculoskeletal Negative   Integumentary Normal    Neurological Normal    Endocrine Normal          Mental status:  Appearance calm and cooperative , adequate hygiene and grooming and good eye contact    Mood mood appropriate   Affect affect appropriate    Speech a normal rate   Thought Processes normal thought processes   Hallucinations no hallucinations present    Thought Content no delusions   Abnormal Thoughts no suicidal thoughts  and no homicidal thoughts    Orientation  oriented to person and place and time   Remote Memory short term memory intact and long term memory intact   Attention Span concentration intact   Intellect Appears to be of Average Intelligence   Fund of Knowledge displays adequate knowledge of current events   Insight Insight intact   Judgement judgment was intact   Muscle Strength Muscle strength and tone were normal   Language no difficulty naming common objects   Pain none   Pain Scale 0       Virtual Regular Visit    Verification of patient location:    Patient is located in the following state in which I hold an active license [place]      Assessment/Plan:    Problem List Items Addressed This Visit        Other    Depression, major, recurrent, moderate ([facility] ) - Primary          Goals addressed in session: Goal 1 and Goal 2          Reason for visit is No chief complaint on file         Encounter provider Adalgisa Alonso    Provider located at [facility]APIST [facility] [place] [address] [phone]      Recent Visits  No visits were found meeting these conditions  Showing recent visits within past 7 days and meeting all other requirements  Today's Visits  Date Type Provider Dept   22 Telemedicine [facility]   Showing today's visits and meeting all other requirements  Future Appointments  No visits were found meeting these conditions  Showing future appointments within next 150 days and meeting all other requirements       The patient was identified by name and date of birth  Ilir Costa was informed that this is a telemedicine visit and that the visit is being conducted through[employer] Embedded and patient was informed this is a secure, HIPAA-complaint platform  She agrees to proceed  My office door was closed  No one else was in the room  She acknowledged consent and understanding of privacy and security of the video platform  The patient has agreed to participate and understands they can discontinue the visit at any time  Patient is aware this is a billable service       Subjective  Ilir Costa is a 32 y o  female      HPI     Past Medical History:   Diagnosis Date    Bronchitis     Closed disp fx of styloid process of left radius with routine healing     Last Assessed 2017    Depression     HIV (human immunodeficiency virus infection) ([facility] )     Obesity, Class III, BMI 40-49 9 (morbid obesity) ([facility] )     Psychiatric disorder     "mood swings"    Seizure ([facility] )     pt denies- blacksout       Past Surgical History:   Procedure Laterality Date     SECTION      WISDOM TOOTH EXTRACTION      all 4       Current Outpatient Medications   Medication Sig Dispense Refill    hydrOXYzine HCL (ATARAX) 50 mg tablet TAKE 1 TABLET (50 MG TOTAL) BY MOUTH DAILY AT BEDTIME AS NEEDED FOR ANXIETY 30 tablet 0    multivitamin (THERAGRAN) TABS Take 1 tablet by mouth daily      sertraline (ZOLOFT) 100 mg tablet Take 1 tablet (100 mg total) by mouth daily at bedtime 90 tablet 3     No current facility-administered medications for this visit  No Known Allergies    Review of Systems    Video Exam    There were no vitals filed for this visit  Physical Exam     I spent 45 minutes directly with the patient during this visit    Arlin Sj Augustin [address] verbally agrees to participate in [employer]  Pt is aware that [employer] could be limited without vital signs or the ability to perform a full hands-on physical Will Caballero understands she or the provider may request at any time to terminate the video visit and request the patient to seek care or treatment in person

## 2022-02-15 ENCOUNTER — HOSPITAL ENCOUNTER (OUTPATIENT)
Facility: HOSPITAL | Age: 32
Setting detail: OUTPATIENT SURGERY
Discharge: HOME | End: 2022-02-15
Attending: OBSTETRICS & GYNECOLOGY | Admitting: OBSTETRICS & GYNECOLOGY
Payer: COMMERCIAL

## 2022-02-15 ENCOUNTER — ANESTHESIA (OUTPATIENT)
Dept: PERIOP | Facility: HOSPITAL | Age: 32
End: 2022-02-15
Payer: COMMERCIAL

## 2022-02-15 VITALS
TEMPERATURE: 97.9 F | HEIGHT: 65 IN | HEART RATE: 60 BPM | RESPIRATION RATE: 17 BRPM | OXYGEN SATURATION: 98 % | DIASTOLIC BLOOD PRESSURE: 76 MMHG | SYSTOLIC BLOOD PRESSURE: 141 MMHG | WEIGHT: 279.32 LBS | BODY MASS INDEX: 46.54 KG/M2

## 2022-02-15 DIAGNOSIS — G89.18 POSTOPERATIVE PAIN: ICD-10-CM

## 2022-02-15 DIAGNOSIS — Z30.430 ENCOUNTER FOR IUD INSERTION: Primary | ICD-10-CM

## 2022-02-15 PROBLEM — T83.39XA RETAINED INTRAUTERINE CONTRACEPTIVE DEVICE (IUD): Status: ACTIVE | Noted: 2022-02-15

## 2022-02-15 LAB
EXT PREGNANCY TEST URINE: NEGATIVE
EXT. CONTROL: NORMAL

## 2022-02-15 PROCEDURE — 58558 HYSTEROSCOPY BIOPSY: CPT | Performed by: OBSTETRICS & GYNECOLOGY

## 2022-02-15 PROCEDURE — 81025 URINE PREGNANCY TEST: CPT | Performed by: ANESTHESIOLOGY

## 2022-02-15 PROCEDURE — 58300 INSERT INTRAUTERINE DEVICE: CPT | Performed by: OBSTETRICS & GYNECOLOGY

## 2022-02-15 RX ORDER — SENNOSIDES 8.6 MG
650 CAPSULE ORAL EVERY 8 HOURS PRN
Qty: 30 TABLET | Refills: 0
Start: 2022-02-15

## 2022-02-15 RX ORDER — DEXAMETHASONE SODIUM PHOSPHATE 4 MG/ML
INJECTION, SOLUTION INTRA-ARTICULAR; INTRALESIONAL; INTRAMUSCULAR; INTRAVENOUS; SOFT TISSUE AS NEEDED
Status: DISCONTINUED | OUTPATIENT
Start: 2022-02-15 | End: 2022-02-15

## 2022-02-15 RX ORDER — OXYCODONE HYDROCHLORIDE AND ACETAMINOPHEN 5; 325 MG/1; MG/1
1 TABLET ORAL ONCE AS NEEDED
Status: DISCONTINUED | OUTPATIENT
Start: 2022-02-15 | End: 2022-02-15 | Stop reason: HOSPADM

## 2022-02-15 RX ORDER — PROPOFOL 10 MG/ML
INJECTION, EMULSION INTRAVENOUS AS NEEDED
Status: DISCONTINUED | OUTPATIENT
Start: 2022-02-15 | End: 2022-02-15

## 2022-02-15 RX ORDER — HYDROMORPHONE HCL/PF 1 MG/ML
0.5 SYRINGE (ML) INJECTION
Status: CANCELLED | OUTPATIENT
Start: 2022-02-15

## 2022-02-15 RX ORDER — FENTANYL CITRATE 50 UG/ML
INJECTION, SOLUTION INTRAMUSCULAR; INTRAVENOUS AS NEEDED
Status: DISCONTINUED | OUTPATIENT
Start: 2022-02-15 | End: 2022-02-15

## 2022-02-15 RX ORDER — MAGNESIUM HYDROXIDE 1200 MG/15ML
LIQUID ORAL AS NEEDED
Status: DISCONTINUED | OUTPATIENT
Start: 2022-02-15 | End: 2022-02-15 | Stop reason: HOSPADM

## 2022-02-15 RX ORDER — LIDOCAINE HYDROCHLORIDE AND EPINEPHRINE 10; 10 MG/ML; UG/ML
INJECTION, SOLUTION INFILTRATION; PERINEURAL AS NEEDED
Status: DISCONTINUED | OUTPATIENT
Start: 2022-02-15 | End: 2022-02-15 | Stop reason: HOSPADM

## 2022-02-15 RX ORDER — IBUPROFEN 600 MG/1
600 TABLET ORAL EVERY 6 HOURS PRN
Status: CANCELLED | OUTPATIENT
Start: 2022-02-15

## 2022-02-15 RX ORDER — ONDANSETRON 2 MG/ML
INJECTION INTRAMUSCULAR; INTRAVENOUS AS NEEDED
Status: DISCONTINUED | OUTPATIENT
Start: 2022-02-15 | End: 2022-02-15

## 2022-02-15 RX ORDER — SODIUM CHLORIDE, SODIUM LACTATE, POTASSIUM CHLORIDE, CALCIUM CHLORIDE 600; 310; 30; 20 MG/100ML; MG/100ML; MG/100ML; MG/100ML
INJECTION, SOLUTION INTRAVENOUS CONTINUOUS PRN
Status: DISCONTINUED | OUTPATIENT
Start: 2022-02-15 | End: 2022-02-15

## 2022-02-15 RX ORDER — LIDOCAINE HYDROCHLORIDE 10 MG/ML
INJECTION, SOLUTION EPIDURAL; INFILTRATION; INTRACAUDAL; PERINEURAL AS NEEDED
Status: DISCONTINUED | OUTPATIENT
Start: 2022-02-15 | End: 2022-02-15

## 2022-02-15 RX ORDER — MIDAZOLAM HYDROCHLORIDE 2 MG/2ML
INJECTION, SOLUTION INTRAMUSCULAR; INTRAVENOUS AS NEEDED
Status: DISCONTINUED | OUTPATIENT
Start: 2022-02-15 | End: 2022-02-15

## 2022-02-15 RX ORDER — FENTANYL CITRATE/PF 50 MCG/ML
50 SYRINGE (ML) INJECTION
Status: DISCONTINUED | OUTPATIENT
Start: 2022-02-15 | End: 2022-02-15 | Stop reason: HOSPADM

## 2022-02-15 RX ORDER — OXYCODONE HYDROCHLORIDE 5 MG/1
5 TABLET ORAL EVERY 4 HOURS PRN
Status: CANCELLED | OUTPATIENT
Start: 2022-02-15

## 2022-02-15 RX ORDER — IBUPROFEN 600 MG/1
600 TABLET ORAL EVERY 6 HOURS PRN
Qty: 30 TABLET | Refills: 0
Start: 2022-02-15 | End: 2022-07-06

## 2022-02-15 RX ORDER — SODIUM CHLORIDE, SODIUM LACTATE, POTASSIUM CHLORIDE, CALCIUM CHLORIDE 600; 310; 30; 20 MG/100ML; MG/100ML; MG/100ML; MG/100ML
125 INJECTION, SOLUTION INTRAVENOUS CONTINUOUS
Status: DISCONTINUED | OUTPATIENT
Start: 2022-02-15 | End: 2022-02-15 | Stop reason: HOSPADM

## 2022-02-15 RX ORDER — ONDANSETRON 2 MG/ML
4 INJECTION INTRAMUSCULAR; INTRAVENOUS ONCE AS NEEDED
Status: COMPLETED | OUTPATIENT
Start: 2022-02-15 | End: 2022-02-15

## 2022-02-15 RX ADMIN — MIDAZOLAM HYDROCHLORIDE 2 MG: 1 INJECTION, SOLUTION INTRAMUSCULAR; INTRAVENOUS at 09:29

## 2022-02-15 RX ADMIN — FENTANYL CITRATE 50 MCG: 50 INJECTION, SOLUTION INTRAMUSCULAR; INTRAVENOUS at 09:50

## 2022-02-15 RX ADMIN — PROPOFOL 300 MG: 10 INJECTION, EMULSION INTRAVENOUS at 09:35

## 2022-02-15 RX ADMIN — SODIUM CHLORIDE, SODIUM LACTATE, POTASSIUM CHLORIDE, AND CALCIUM CHLORIDE: .6; .31; .03; .02 INJECTION, SOLUTION INTRAVENOUS at 09:33

## 2022-02-15 RX ADMIN — FENTANYL CITRATE 50 MCG: 50 INJECTION, SOLUTION INTRAMUSCULAR; INTRAVENOUS at 09:35

## 2022-02-15 RX ADMIN — ONDANSETRON 4 MG: 2 INJECTION INTRAMUSCULAR; INTRAVENOUS at 09:38

## 2022-02-15 RX ADMIN — DEXAMETHASONE SODIUM PHOSPHATE 4 MG: 4 INJECTION, SOLUTION INTRAMUSCULAR; INTRAVENOUS at 09:38

## 2022-02-15 RX ADMIN — SODIUM CHLORIDE, SODIUM LACTATE, POTASSIUM CHLORIDE, AND CALCIUM CHLORIDE 125 ML/HR: .6; .31; .03; .02 INJECTION, SOLUTION INTRAVENOUS at 08:19

## 2022-02-15 RX ADMIN — ONDANSETRON 4 MG: 2 INJECTION INTRAMUSCULAR; INTRAVENOUS at 10:25

## 2022-02-15 RX ADMIN — LIDOCAINE HYDROCHLORIDE 50 MG: 10 INJECTION, SOLUTION EPIDURAL; INFILTRATION; INTRACAUDAL; PERINEURAL at 09:35

## 2022-02-15 NOTE — ANESTHESIA POSTPROCEDURE EVALUATION
Post-Op Assessment Note    CV Status:  Stable    Pain management: adequate     Mental Status:  Anxious   Hydration Status:  Euvolemic   PONV Controlled:  Controlled   Airway Patency:  Patent      Post Op Vitals Reviewed: Yes      Staff: CRNA         No complications documented      BP   169/81   Temp      Pulse  87   Resp   20   SpO2   99

## 2022-02-15 NOTE — ANESTHESIA PREPROCEDURE EVALUATION
Procedure:  DILATATION AND CURETTAGE (D&C) WITH HYSTEROSCOPY REMOVAL OF RETAINED IUD ARM (N/A Uterus)  INSERTION OF IUD MIRENA (N/A Uterus)    Relevant Problems   NEURO/PSYCH   (+) Depression, major, recurrent, moderate (HCC)        Physical Exam    Airway    Mallampati score: II  TM Distance: >3 FB  Neck ROM: full     Dental   No notable dental hx     Cardiovascular  Rhythm: regular, Rate: normal, No murmur, Cardiovascular exam normal    Pulmonary  Pulmonary exam normal Breath sounds clear to auscultation, No wheezes,     Other Findings        Anesthesia Plan  ASA Score- 3     Anesthesia Type- general with ASA Monitors  Additional Monitors:   Airway Plan: LMA  Plan Factors-Exercise tolerance (METS): >4 METS  Chart reviewed  EKG reviewed  Existing labs reviewed  Patient is a current smoker  Patient instructed to abstain from smoking on day of procedure  Patient did not smoke on day of surgery  There is medical exclusion for perioperative obstructive sleep apnea risk education  Induction- intravenous  Postoperative Plan- Plan for postoperative opioid use  Informed Consent- Anesthetic plan and risks discussed with patient  I personally reviewed this patient with the CRNA  Discussed and agreed on the Anesthesia Plan with the CRNA Gerhardt Sep

## 2022-02-15 NOTE — DISCHARGE INSTRUCTIONS
Intrauterine Device   WHAT YOU NEED TO KNOW:   What is an intrauterine device (IUD)? An IUD is a type of birth control that is inserted into your uterus  It is a small, flexible piece of plastic with a string on the end  It is inserted and removed by your healthcare provider  IUDs prevent sperm from reaching or fertilizing an egg  IUDs also prevent a fertilized egg from attaching to the uterus and developing into a fetus  What are the most common types of IUDs? Your healthcare provider will recommend the type of IUD that is right for you  This is based on your age and if you have had a child  If you have not had a child, a smaller IUD will be used  · A copper IUD  slowly releases a small amount of copper into your uterus  This IUD can remain in place for up to 10 years  · A hormone-releasing IUD  slowly releases a small amount of progesterone into your uterus  Progesterone is a hormone that is made by your body to help control your periods  This IUD can remain in place for 3 to 5 years  What are the advantages of an IUD? · An IUD is 98% to 99% effective in preventing pregnancy  · The IUD can be removed by your healthcare provider if you decide to have a baby  You may be able to get pregnant as soon as the IUD is removed  · An IUD protects you from pregnancy right after it is inserted  · You do not have to stop sexual activity to insert it  You do not have to remember to take your birth control pill  · Copper IUDs are safer for some women than oral birth control pills  Examples include women who smoke or have a history of blood clots  · Hormone-releasing IUDs may decrease certain health problems  Examples include bleeding and cramping that happen with your monthly period  What are the disadvantages of an IUD? · There is a small chance that you could get pregnant  Sometimes the IUD cannot be removed after you get pregnant   This increases your risk of a miscarriage or an ectopic pregnancy  Ectopic pregnancy is when the fertilized egg starts to grow somewhere other than your uterus  · An IUD does not protect you from sexually transmitted infections  · You may have cramps during the first weeks after you get the IUD  · A copper IUD may cause your monthly period to be heavier or more painful  This is more common within the first 3 months after you get the IUD  You may need to have your IUD removed if your bleeding or pain becomes severe  You may have spotting between periods  · There is a small risk of an infection within the first 20 days after the IUD is placed  Infection can lead to pelvic inflammatory disease  This can cause infertility  · Your uterus may tear when the IUD is inserted  The IUD may slip part or all of the way out of your uterus  How is the IUD inserted? · The IUD is usually inserted during your monthly period  This may help decrease the amount of discomfort you have during the procedure  It also helps make sure that you are not pregnant  You will be asked to lie down and place your feet in stirrups  Your healthcare provider will gently insert a speculum into your vagina  This is the same tool used during a Pap smear  The speculum allows your healthcare provider to see inside your vagina to your cervix  The cervix is the opening of your uterus  · Your healthcare provider will clean your cervix with an antiseptic solution to prevent infection  You may be given numbing medicine  A long plastic tube is gently passed through your cervix and into your uterus  This tube has the IUD inside of it  The IUD is pushed out of the tube and into your uterus  You may have cramps as the IUD is inserted  The tube is removed after the IUD is in place  How can I make sure my IUD is still in place? An IUD has a string made of plastic thread  One to 2 inches of this string hangs into your vagina   You cannot see this string, and it should not cause problems when you have sex  Check your IUD string every 3 days for the first 3 months after it is inserted  After that, check the string after each monthly period  Do the following to check the placement of your IUD:  · Wash your hands with soap and warm water  Dry them with a clean towel  · Bend your knees and squat low to the ground  · Gently put your index finger inside your vagina  The cervix is at the top of the vagina and feels like the tip of your nose  Feel for the IUD string  Do not pull on the string  You should not be able to feel the firm plastic of the IUD itself  · Wash your hands after you check your IUD string  Where can I find more information? · [facility] [address] , [address] [place]  Phone: [phone]  Web Address: https://[employer]    When should I seek immediate care? · You have severe pain or bleeding during your period  · You have a fever and severe abdominal pain  When should I call my doctor? · You think you are pregnant  · The IUD has come out  · You have bleeding from your vagina after you have sex, and it is not your period  · You have pain during sex  · You cannot feel the IUD string, the string feels longer, or you feel the plastic of the IUD itself  · You have vaginal discharge that is green, yellow, or has a foul odor  · You have questions or concerns about your condition or care  CARE AGREEMENT:   You have the right to help plan your care  Learn about your health condition and how it may be treated  Discuss treatment options with your healthcare providers to decide what care you want to receive  You always have the right to refuse treatment  The above information is an  only  It is not intended as medical advice for individual conditions or treatments  Talk to your doctor, nurse or pharmacist before following any medical regimen to see if it is safe and effective for you    © [address] [place] [address] 2021 Information is for End User's use only and may not be sold, redistributed or otherwise used for commercial purposes  All illustrations and images included in CareNotes® are the copyrighted property of A D A M [employer] Inc  or [employer]acy [employer]    Dilation and Curettage   WHAT YOU SHOULD KNOW:   Dilation and curettage (D and C) is a procedure to remove tissue from the lining of your uterus  INSTRUCTIONS:   Medicines:   · Pain medicine: You may be given medicine to take away or decrease pain  Do not wait until the pain is severe before you take your medicine  · Antibiotics: This medicine will help fight or prevent an infection  · Take your medicine as directed  Call your healthcare provider if you think your medicine is not helping or if you have side effects  Tell him if you are allergic to any medicine  Keep a list of the medicines, vitamins, and herbs you take  Include the amounts, and when and why you take them  Bring the list or the pill bottles to follow-up visits  Carry your medicine list with you in case of an emergency  Follow up with your healthcare provider as directed:  Write down your questions so you remember to ask them during your visits  Activity:  Ask your primary healthcare provider when you can return to your normal activities  Contact your primary healthcare provider if:   · You have foul-smelling vaginal discharge  · You do not get your monthly period  · You feel depressed or anxious  · You feel very tired and weak  · You have questions or concerns about your condition or care  Return to the emergency department if:   · You have heavy vaginal bleeding that soaks 1 pad in 1 hour for 2 hours in a row  · You have a fever and abdominal cramps  · Your pain does not get better, even after treatment    © 2014 [address] is for End User's use only and may not be sold, redistributed or otherwise used for commercial purposes  All illustrations and images included in CareNotes® are the copyrighted property of A D A M , Inc  or Hernandez Chan  The above information is an  only  It is not intended as medical advice for individual conditions or treatments  Talk to your doctor, nurse or pharmacist before following any medical regimen to see if it is safe and effective for you

## 2022-02-15 NOTE — INTERVAL H&P NOTE
H&P reviewed  After examining the patient I find no changes in the patients condition since the H&P had been written      Vitals:    02/15/22 0801   BP: 129/76   Pulse: 63   Resp: 17   Temp: 97 8 °F (36 6 °C)   SpO2: 100%

## 2022-02-15 NOTE — OP NOTE
PERATIVE REPORT  PATIENT NAME: Cuco Montaño    :  1990  MRN: [id number]  Pt Location: [facility] ROOM 04    SURGERY DATE: 2/15/2022    Surgeon(s) and Role:     * Freda Morrison MD - Primary    Preop Diagnosis:  Retained intrauterine contraceptive device (IUD) [facility]    Post-Op Diagnosis Codes:     * Retained intrauterine contraceptive device (IUD) [facility]     * Encounter for IUD insertion [Z30 430]    Procedure(s) (LRB):  DILATATION AND CURETTAGE (D&C) WITH HYSTEROSCOPY REMOVAL OF RETAINED IUD ARM (N/A)  INSERTION OF IUD MIRENA (N/A)    Specimen(s):  * No specimens in log *    Estimated Blood Loss:   Minimal    Drains:  * No LDAs found *    Anesthesia Type:   General    Operative Indications:  Retained intrauterine contraceptive device (IUD) [T83 39XA]  Insertion of IUD    Operative Findings:  1  Normal appearing cervix  2  Endometrium smooth  No portion of IUD seen throughout endometrium nor cervix  endometriuam currettings obtained and additional views of endometrium/cervical canal taken with no evidence of iUD arm noted  Complications:   None    Procedure and Technique:  Patient was identified in the holding area and brought back to the operating room where general anesthesia was initiated  Bilateral lower extremity compression boots were placed prior to initiation of anesthesia  Patient was placed in the dorsal lithotomy position and prepped and draped in a sterile fashion  Timeout procedure was completed  Exam under anesthesia was completed and above findings noted  A weighted speculum and lianet were placed into the vagina for visualization of the cervix  A single toothed tenaculum was used to grasp the anterior lip of the cervix  Paracervical block was placed  The uterus was sounded and the cervix was dilated to accommodate the insertion of the hysteroscope  The hysteroscope was inserted and using normal saline as the distension fluid the endometrium was visualized   The above findings were noted  The hysteroscope was removed and the endometrial currettings were obtained  No evidence of IUD arm in currenttings  Additional insertion of hysteroscope for view of lining  No IUD arm visualized  Cavity normal in appearance  The Mirena insertion device was inserted to sound of 8cm  Mirena inserted with out difficulty  Strings trimmed  All instruments were removed from the vagina and good hemostasis was noted  The patient was extubated and brought the the recovery room in stable condition  All sponge, lap and needle counts were correct        I was present for the entire procedure, I was present for all critical portions of the procedure and A qualified resident physician was not available    Patient Disposition:  PACU       SIGNATURE: Andrea Jaimes MD  DATE: February 15, 2022  TIME: 10:03 AM
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
## 2022-02-23 ENCOUNTER — OFFICE VISIT (OUTPATIENT)
Dept: BARIATRICS | Facility: CLINIC | Age: 32
End: 2022-02-23

## 2022-02-23 VITALS — WEIGHT: 278.4 LBS | BODY MASS INDEX: 46.33 KG/M2

## 2022-02-23 DIAGNOSIS — E66.01 OBESITY, CLASS III, BMI 40-49.9 (MORBID OBESITY) (HCC): Primary | ICD-10-CM

## 2022-02-23 PROCEDURE — RECHECK

## 2022-02-28 ENCOUNTER — HOSPITAL ENCOUNTER (OUTPATIENT)
Dept: ULTRASOUND IMAGING | Facility: HOSPITAL | Age: 32
Discharge: HOME | End: 2022-02-28
Payer: COMMERCIAL

## 2022-02-28 DIAGNOSIS — T83.32XA MALPOSITIONED INTRAUTERINE DEVICE (IUD), INITIAL ENCOUNTER: ICD-10-CM

## 2022-02-28 PROCEDURE — 76830 TRANSVAGINAL US NON-OB: CPT

## 2022-02-28 PROCEDURE — 76856 US EXAM PELVIC COMPLETE: CPT

## 2022-03-01 ENCOUNTER — TELEMEDICINE (OUTPATIENT)
Dept: BEHAVIORAL/MENTAL HEALTH CLINIC | Facility: CLINIC | Age: 32
End: 2022-03-01
Payer: COMMERCIAL

## 2022-03-01 DIAGNOSIS — F33.1 DEPRESSION, MAJOR, RECURRENT, MODERATE (HCC): Primary | ICD-10-CM

## 2022-03-01 PROCEDURE — 90834 PSYTX W PT 45 MINUTES: CPT | Performed by: COUNSELOR
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
## 2022-03-04 ENCOUNTER — OFFICE VISIT (OUTPATIENT)
Dept: OBGYN CLINIC | Facility: CLINIC | Age: 32
End: 2022-03-04

## 2022-03-04 VITALS — SYSTOLIC BLOOD PRESSURE: 130 MMHG | DIASTOLIC BLOOD PRESSURE: 80 MMHG | BODY MASS INDEX: 46.36 KG/M2 | WEIGHT: 278.6 LBS

## 2022-03-04 DIAGNOSIS — T83.39XA RETAINED INTRAUTERINE CONTRACEPTIVE DEVICE (IUD): ICD-10-CM

## 2022-03-04 DIAGNOSIS — Z97.5 IUD (INTRAUTERINE DEVICE) IN PLACE: Primary | ICD-10-CM

## 2022-03-04 PROBLEM — Z30.430 ENCOUNTER FOR IUD INSERTION: Status: RESOLVED | Noted: 2022-02-15 | Resolved: 2022-03-04

## 2022-03-04 PROCEDURE — 99024 POSTOP FOLLOW-UP VISIT: CPT | Performed by: OBSTETRICS & GYNECOLOGY

## 2022-03-04 NOTE — PATIENT INSTRUCTIONS
Intrauterine Device   WHAT YOU NEED TO KNOW:   What is an intrauterine device (IUD)? An IUD is a type of birth control that is inserted into your uterus  It is a small, flexible piece of plastic with a string on the end  It is inserted and removed by your healthcare provider  IUDs prevent sperm from reaching or fertilizing an egg  IUDs also prevent a fertilized egg from attaching to the uterus and developing into a fetus  What are the most common types of IUDs? Your healthcare provider will recommend the type of IUD that is right for you  This is based on your age and if you have had a child  If you have not had a child, a smaller IUD will be used  · A copper IUD  slowly releases a small amount of copper into your uterus  This IUD can remain in place for up to 10 years  · A hormone-releasing IUD  slowly releases a small amount of progesterone into your uterus  Progesterone is a hormone that is made by your body to help control your periods  This IUD can remain in place for 3 to 5 years  What are the advantages of an IUD? · An IUD is 98% to 99% effective in preventing pregnancy  · The IUD can be removed by your healthcare provider if you decide to have a baby  You may be able to get pregnant as soon as the IUD is removed  · An IUD protects you from pregnancy right after it is inserted  · You do not have to stop sexual activity to insert it  You do not have to remember to take your birth control pill  · Copper IUDs are safer for some women than oral birth control pills  Examples include women who smoke or have a history of blood clots  · Hormone-releasing IUDs may decrease certain health problems  Examples include bleeding and cramping that happen with your monthly period  What are the disadvantages of an IUD? · There is a small chance that you could get pregnant  Sometimes the IUD cannot be removed after you get pregnant   This increases your risk of a miscarriage or an ectopic pregnancy  Ectopic pregnancy is when the fertilized egg starts to grow somewhere other than your uterus  · An IUD does not protect you from sexually transmitted infections  · You may have cramps during the first weeks after you get the IUD  · A copper IUD may cause your monthly period to be heavier or more painful  This is more common within the first 3 months after you get the IUD  You may need to have your IUD removed if your bleeding or pain becomes severe  You may have spotting between periods  · There is a small risk of an infection within the first 20 days after the IUD is placed  Infection can lead to pelvic inflammatory disease  This can cause infertility  · Your uterus may tear when the IUD is inserted  The IUD may slip part or all of the way out of your uterus  How is the IUD inserted? · The IUD is usually inserted during your monthly period  This may help decrease the amount of discomfort you have during the procedure  It also helps make sure that you are not pregnant  You will be asked to lie down and place your feet in stirrups  Your healthcare provider will gently insert a speculum into your vagina  This is the same tool used during a Pap smear  The speculum allows your healthcare provider to see inside your vagina to your cervix  The cervix is the opening of your uterus  · Your healthcare provider will clean your cervix with an antiseptic solution to prevent infection  You may be given numbing medicine  A long plastic tube is gently passed through your cervix and into your uterus  This tube has the IUD inside of it  The IUD is pushed out of the tube and into your uterus  You may have cramps as the IUD is inserted  The tube is removed after the IUD is in place  How can I make sure my IUD is still in place? An IUD has a string made of plastic thread  One to 2 inches of this string hangs into your vagina   You cannot see this string, and it should not cause problems when you have sex  Check your IUD string every 3 days for the first 3 months after it is inserted  After that, check the string after each monthly period  Do the following to check the placement of your IUD:  · Wash your hands with soap and warm water  Dry them with a clean towel  · Bend your knees and squat low to the ground  · Gently put your index finger inside your vagina  The cervix is at the top of the vagina and feels like the tip of your nose  Feel for the IUD string  Do not pull on the string  You should not be able to feel the firm plastic of the IUD itself  · Wash your hands after you check your IUD string  Where can I find more information? · [facility] [address] , [address] [place]  Phone: 8- [phone] - [phone]  Web Address: https://[employer]    When should I seek immediate care? · You have severe pain or bleeding during your period  · You have a fever and severe abdominal pain  When should I call my doctor? · You think you are pregnant  · The IUD has come out  · You have bleeding from your vagina after you have sex, and it is not your period  · You have pain during sex  · You cannot feel the IUD string, the string feels longer, or you feel the plastic of the IUD itself  · You have vaginal discharge that is green, yellow, or has a foul odor  · You have questions or concerns about your condition or care  CARE AGREEMENT:   You have the right to help plan your care  Learn about your health condition and how it may be treated  Discuss treatment options with your healthcare providers to decide what care you want to receive  You always have the right to refuse treatment  The above information is an  only  It is not intended as medical advice for individual conditions or treatments  Talk to your doctor, nurse or pharmacist before following any medical regimen to see if it is safe and effective for you    © Copyright [employer] 2022 Information is for End User's use only and may not be sold, redistributed or otherwise used for commercial purposes   All illustrations and images included in CareNotes® are the copyrighted property of A D A M , Inc  or [address]

## 2022-03-04 NOTE — PROGRESS NOTES
Assessment/Plan:    Retained intrauterine contraceptive device (IUD)  Ultrasound completed- no evidence of partial portion of old IUD within the cervix       Diagnoses and all orders for this visit:    IUD (intrauterine device) in place  Comments:  Ultrasound reviewed  IUD in place  Plan for follow up annual    Retained intrauterine contraceptive device (IUD)          Subjective:      Patient ID: Adeline Daily is a 32 y o  female  Patient presents for a f/u IUD removal/reinsertion under sedation 2/15/22    Had 4 days of sporadic bleeding  No pain  Ultrasound reviewed  Personally by me  IUD in place  NO evidence of old IUD within the cervical canal/lower uterine segment  Not currently sexually active  Lives at home with 7yo twin girls      The following portions of the patient's history were reviewed and updated as appropriate:   She  has a past medical history of Bronchitis, Closed disp fx of styloid process of left radius with routine healing, Depression, HIV (human immunodeficiency virus infection) ([facility] ), Obesity, Class III, BMI 40-49 9 (morbid obesity) ([facility] ), Psychiatric disorder, and Seizure ([facility] [facility] )  She   Patient Active Problem List    Diagnosis Date Noted    Retained intrauterine contraceptive device (IUD) 02/15/2022    Primary insomnia 2021    Obesity due to excess calories without serious comorbidity 2021    Depression, major, recurrent, moderate ([address] ) 2017    Family history of ischemic heart disease (IHD) 06/10/2011     She  has a past surgical history that includes [place] tooth extraction;  section; pr hysteroscopy,w/endo bx (N/A, 2/15/2022); and pr insert intrauterine device (N/A, 2/15/2022)  Her family history includes Hypertension in her mother; No Known Problems in her father; Obesity in her daughter  She  reports that she quit smoking about 12 years ago  Her smoking use included cigarettes  She uses smokeless tobacco  She reports previous alcohol use  She reports that she does not use drugs  Current Outpatient Medications   Medication Sig Dispense Refill    acetaminophen (TYLENOL) 650 mg CR tablet Take 1 tablet (650 mg total) by mouth every 8 (eight) hours as needed for mild pain 30 tablet 0    hydrOXYzine HCL (ATARAX) 50 mg tablet TAKE 1 TABLET (50 MG TOTAL) BY MOUTH DAILY AT BEDTIME AS NEEDED FOR ANXIETY 30 tablet 0    ibuprofen (MOTRIN) 600 mg tablet Take 1 tablet (600 mg total) by mouth every 6 (six) hours as needed for moderate pain 30 tablet 0    multivitamin (THERAGRAN) TABS Take 1 tablet by mouth daily      sertraline (ZOLOFT) 100 mg tablet Take 1 tablet (100 mg total) by mouth daily at bedtime 90 tablet 3     No current facility-administered medications for this visit  Current Outpatient Medications on File Prior to Visit   Medication Sig    acetaminophen (TYLENOL) 650 mg CR tablet Take 1 tablet (650 mg total) by mouth every 8 (eight) hours as needed for mild pain    hydrOXYzine HCL (ATARAX) 50 mg tablet TAKE 1 TABLET (50 MG TOTAL) BY MOUTH DAILY AT BEDTIME AS NEEDED FOR ANXIETY    ibuprofen (MOTRIN) 600 mg tablet Take 1 tablet (600 mg total) by mouth every 6 (six) hours as needed for moderate pain    multivitamin (THERAGRAN) TABS Take 1 tablet by mouth daily    sertraline (ZOLOFT) 100 mg tablet Take 1 tablet (100 mg total) by mouth daily at bedtime     No current facility-administered medications on file prior to visit  She has No Known Allergies       Review of Systems   Constitutional: Negative for activity change, appetite change, chills, fatigue and fever  HENT: Negative for rhinorrhea, sneezing and sore throat  Eyes: Negative for visual disturbance  Respiratory: Negative for cough, shortness of breath and wheezing  Cardiovascular: Negative for chest pain, palpitations and leg swelling  Gastrointestinal: Negative for abdominal distention, constipation, diarrhea, nausea and vomiting     Genitourinary: Negative for difficulty urinating  Neurological: Negative for syncope and light-headedness  Objective:      /80   Wt 126 kg (278 lb 9 6 oz)   BMI 46 36 kg/m²          Physical Exam  Genitourinary:     Labia:         Right: No rash, tenderness or lesion  Left: No rash, tenderness or lesion  Vagina: Normal  No vaginal discharge, erythema or tenderness  Cervix: No cervical motion tenderness, discharge or friability  Uterus: Not deviated, not enlarged, not fixed and not tender  Adnexa:         Right: No mass, tenderness or fullness  Left: No mass, tenderness or fullness          Comments: IUD string seen

## 2022-03-11 ENCOUNTER — TELEMEDICINE (OUTPATIENT)
Dept: BEHAVIORAL/MENTAL HEALTH CLINIC | Facility: CLINIC | Age: 32
End: 2022-03-11
Payer: COMMERCIAL

## 2022-03-11 DIAGNOSIS — F33.1 DEPRESSION, MAJOR, RECURRENT, MODERATE (HCC): Primary | ICD-10-CM

## 2022-03-11 PROCEDURE — 90834 PSYTX W PT 45 MINUTES: CPT | Performed by: COUNSELOR

## 2022-03-11 NOTE — PSYCH
Virtual Regular Visit    Verification of patient location:    Patient is located in the following state in which I hold an active license [place]      Assessment/Plan:    Problem List Items Addressed This Visit        Other    Depression, major, recurrent, moderate ([address] ) - Primary          Goals addressed in session: Goal 1 and Goal 2          Reason for visit is No chief complaint on file  Encounter provider Valentina Rosen    Provider located at [address] [place] [id number] [phone]      Recent Visits  No visits were found meeting these conditions  Showing recent visits within past 7 days and meeting all other requirements  Future Appointments  No visits were found meeting these conditions  Showing future appointments within next 150 days and meeting all other requirements       The patient was identified by name and date of birth  Solis Roberts was informed that this is a telemedicine visit and that the visit is being conducted throughEpic Embedded and patient was informed this is a secure, HIPAA-complaint platform  She agrees to proceed     My office door was closed  No one else was in the room  She acknowledged consent and understanding of privacy and security of the video platform  The patient has agreed to participate and understands they can discontinue the visit at any time  Patient is aware this is a billable service  Subjective  Solis Roberts is a 32 y o  female     D: Clinician met with Tammy Rebolledo via telehealth for individual therapy Tammy Rebolledo reports continues issues with depressive symptoms including lack of motivation to get out of bed, tiredness, increased sleeping and overall lack of interest in activities  She reports this is typically on week days more than weekends  Clinician explored symptoms and use of coping skills   Tammy Rebolledo reports that some days she does not get out of bed until after 1 pm  Clinician explored use of medication, which Lola Epperson reports taking her Zoloft as prescribed  Clinician encouraged Lola Epperson to discuss current symptoms with PCP and continue working on behavioral strategies to increase mood  A: Lola Epperson was open and engaged in the session  She presented with stable mood  P: Continue to meet with Lola Epperson every other week for individual therapy        HPI     Past Medical History:   Diagnosis Date    Bronchitis     Closed disp fx of styloid process of left radius with routine healing     Last Assessed 2017    Depression     [facility]) [facility] [address] [facility]     Obesity, Class III, BMI 40-49 9 (morbid obesity) ([facility] [address] )     Psychiatric disorder     "mood swings"    [facility]     pt denies- blacksout       Past Surgical History:   Procedure Laterality Date     SECTION      [place] HYSTEROSCOPY,W/ENDO BX N/A 2/15/2022    Procedure: DILATATION AND CURETTAGE (D&C) WITH HYSTEROSCOPY REMOVAL OF RETAINED IUD ARM;  Surgeon: Michael Raman MD;  [facility];  Service: Gynecology    [place] INSERT INTRAUTERINE DEVICE N/A 2/15/2022    Procedure: INSERTION OF IUD MIRENA;  Surgeon: Michael Raman MD;  [facility];  Service: Gynecology    WISDOM TOOTH EXTRACTION      all 4       Current Outpatient Medications   Medication Sig Dispense Refill    acetaminophen (TYLENOL) 650 mg CR tablet Take 1 tablet (650 mg total) by mouth every 8 (eight) hours as needed for mild pain 30 tablet 0    hydrOXYzine HCL (ATARAX) 50 mg tablet TAKE 1 TABLET (50 MG TOTAL) BY MOUTH DAILY AT BEDTIME AS NEEDED FOR ANXIETY 30 tablet 0    ibuprofen (MOTRIN) 600 mg tablet Take 1 tablet (600 mg total) by mouth every 6 (six) hours as needed for moderate pain 30 tablet 0    multivitamin (THERAGRAN) TABS Take 1 tablet by mouth daily      sertraline (ZOLOFT) 100 mg tablet Take 1 tablet (100 mg total) by mouth daily at bedtime 90 tablet 3 No current facility-administered medications for this visit  No Known Allergies    Review of Systems    Video Exam    There were no vitals filed for this visit  Physical Exam     I spent 45 minutes directly with the patient during this visit    Arlin Rhoades 108 verbally agrees to participate in [employer]  Pt is aware that [employer] could be limited without vital signs or the ability to perform a full hands-on physical Wilfred Washington understands she or the provider may request at any time to terminate the video visit and request the patient to seek care or treatment in person
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
## 2022-03-23 ENCOUNTER — OFFICE VISIT (OUTPATIENT)
Dept: BARIATRICS | Facility: CLINIC | Age: 32
End: 2022-03-23

## 2022-03-23 DIAGNOSIS — Z01.818 PREOP TESTING: ICD-10-CM

## 2022-03-23 DIAGNOSIS — E66.01 OBESITY, CLASS III, BMI 40-49.9 (MORBID OBESITY) (HCC): Primary | ICD-10-CM

## 2022-03-23 PROCEDURE — RECHECK

## 2022-03-25 VITALS — BODY MASS INDEX: 46.2 KG/M2 | WEIGHT: 277.6 LBS

## 2022-03-30 ENCOUNTER — TELEPHONE (OUTPATIENT)
Dept: PSYCHIATRY | Facility: CLINIC | Age: 32
End: 2022-03-30

## 2022-03-30 NOTE — TELEPHONE ENCOUNTER
Patient called and left a message requesting if Buck Berger can get in contact   No other information was left

## 2022-04-01 ENCOUNTER — TELEMEDICINE (OUTPATIENT)
Dept: BEHAVIORAL/MENTAL HEALTH CLINIC | Facility: CLINIC | Age: 32
End: 2022-04-01
Payer: COMMERCIAL

## 2022-04-01 ENCOUNTER — TELEPHONE (OUTPATIENT)
Dept: PSYCHIATRY | Facility: CLINIC | Age: 32
End: 2022-04-01

## 2022-04-01 DIAGNOSIS — F33.1 DEPRESSION, MAJOR, RECURRENT, MODERATE (HCC): Primary | ICD-10-CM

## 2022-04-01 PROCEDURE — 90834 PSYTX W PT 45 MINUTES: CPT | Performed by: COUNSELOR

## 2022-04-01 NOTE — TELEPHONE ENCOUNTER
PT called and was looking for np appointment for med mgmt  PT is a pt of Rafa Esqueda  Pt is interested in virtual only   PT has been added to adult psych wait list

## 2022-04-01 NOTE — PSYCH
Virtual Regular Visit    Verification of patient location:    Patient is located in the following state in which I hold an active license [place]      Assessment/Plan:    Problem List Items Addressed This Visit        Other    Depression, major, recurrent, moderate ([address] ) - Primary          Goals addressed in session: Goal 1 and Goal 2          Reason for visit is No chief complaint on file  Encounter provider Justo Toure    Provider located at [address] [place] [id number] [phone]      Recent Visits  Date Type Provider Dept   03/30/22 Telephone [facility] [facility] recent visits within past 7 days and meeting all other requirements  Future Appointments  No visits were found meeting these conditions  Showing future appointments within next 150 days and meeting all other requirements       The patient was identified by name and date of birth  Hay Mosley was informed that this is a telemedicine visit and that the visit is being conducted throughEpic Embedded and patient was informed this is a secure, HIPAA-complaint platform  She agrees to proceed     My office door was closed  No one else was in the room  She acknowledged consent and understanding of privacy and security of the video platform  The patient has agreed to participate and understands they can discontinue the visit at any time  Patient is aware this is a billable service  Subjective  Hay Mosley is a 32 y o  female     D: Clinician met with Consuelo Horne via telehealth for individual therapy  Consuelo Horne reports plan to have bariatric surgery in the future and requires and psych evaluation to move forward  Clinician explained to Consuelo Horne what this would entail and the next steps and possible resources in the community to get this completed  Clinician explored reasons for surgery   Consuelo Horne reports that although she has begun changing her lifestyle in the way of more exercise and eating better she wants to follow through with surgery to lose weight that she has had difficulty losing in the past  She reports that other family members have had a good experience in the past and this has been hopeful for her  Clinician explored pros and cons of surgery and how increased exercise could also improve her mood and decrease depressive symptoms  A: Mónicavarinder Briana was engaged in the session and wiling to meeting with a psychiatrist to complete psych evaluation to complete next steps before surgery  Iris Warren denies employment at this time or intention to be employed in the future  P: Continue to meet with Mónicavarinder Briana every other week for individual therapy        HPI     Past Medical History:   Diagnosis Date    Bronchitis     Closed disp fx of styloid process of left radius with routine healing     Last Assessed 2017    Depression     HIV [facility] infection) ([facility] )     Obesity, Class III, BMI 40-49 9 (morbid obesity) ([facility] )     Psychiatric disorder     "mood swings"    Seizure ([facility] )     pt denies- blacksout       Past Surgical History:   Procedure Laterality Date     SECTION      [place] HYSTEROSCOPY,W/ENDO BX N/A 2/15/2022    Procedure: DILATATION AND CURETTAGE (D&C) WITH HYSTEROSCOPY REMOVAL OF RETAINED IUD ARM;  Surgeon: Silvana Garvey MD;  [facility];  Service: Gynecology    [place] INSERT INTRAUTERINE DEVICE N/A 2/15/2022    Procedure: INSERTION OF IUD MIRENA;  Surgeon: Silvaan Garvey MD;  [facility];  Service: Gynecology    WISDOM TOOTH EXTRACTION      all 4       Current Outpatient Medications   Medication Sig Dispense Refill    acetaminophen (TYLENOL) 650 mg CR tablet Take 1 tablet (650 mg total) by mouth every 8 (eight) hours as needed for mild pain 30 tablet 0    hydrOXYzine HCL (ATARAX) 50 mg tablet TAKE 1 TABLET (50 MG TOTAL) BY MOUTH DAILY AT BEDTIME AS NEEDED FOR ANXIETY 30 tablet 0    ibuprofen (MOTRIN) 600 mg tablet Take 1 tablet (600 mg total) by mouth every 6 (six) hours as needed for moderate pain 30 tablet 0    multivitamin (THERAGRAN) TABS Take 1 tablet by mouth daily      sertraline (ZOLOFT) 100 mg tablet Take 1 tablet (100 mg total) by mouth daily at bedtime 90 tablet 3     No current facility-administered medications for this visit  No Known Allergies    Review of Systems    Video Exam    There were no vitals filed for this visit  Physical Exam     I spent 45 minutes directly with the patient during this visit    Arlin Rhoades 89 verbally agrees to participate in [employer]  Pt is aware that [employer] could be limited without vital signs or the ability to perform a full hands-on physical Carlin Parcel understands she or the provider may request at any time to terminate the video visit and request the patient to seek care or treatment in person

## 2022-04-13 ENCOUNTER — TELEPHONE (OUTPATIENT)
Dept: PSYCHIATRY | Facility: CLINIC | Age: 32
End: 2022-04-13

## 2022-04-21 ENCOUNTER — TELEMEDICINE (OUTPATIENT)
Dept: BEHAVIORAL/MENTAL HEALTH CLINIC | Facility: CLINIC | Age: 32
End: 2022-04-21
Payer: COMMERCIAL

## 2022-04-21 DIAGNOSIS — F33.1 DEPRESSION, MAJOR, RECURRENT, MODERATE (HCC): Primary | ICD-10-CM

## 2022-04-21 PROCEDURE — 90834 PSYTX W PT 45 MINUTES: CPT | Performed by: COUNSELOR

## 2022-04-21 NOTE — PSYCH
Virtual Regular Visit    Verification of patient location:    Patient is located in the following state in which I hold an active license [place]      Assessment/Plan:    Problem List Items Addressed This Visit        Other    Depression, major, recurrent, moderate ([address] ) - Primary          Goals addressed in session: Goal 1 and Goal 2          Reason for visit is No chief complaint on file  Encounter provider Cali Garcia    Provider located at [address] [place] [id number] [phone]      Recent Visits  No visits were found meeting these conditions  Showing recent visits within past 7 days and meeting all other requirements  Future Appointments  No visits were found meeting these conditions  Showing future appointments within next 150 days and meeting all other requirements       The patient was identified by name and date of birth  Jet Wesley was informed that this is a telemedicine visit and that the visit is being conducted [email or web address] Embedded and patient was informed this is a secure, HIPAA-complaint platform  She agrees to proceed  My office door was closed  No one else was in the room  She acknowledged consent and understanding of privacy and security of the video platform  The patient has agreed to participate and understands they can discontinue the visit at any time  Patient is aware this is a billable service  Subjective  Jet Wesley is a 32 y o  female     D:  Clinician met with Marina Ramirez via telehealth for individual therapy  Marina Ramirez discussed issues with sleeping when her twin daughters climb into bed with her  She reports that her daughter continues to complain of violent nightmares  Clinician explored technology and content that she could be watching that could be increasing nightmares  She report about 5-6 hours a sleep consistently per night   She reports feeling tired when she wakes up and often spends hours in bed during the day with lack of motivation  Clinician explored daily routines and activities  Hollie Sherwood reports a recent change in diet and that she has been having light headed ness and dizziness throughout the day  Clinician explored diet changes and encouraged Hollie Sherwood to consult with her physician if symptoms persist    Timi Fairly had trouble identify frequency or duration of symptoms or problem solving potential causes  Clinician encouraged keeping symptoms journal  She was limitedly engaged in the session stated that "things were good" but then reported many physical symptoms causing distress  She presented with stable mood and affect  P: Continue keep to meet with Hollie Sherwood every other week for individual therapy         HPI     Past Medical History:   Diagnosis Date    Bronchitis     Closed disp fx of styloid process of left radius with routine healing     Last Assessed 2017    Depression     [facility]) [facility] )     Obesity, Class III, BMI 40-49 9 (morbid obesity) [facility]     Psychiatric disorder     "mood swings"    Seizure ([facility] )     pt denies- blacksout       Past Surgical History:   Procedure Laterality Date     SECTION      [place] HYSTEROSCOPY,W/ENDO BX N/A 2/15/2022    Procedure: DILATATION AND CURETTAGE (D&C) WITH HYSTEROSCOPY REMOVAL OF RETAINED IUD ARM;  Surgeon: Oscar Gómez MD;  [facility];  Service: Gynecology    [place] INSERT INTRAUTERINE DEVICE N/A 2/15/2022    Procedure: INSERTION OF IUD MIRENA;  Surgeon: Oscar Gómez MD;  [facility];  Service: Gynecology    WISDOM TOOTH EXTRACTION      all 4       Current Outpatient Medications   Medication Sig Dispense Refill    acetaminophen (TYLENOL) 650 mg CR tablet Take 1 tablet (650 mg total) by mouth every 8 (eight) hours as needed for mild pain 30 tablet 0    hydrOXYzine HCL (ATARAX) 50 mg tablet TAKE 1 TABLET (50 MG TOTAL) BY MOUTH DAILY AT BEDTIME AS NEEDED FOR ANXIETY 30 tablet 0    ibuprofen (MOTRIN) 600 mg tablet Take 1 tablet (600 mg total) by mouth every 6 (six) hours as needed for moderate pain 30 tablet 0    multivitamin (THERAGRAN) TABS Take 1 tablet by mouth daily      sertraline (ZOLOFT) 100 mg tablet Take 1 tablet (100 mg total) by mouth daily at bedtime 90 tablet 3     No current facility-administered medications for this visit  No Known Allergies    Review of Systems    Video Exam    There were no vitals filed for this visit  Physical Exam     I spent 40 minutes directly with the patient during this visit    Arlin Rhoades 108 verbally agrees to participate in [employer]  Pt is aware that [employer] could be limited without vital signs or the ability to perform a full hands-on physical Aline Taylor understands she or the provider may request at any time to terminate the video visit and request the patient to seek care or treatment in person

## 2022-04-22 ENCOUNTER — TELEMEDICINE (OUTPATIENT)
Dept: FAMILY MEDICINE CLINIC | Facility: CLINIC | Age: 32
End: 2022-04-22
Payer: COMMERCIAL

## 2022-04-22 VITALS — BODY MASS INDEX: 44.61 KG/M2 | WEIGHT: 277.6 LBS | HEIGHT: 66 IN

## 2022-04-22 DIAGNOSIS — U07.1 COVID-19: Primary | ICD-10-CM

## 2022-04-22 DIAGNOSIS — F33.1 DEPRESSION, MAJOR, RECURRENT, MODERATE (HCC): ICD-10-CM

## 2022-04-22 DIAGNOSIS — F51.01 PRIMARY INSOMNIA: ICD-10-CM

## 2022-04-22 DIAGNOSIS — U07.1 COVID-19: ICD-10-CM

## 2022-04-22 DIAGNOSIS — E66.01 CLASS 3 SEVERE OBESITY DUE TO EXCESS CALORIES WITHOUT SERIOUS COMORBIDITY WITH BODY MASS INDEX (BMI) OF 45.0 TO 49.9 IN ADULT (HCC): ICD-10-CM

## 2022-04-22 PROBLEM — T83.39XA RETAINED INTRAUTERINE CONTRACEPTIVE DEVICE (IUD): Status: RESOLVED | Noted: 2022-02-15 | Resolved: 2022-04-22

## 2022-04-22 PROCEDURE — 99214 OFFICE O/P EST MOD 30 MIN: CPT | Performed by: FAMILY MEDICINE

## 2022-04-22 PROCEDURE — 3008F BODY MASS INDEX DOCD: CPT | Performed by: FAMILY MEDICINE

## 2022-04-22 PROCEDURE — 1036F TOBACCO NON-USER: CPT | Performed by: FAMILY MEDICINE

## 2022-04-22 RX ORDER — BENZONATATE 200 MG/1
200 CAPSULE ORAL 3 TIMES DAILY PRN
Qty: 20 CAPSULE | Refills: 0 | Status: SHIPPED | OUTPATIENT
Start: 2022-04-22 | End: 2022-04-22 | Stop reason: SDUPTHER

## 2022-04-22 RX ORDER — HYDROXYZINE 50 MG/1
50 TABLET, FILM COATED ORAL
Qty: 30 TABLET | Refills: 0 | Status: SHIPPED | OUTPATIENT
Start: 2022-04-22 | End: 2022-04-22 | Stop reason: SDUPTHER

## 2022-04-22 RX ORDER — FLUTICASONE PROPIONATE 110 UG/1
4 AEROSOL, METERED RESPIRATORY (INHALATION) 2 TIMES DAILY
Qty: 12 G | Refills: 0 | Status: SHIPPED | OUTPATIENT
Start: 2022-04-22 | End: 2022-04-22 | Stop reason: SDUPTHER

## 2022-04-22 RX ORDER — FLUTICASONE PROPIONATE 110 UG/1
4 AEROSOL, METERED RESPIRATORY (INHALATION) 2 TIMES DAILY
Qty: 12 G | Refills: 0 | Status: SHIPPED | OUTPATIENT
Start: 2022-04-22 | End: 2022-05-06

## 2022-04-22 RX ORDER — BENZONATATE 200 MG/1
200 CAPSULE ORAL 3 TIMES DAILY PRN
Qty: 20 CAPSULE | Refills: 0 | Status: SHIPPED | OUTPATIENT
Start: 2022-04-22

## 2022-04-22 RX ORDER — HYDROXYZINE 50 MG/1
50 TABLET, FILM COATED ORAL
Qty: 30 TABLET | Refills: 0 | Status: SHIPPED | OUTPATIENT
Start: 2022-04-22 | End: 2022-05-20

## 2022-04-22 NOTE — TELEPHONE ENCOUNTER
Pharmacy Change:          Medication:benzonatate (TESSALON) 200 MG capsule [[id number]]   Dosage:  How Often:Take 1 capsule (200 mg total) by mouth 3 (three) times a day as needed for cough  Quantity:  20  Last Office Visit: 4/22/22  Next Office Visit: 5/13/22  Last refilled: written 4/22/22  How many pills left:  Pharmacy:       [address] [place] [address] [address]  [address]  [address] [place] [address]  Phone: [phone] Fax: [phone]      Medication:fluticasone (FLOVENT HFA) 110 MCG/ACT inhaler [[id number]  Dosage:  How Often:Inhale 4 puffs 2 (two) times a day for 14 days Rinse mouth after use    Quantity:  12 g  Last Office Visit: 4/22/22   Next Office Visit: 5/13/22  Last refilled: written 4/22/22  How many pills left:  Pharmacy:       [facility] #[id number]- [place] - [address]  [address]  [facility] [address]  Phone: [phone] Fax: [phone]    Medication:hydrOXYzine HCL (ATARAX) 50 mg tablet [[id number]]  Dosage:  How Often:Take 1 tablet (50 mg total) by mouth daily at bedtime as needed for anxiety  Quantity:  30  Last Office Visit: 4/22/22  Next Office Visit: 5/13/22  Last refilled: written 4/22/22  How many pills left:  Pharmacy:       [address] [place] [address] [id number]  [address] [place] [address]  Phone: [phone] Fax: [phone] & ritonavir (Paxlovid) tablet therapy pack [[id number]]   Dosage:  How Often:Take 3 tablets by mouth 2 (two) times a day for 5 days Take 2 nirmatrelvir tablets + 1 ritonavir tablet together per dose  Quantity:  30  Last Office Visit: 4/22/22  Next Office Visit: 5/13/22  Last refilled: written 4/22/22  How many pills left:  Pharmacy:       [address] [facility] [address] [phone] R R 1 [phone] [id number])  [facility]  Phone: [phone] Fax: [phone]

## 2022-04-22 NOTE — PROGRESS NOTES
COVID-19 Outpatient Progress Note    Assessment/Plan:    Problem List Items Addressed This Visit        Other    Depression, major, recurrent, moderate (HCC)     Stable on zoloft         Relevant Medications    hydrOXYzine HCL (ATARAX) 50 mg tablet    Obesity due to excess calories without serious comorbidity     Diet and exercise encouraged          Primary insomnia     Stable  Continue to monitor          Relevant Medications    hydrOXYzine HCL (ATARAX) 50 mg tablet      Other Visit Diagnoses     COVID-19    -  Primary    Relevant Medications    fluticasone (FLOVENT HFA) 110 MCG/ACT inhaler    nirmatrelvir & ritonavir (Paxlovid) tablet therapy pack    benzonatate (TESSALON) 200 MG capsule         Disposition:     Patient has COVID-19 infection  Based off CDC guidelines, they were recommended to isolate for 5 days from the date of the positive test  If they remain asymptomatic, isolation may be ended followed by 5 days of wearing a mask when around othes to minimize risk of infecting others  If they have a fever, continue to stay home until fever resolves for at least 24 hours  I recommended continued isolation until at least 24 hours have passed since recovery defined as resolution of fever without the use of fever-reducing medications AND improvement in COVID symptoms AND 10 days have passed since onset of symptoms (or 10 days have passed since date of first positive viral diagnostic test for asymptomatic patients)  I have spent 15 minutes directly with the patient  Encounter provider Juliocesar Gonzalez MD    Provider located at [place] [phone]    Recent Visits  No visits were found meeting these conditions    Showing recent visits within past 7 days and meeting all other requirements  Today's Visits  Date Type Provider Dept   04/22/22 Telemedicine MD Lambert Bowling today's visits and meeting all other requirements  Future Appointments  No visits were found meeting these conditions  Showing future appointments within next 150 days and meeting all other requirements       Patient agrees to participate in a virtual check in via telephone or video visit instead of presenting to the office to address urgent/immediate medical needs  Patient is aware this is a billable service  After connecting through [facility], the patient was identified by name and date of birth  Nay Stanford was informed that this was a telemedicine visit and that the exam was being conducted confidentially over secure lines  Nay Stanford acknowledged consent and understanding of privacy and security of the telemedicine visit  I informed the patient that I have reviewed her record in Epic and presented the opportunity for her to ask any questions regarding the visit today  The patient agreed to participate  Verification of patient location:  Patient is located in the following state in which I hold an active license: [place]    Subjective:   Nay Stanford is a 32 y o  female who has been screened for COVID-19  Symptom change since last report: worsening  Patient's symptoms include fatigue, sore throat, cough, myalgias and headache  Patient denies fever, chills, malaise, congestion, rhinorrhea, anosmia, loss of taste, shortness of breath, chest tightness, abdominal pain, nausea, vomiting and diarrhea  - Date of symptom onset: 4/20/2022  - Date of positive COVID-19 test: 4/21/2022  Type of test: Home antigen       COVID-19 vaccination status: Fully vaccinated (primary series)    No results found for: [facility], [address] [facility], [address], [facility] [address], [address] [facility], [address] [facility]  Past Medical History:   Diagnosis Date    Bronchitis     Closed disp fx of styloid process of left radius with routine healing     Last Assessed [id number]    Depression     HIV (human immunodeficiency virus infection) ([facility] [address]     Obesity, Class III, BMI 40-49 9 (morbid obesity) ([facility] [address] )     Psychiatric disorder     "mood swings"    Retained intrauterine contraceptive device (IUD) 2/15/2022    Seizure ([facility] [address] )     pt denies- blacksout     Past Surgical History:   Procedure Laterality Date     SECTION      KS HYSTEROSCOPY,W/ENDO BX N/A 2/15/2022    Procedure: DILATATION AND CURETTAGE (D&C) WITH HYSTEROSCOPY REMOVAL OF RETAINED IUD ARM;  Surgeon: Olga Richards MD;  [facility];  Service: Gynecology    KS INSERT INTRAUTERINE DEVICE N/A 2/15/2022    Procedure: INSERTION OF IUD MIRENA;  Surgeon: Olga Richards MD;  [facility];  Service: Gynecology    WISDOM TOOTH EXTRACTION      all 4     Current Outpatient Medications   Medication Sig Dispense Refill    acetaminophen (TYLENOL) 650 mg CR tablet Take 1 tablet (650 mg total) by mouth every 8 (eight) hours as needed for mild pain 30 tablet 0    hydrOXYzine HCL (ATARAX) 50 mg tablet Take 1 tablet (50 mg total) by mouth daily at bedtime as needed for anxiety 30 tablet 0    multivitamin (THERAGRAN) TABS Take 1 tablet by mouth daily      sertraline (ZOLOFT) 100 mg tablet Take 1 tablet (100 mg total) by mouth daily at bedtime 90 tablet 3    benzonatate (TESSALON) 200 MG capsule Take 1 capsule (200 mg total) by mouth 3 (three) times a day as needed for cough 20 capsule 0    fluticasone (FLOVENT HFA) 110 MCG/ACT inhaler Inhale 4 puffs 2 (two) times a day for 14 days Rinse mouth after use  12 g 0    ibuprofen (MOTRIN) 600 mg tablet Take 1 tablet (600 mg total) by mouth every 6 (six) hours as needed for moderate pain (Patient not taking: Reported on 2022 ) 30 tablet 0    nirmatrelvir & ritonavir (Paxlovid) tablet therapy pack Take 3 tablets by mouth 2 (two) times a day for 5 days Take 2 nirmatrelvir tablets + 1 ritonavir tablet together per dose 30 tablet 0     No current facility-administered medications for this visit       No Known Allergies    Review of Systems   Constitutional: Positive for fatigue  Negative for chills and fever  HENT: Positive for sore throat  Negative for congestion and rhinorrhea  Respiratory: Positive for cough  Negative for chest tightness and shortness of breath  Gastrointestinal: Negative for abdominal pain, diarrhea, nausea and vomiting  Musculoskeletal: Positive for myalgias  Neurological: Positive for headaches  Objective:    Vitals:    04/22/22 1408   Weight: 126 kg (277 lb 9 6 oz)   Height: 5' 5 63" (1 667 m)       Physical Exam  Vitals and nursing note reviewed  Constitutional:       Appearance: Normal appearance  Pulmonary:      Effort: Pulmonary effort is normal    Neurological:      General: No focal deficit present  Mental Status: She is alert and oriented to person, place, and time  Psychiatric:         Mood and Affect: Mood normal          Behavior: Behavior normal          VIRTUAL VISIT DISCLAIMER    Alice Black verbally agrees to participate in [employer]  Pt is aware that [employer] could be limited without vital signs or the ability to perform a full hands-on physical [place] understands she or the provider may request at any time to terminate the video visit and request the patient to seek care or treatment in person

## 2022-05-04 ENCOUNTER — OFFICE VISIT (OUTPATIENT)
Dept: BARIATRICS | Facility: CLINIC | Age: 32
End: 2022-05-04

## 2022-05-04 DIAGNOSIS — E66.01 OBESITY, CLASS III, BMI 40-49.9 (MORBID OBESITY) (HCC): Primary | ICD-10-CM

## 2022-05-04 PROCEDURE — RECHECK
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
## 2022-05-06 ENCOUNTER — TELEMEDICINE (OUTPATIENT)
Dept: BEHAVIORAL/MENTAL HEALTH CLINIC | Facility: CLINIC | Age: 32
End: 2022-05-06
Payer: COMMERCIAL

## 2022-05-06 DIAGNOSIS — F33.1 DEPRESSION, MAJOR, RECURRENT, MODERATE (HCC): Primary | ICD-10-CM

## 2022-05-06 PROCEDURE — 90834 PSYTX W PT 45 MINUTES: CPT | Performed by: COUNSELOR

## 2022-05-06 NOTE — PSYCH
Virtual Regular Visit    Verification of patient location:    Patient is located in the following state in which I hold an active license [place]      Assessment/Plan:    Problem List Items Addressed This Visit        Other    Depression, major, recurrent, moderate ([address] ) - Primary          Goals addressed in session: Goal 1 and Goal 2          Reason for visit is No chief complaint on file  Encounter provider Vivi Avitia    Provider located at [address] [place] [id number] [phone]      Recent Visits  No visits were found meeting these conditions  Showing recent visits within past 7 days and meeting all other requirements  Future Appointments  No visits were found meeting these conditions  Showing future appointments within next 150 days and meeting all other requirements       The patient was identified by name and date of birth  Verito Martinez was informed that this is a telemedicine visit and that the visit is being conducted throughEpic Embedded and patient was informed this is a secure, HIPAA-complaint platform  She agrees to proceed  My office door was closed  No one else was in the room  She acknowledged consent and understanding of privacy and security of the video platform  The patient has agreed to participate and understands they can discontinue the visit at any time  Patient is aware this is a billable service  Subjective  Verito Martinez is a 32 y o  female     D: Clinician met with Iris Warren via telehealth for individual therapy  Iris Warren discussed recent appointment with dietitian and her dietary changes along with  Increased physical activity  She reports that she feels she has been doing well with making positive changes in her lifestyle  She reports lack of motivation and trouble getting out of bed and completing daily tasks    She reports trouble with focus and attention with task completion especially with chores at home  Clinician discussed normal level of distraction and possible strategies to help such as making daily lists and checking each off as it is completed and the benefits of this  A: Marina Ramirez reports overall things have been going well with very little stress  She presented with stable mood and affect during virtual session  P: Continue to meet with Marina Ramirez monthly for individual therapy  HPI     Past Medical History:   Diagnosis Date    Bronchitis     Closed disp fx of styloid process of left radius with routine healing     Last Assessed 2017    Depression     HIV (human immunodeficiency virus infection) ([facility] )     Obesity, Class III, BMI 40-49 9 (morbid obesity) ([facility] )     Psychiatric disorder     "mood swings"    Retained intrauterine contraceptive device (IUD) 2/15/2022    Seizure ([facility] )     pt denies- blacksout       Past Surgical History:   Procedure Laterality Date     SECTION      [place] HYSTEROSCOPY,W/ENDO BX N/A 2/15/2022    Procedure: DILATATION AND CURETTAGE (D&C) WITH HYSTEROSCOPY REMOVAL OF RETAINED IUD ARM;  Surgeon: Vitor Vega MD;  [facility];  Service: Gynecology    [place] INSERT INTRAUTERINE DEVICE N/A 2/15/2022    Procedure: INSERTION OF IUD MIRENA;  Surgeon: Vitor Vega MD;  [facility];  Service: Gynecology    WISDOM TOOTH EXTRACTION      all 4       Current Outpatient Medications   Medication Sig Dispense Refill    acetaminophen (TYLENOL) 650 mg CR tablet Take 1 tablet (650 mg total) by mouth every 8 (eight) hours as needed for mild pain 30 tablet 0    benzonatate (TESSALON) 200 MG capsule Take 1 capsule (200 mg total) by mouth 3 (three) times a day as needed for cough 20 capsule 0    fluticasone (FLOVENT HFA) 110 MCG/ACT inhaler Inhale 4 puffs 2 (two) times a day for 14 days Rinse mouth after use   12 g 0    hydrOXYzine HCL (ATARAX) 50 mg tablet Take 1 tablet (50 mg total) by mouth daily at bedtime as needed for anxiety 30 tablet 0    ibuprofen (MOTRIN) 600 mg tablet Take 1 tablet (600 mg total) by mouth every 6 (six) hours as needed for moderate pain (Patient not taking: Reported on 4/22/2022 ) 30 tablet 0    multivitamin (THERAGRAN) TABS Take 1 tablet by mouth daily      sertraline (ZOLOFT) 100 mg tablet Take 1 tablet (100 mg total) by mouth daily at bedtime 90 tablet 3     No current facility-administered medications for this visit  No Known Allergies    Review of Systems    Video Exam    There were no vitals filed for this visit  Physical Exam     I spent 37 minutes directly with the patient during this visit    Namleatha Sj Augustin [address] verbally agrees to participate in [employer]  Pt is aware that [employer] could be limited without vital signs or the ability to perform a full hands-on physical Mahaffey Harm understands she or the provider may request at any time to terminate the video visit and request the patient to seek care or treatment in person

## 2022-05-16 ENCOUNTER — RA CDI HCC (OUTPATIENT)
Dept: OTHER | Facility: HOSPITAL | Age: 32
End: 2022-05-16

## 2022-05-16 NOTE — PROGRESS NOTES
[facility]  coding opportunities       Chart reviewed, no opportunity found: CHART REVIEWED, NO OPPORTUNITY FOUND        Patients Insurance        Commercial Insurance: [employer]

## 2022-05-17 DIAGNOSIS — F33.1 DEPRESSION, MAJOR, RECURRENT, MODERATE (HCC): ICD-10-CM

## 2022-05-17 RX ORDER — SERTRALINE HYDROCHLORIDE 100 MG/1
100 TABLET, FILM COATED ORAL
Qty: 90 TABLET | Refills: 0 | Status: SHIPPED | OUTPATIENT
Start: 2022-05-17 | End: 2022-08-09

## 2022-05-20 DIAGNOSIS — F51.01 PRIMARY INSOMNIA: ICD-10-CM

## 2022-05-20 RX ORDER — HYDROXYZINE 50 MG/1
50 TABLET, FILM COATED ORAL
Qty: 30 TABLET | Refills: 0 | Status: SHIPPED | OUTPATIENT
Start: 2022-05-20

## 2022-06-08 ENCOUNTER — TELEMEDICINE (OUTPATIENT)
Dept: BEHAVIORAL/MENTAL HEALTH CLINIC | Facility: CLINIC | Age: 32
End: 2022-06-08
Payer: COMMERCIAL

## 2022-06-08 DIAGNOSIS — F33.1 DEPRESSION, MAJOR, RECURRENT, MODERATE (HCC): Primary | ICD-10-CM

## 2022-06-08 PROCEDURE — 90834 PSYTX W PT 45 MINUTES: CPT | Performed by: COUNSELOR

## 2022-06-08 NOTE — PSYCH
Virtual Regular Visit    Verification of patient location:    Patient is located in the following state in which I hold an active license [place]      Assessment/Plan:    Problem List Items Addressed This Visit        Other    Depression, major, recurrent, moderate ([address] ) - Primary          Goals addressed in session: Goal 1 and Goal 2          Reason for visit is No chief complaint on file  Encounter provider Juve Don    Provider located at [address] [place] [id number] [phone]      Recent Visits  No visits were found meeting these conditions  Showing recent visits within past 7 days and meeting all other requirements  Future Appointments  No visits were found meeting these conditions  Showing future appointments within next 150 days and meeting all other requirements       The patient was identified by name and date of birth  Glory Stroud was informed that this is a telemedicine visit and that the visit is being conducted throughEpic Embedded and patient was informed this is a secure, HIPAA-complaint platform  She agrees to proceed  My office door was closed  No one else was in the room  She acknowledged consent and understanding of privacy and security of the video platform  The patient has agreed to participate and understands they can discontinue the visit at any time  Patient is aware this is a billable service  Subjective  Glory Stroud is a 32 y o  female     D: Clinician met with Walter Gramajo via telehealth for individual therapy  Walter Gramajo reports concerns related to cleanliness of her home and the care of her children  Clinician explored worries and possible ways to address motivation issues  She reports concerns with ex paramour and being fearful that he will call [facility]  She dicussed stress related children and possible relationship with their father   She reports that their father has been asking to have a relationship but she does not feel comfortable with this due to lack of respect and talking badly about her and her family  Clinician discussed possible conflict resolutions skills and working on healthy communication with ex paramour  Clinician explored possible positives and negatives of her children having a relationship with their father  A: Jah Dykes was open and engaged in the session  She reports difficulty with lack of motivation and trouble keeping up with cleaning  She reports taking her medication as prescribed and following up with PCP  She was willing to try behavioral interventions of keeping a schedule of cleaning and working on healthy habits    P: Continue to meet with Jah Dykes      HPI     Past Medical History:   Diagnosis Date    Bronchitis     Closed disp fx of styloid process of left radius with routine healing     Last Assessed 2017    Depression     [facility] immunodeficiency [facility]     Obesity, Class III, BMI 40-49 9 (morbid [facility]) [facility] )     Psychiatric disorder     "mood swings"    Retained intrauterine contraceptive device (IUD) 2/15/2022    Seizure ([facility] )     pt denies- blacksout       Past Surgical History:   Procedure Laterality Date     SECTION      [place] HYSTEROSCOPY,W/ENDO BX N/A 2/15/2022    Procedure: DILATATION AND CURETTAGE (D&C) WITH HYSTEROSCOPY REMOVAL OF RETAINED IUD ARM;  Surgeon: Nivia Eason MD;  [facility];  Service: Gynecology    [place] INSERT INTRAUTERINE DEVICE N/A 2/15/2022    Procedure: INSERTION OF IUD MIRENA;  Surgeon: Nivia Eason MD;  [facility];  Service: Gynecology    WISDOM TOOTH EXTRACTION      all 4       Current Outpatient Medications   Medication Sig Dispense Refill    acetaminophen (TYLENOL) 650 mg CR tablet Take 1 tablet (650 mg total) by mouth every 8 (eight) hours as needed for mild pain 30 tablet 0    benzonatate (TESSALON) 200 MG capsule Take 1 capsule (200 mg total) by mouth 3 (three) times a day as needed for cough 20 capsule 0    fluticasone (FLOVENT HFA) 110 MCG/ACT inhaler Inhale 4 puffs 2 (two) times a day for 14 days Rinse mouth after use  12 g 0    hydrOXYzine HCL (ATARAX) 50 mg tablet TAKE 1 TABLET (50 MG TOTAL) BY MOUTH DAILY AT BEDTIME AS NEEDED FOR ANXIETY 30 tablet 0    ibuprofen (MOTRIN) 600 mg tablet Take 1 tablet (600 mg total) by mouth every 6 (six) hours as needed for moderate pain (Patient not taking: Reported on 4/22/2022 ) 30 tablet 0    multivitamin (THERAGRAN) TABS Take 1 tablet by mouth daily      sertraline (ZOLOFT) 100 mg tablet Take 1 tablet (100 mg total) by mouth daily at bedtime 90 tablet 0     No current facility-administered medications for this visit  No Known Allergies    Review of Systems    Video Exam    There were no vitals filed for this visit  Physical Exam     I spent 45 minutes directly with the patient during this visit    Arlin Rhoades 108 verbally agrees to participate in [employer]  Pt is aware that [employer] could be limited without vital signs or the ability to perform a full hands-on physical Zain Castro understands she or the provider may request at any time to terminate the video visit and request the patient to seek care or treatment in person

## 2022-06-23 NOTE — PROGRESS NOTES
Pre op  Psychiatrist has her on a wait list at [facility]  Reports that she has not called [facility]  Gave contact information and to schedule with Yaneli Robi  Has been eating 3 meals per day, less snacking  Drinking about  oz of water and 10 oz coffee and 1 can of coke zero  Patient reports that she has not vaped in a week  Has been working on increasing her activity with household chores  Discussed that patient has been in the pre op process for over a year  Encouraged patient to trial [facility] until she has quit vaping and able to establish with a psychiatrist  Patient agreeable to this  Explained that if patient wishes to restart the process she must be established with a psychiatrist and have quit vaping in order to proceed with the process  Patient referred to MORENO Jaquez LCSW

## 2022-06-24 ENCOUNTER — OFFICE VISIT (OUTPATIENT)
Dept: BARIATRICS | Facility: CLINIC | Age: 32
End: 2022-06-24

## 2022-06-24 VITALS — HEIGHT: 66 IN | WEIGHT: 285.2 LBS | BODY MASS INDEX: 45.83 KG/M2

## 2022-06-24 DIAGNOSIS — Z71.89 ENCOUNTER FOR PRE-BARIATRIC SURGERY COUNSELING AND EDUCATION: Primary | ICD-10-CM

## 2022-06-24 PROCEDURE — RECHECK

## 2022-07-06 ENCOUNTER — TELEMEDICINE (OUTPATIENT)
Dept: BEHAVIORAL/MENTAL HEALTH CLINIC | Facility: CLINIC | Age: 32
End: 2022-07-06
Payer: COMMERCIAL

## 2022-07-06 ENCOUNTER — OFFICE VISIT (OUTPATIENT)
Dept: FAMILY MEDICINE CLINIC | Facility: CLINIC | Age: 32
End: 2022-07-06
Payer: COMMERCIAL

## 2022-07-06 VITALS
WEIGHT: 281.2 LBS | TEMPERATURE: 97.2 F | BODY MASS INDEX: 45.19 KG/M2 | RESPIRATION RATE: 16 BRPM | DIASTOLIC BLOOD PRESSURE: 80 MMHG | HEART RATE: 86 BPM | OXYGEN SATURATION: 98 % | SYSTOLIC BLOOD PRESSURE: 120 MMHG | HEIGHT: 66 IN

## 2022-07-06 DIAGNOSIS — F33.9 DEPRESSION, RECURRENT (HCC): ICD-10-CM

## 2022-07-06 DIAGNOSIS — F33.1 DEPRESSION, MAJOR, RECURRENT, MODERATE (HCC): Primary | ICD-10-CM

## 2022-07-06 DIAGNOSIS — Z11.59 NEED FOR HEPATITIS C SCREENING TEST: ICD-10-CM

## 2022-07-06 DIAGNOSIS — E66.01 CLASS 3 SEVERE OBESITY DUE TO EXCESS CALORIES WITHOUT SERIOUS COMORBIDITY WITH BODY MASS INDEX (BMI) OF 45.0 TO 49.9 IN ADULT (HCC): Primary | ICD-10-CM

## 2022-07-06 DIAGNOSIS — F33.1 DEPRESSION, MAJOR, RECURRENT, MODERATE (HCC): ICD-10-CM

## 2022-07-06 PROCEDURE — 90834 PSYTX W PT 45 MINUTES: CPT | Performed by: COUNSELOR

## 2022-07-06 PROCEDURE — 99214 OFFICE O/P EST MOD 30 MIN: CPT | Performed by: FAMILY MEDICINE

## 2022-07-06 RX ORDER — SERTRALINE HYDROCHLORIDE 100 MG/1
150 TABLET, FILM COATED ORAL DAILY
Qty: 135 TABLET | Refills: 0 | Status: SHIPPED | OUTPATIENT
Start: 2022-07-06 | End: 2022-10-14

## 2022-07-06 NOTE — BH TREATMENT PLAN
Solis Roberts  1990         Date of Initial Treatment Plan: 2/14/22  Date of Current Treatment Plan: 7/6/22     Treatment Plan Number 2     Strengths/Personal Resources for Self Care: Good communication, good mother     Diagnosis:   1  Depression, major, recurrent, moderate (HCC)            Area of Needs: Stress management, increase motivation, decrease depression        Long Term Goal 1: Develop healthy cognitive patterns and beliefs of self and the world that lead to alleviation and help prevent the relapse of depression symptoms       Target Date: 12/2/22  Completion Date: 1/2/23         Short Term Objectives for Goal 1: 1  Identify and verbalize triggers to depression symptoms  2  Utilize behavioral strategies to increase motivation  3  Process feelings regarding situations that are out of one's control and verbalize acceptance of them        Long Term Goal 2: Establish an inward sense of self worth, confidence and competence     Target Date: 12/2/22  Completion Date: 1/2/23     Short Term Objectives for Goal 2: 1  Learn and implement assertiveness skills  2 Identify positive statement of self, acknowledges compliments of others 3  Work on eating better and increase exercise in order to decrease weight          GOAL 1: Modality: Individual 2x per month   Completion Date 1/2/23 and The person(s) responsible for carrying out the plan is  Tammy Peace     GOAL 2: Modality: Individual 2x per month   Completion Date 1/2/23 and The person(s) responsible for carrying out the plan is  Tammy Scott: Diagnosis and Treatment Plan explained to Bee Harley relates understanding diagnosis and is agreeable to Treatment Plan       Solis Roberts, 1990, actively participated in the review and update of this treatment plan during a virtual session, using the [address] [place] [address] [employer]     Solis Roberts  provided verbal consent on 7/6/2022 at 12:30 PM  The treatment plan was transcribed into the [employer] [facility] Record at a later time

## 2022-07-06 NOTE — ASSESSMENT & PLAN NOTE
Stable but still having some down and depressed symptoms at times   Increased zoloft to 150 mg from 100 mg today

## 2022-07-06 NOTE — PROGRESS NOTES
Assessment/Plan:    Depression, major, recurrent, moderate (HCC)  Stable but still having some down and depressed symptoms at times   Increased zoloft to 150 mg from 100 mg today     Obesity due to excess calories without serious comorbidity  Diet and exercise discussed   No progress with weight loss management program   To consider injection or weight loss pills  F/u with weight management as schedule to talk about saxenda injection          Diagnoses and all orders for this visit:    Class 3 severe obesity due to excess calories without serious comorbidity with body mass index (BMI) of 45 0 to 49 9 in adult (HCC)  -     Hemoglobin A1C  -     CBC and differential  -     Comprehensive metabolic panel  -     TSH, 3rd generation with Free T4 reflex; Future  -     Lipid Panel with Direct LDL reflex; Future    Need for hepatitis C screening test  -     Hepatitis C Antibody ([facility]); Future    Depression, major, recurrent, moderate (HCC)  -     sertraline (ZOLOFT) 100 mg tablet; Take 1 5 tablets (150 mg total) by mouth daily    Depression, recurrent (HCC)        Subjective:      Patient ID: Davie Kenneyd is a 32 y o  female  HPI  Here for follow up  Still struggles with anxiety and depression   Not suicidal and homicidal ideation      The following portions of the patient's history were reviewed and updated as appropriate: allergies, current medications, past family history, past medical history, past social history, past surgical history and problem list     Review of Systems   Constitutional: Negative  HENT: Negative  Eyes: Negative  Respiratory: Negative  Cardiovascular: Negative  Gastrointestinal: Negative  Endocrine: Negative  Genitourinary: Negative  Psychiatric/Behavioral: Positive for dysphoric mood  The patient is nervous/anxious            Objective:      /80 (BP Location: Left arm, Patient Position: Sitting, Cuff Size: Large)   Pulse 86   Temp (!) 97 2 °F (36 2 °C) (Skin)   Resp 16   Ht 5' 5 5" (1 664 m)   Wt 128 kg (281 lb 3 2 oz)   SpO2 98%   BMI 46 08 kg/m²          Physical Exam  Constitutional:       Appearance: Normal appearance  Cardiovascular:      Pulses: Normal pulses  Heart sounds: Normal heart sounds  Pulmonary:      Effort: Pulmonary effort is normal       Breath sounds: Normal breath sounds  Neurological:      General: No focal deficit present  Mental Status: She is alert and oriented to person, place, and time     Psychiatric:         Mood and Affect: Mood normal          Behavior: Behavior normal

## 2022-07-06 NOTE — ASSESSMENT & PLAN NOTE
Diet and exercise discussed   No progress with weight loss management program   To consider injection or weight loss pills  F/u with weight management as schedule to talk about saxenda injection

## 2022-07-06 NOTE — PSYCH
Virtual Regular Visit    Verification of patient location:    Patient is located in the following state in which I hold an active license [place]      Assessment/Plan:    Problem List Items Addressed This Visit        Other    Depression, major, recurrent, moderate ([address] ) - Primary          Goals addressed in session: Goal 1 and Goal 2          Reason for visit is No chief complaint on file  Encounter provider Mell Davila    Provider located at [address] [place] [address]  [facility] [address] [phone]      Recent Visits  No visits were found meeting these conditions  Showing recent visits within past 7 days and meeting all other requirements  Today's Visits  Date Type Provider Dept   07/06/22 Office Visit Nima Holland MD [address] [facility] today's visits and meeting all other requirements  Future Appointments  No visits were found meeting these conditions  Showing future appointments within next 150 days and meeting all other requirements       The patient was identified by name and date of birth  Ron Lopez was informed that this is a telemedicine visit and that the visit is being conducted throughEpic Embedded and patient was informed this is a secure, HIPAA-complaint platform  She agrees to proceed  My office door was closed  No one else was in the room  She acknowledged consent and understanding of privacy and security of the video platform  The patient has agreed to participate and understands they can discontinue the visit at any time  Patient is aware this is a billable service  Subjective  Ron Lopez is a 32 y o  female     D: Clinician met with Kassy Mason via telehealth for individual therapy    Linda processed recent nasreen management appointment and suggestion of medication rather than surgery due to her continuing to vape and being on the wait list for psychiatrist  She reports hope that medication will help and is open to trying medication rather than the surgery  Clinician encouraged her to reach out about wait list if she is still interest in psych evaluation  She states understanding of this  Clinician explored behavioral changes she has made and encouraged continue follow through  She reports recent increase in anti depressant but reports depressive mood has been less and she has been feeling better overall  She states some issues with falling asleep which clinician addressed with possible changes in sleep hygiene  A: Carlin King was open and engaged in the session  She reports taking medication as prescribed  Clinician updated treatment plan goals during the session  P: Continue to meet with Carlin King monthly for individual therapy         HPI     Past Medical History:   Diagnosis Date    Bronchitis     Closed disp fx of styloid process of left radius with routine healing     Last Assessed 2017    Depression     HIV (human immunodeficiency virus infection) ([place] [address] )     Obesity, Class III, BMI 40-49 9 (morbid obesity) ([place] [address] )     Psychiatric disorder     "mood swings"    Retained intrauterine contraceptive device (IUD) 2/15/2022    Seizure ([place] [address] )     pt denies- blacksout       Past Surgical History:   Procedure Laterality Date     SECTION      [place] HYSTEROSCOPY,W/ENDO BX N/A 2/15/2022    Procedure: DILATATION AND CURETTAGE (D&C) WITH HYSTEROSCOPY REMOVAL OF RETAINED IUD ARM;  Surgeon: Suma Goff MD;  [facility];  Service: Gynecology    [place] INSERT INTRAUTERINE DEVICE N/A 2/15/2022    Procedure: INSERTION OF IUD MIRENA;  Surgeon: Suma Goff MD;  [facility];  Service: Gynecology    WISDOM TOOTH EXTRACTION      all 4       Current Outpatient Medications   Medication Sig Dispense Refill    acetaminophen (TYLENOL) 650 mg CR tablet Take 1 tablet (650 mg total) by mouth every 8 (eight) hours as needed for mild pain 30 tablet 0    benzonatate (TESSALON) 200 MG capsule Take 1 capsule (200 mg total) by mouth 3 (three) times a day as needed for cough (Patient not taking: Reported on 7/6/2022) 20 capsule 0    fluticasone (FLOVENT HFA) 110 MCG/ACT inhaler Inhale 4 puffs 2 (two) times a day for 14 days Rinse mouth after use  (Patient not taking: Reported on 7/6/2022) 12 g 0    hydrOXYzine HCL (ATARAX) 50 mg tablet TAKE 1 TABLET (50 MG TOTAL) BY MOUTH DAILY AT BEDTIME AS NEEDED FOR ANXIETY (Patient not taking: Reported on 7/6/2022) 30 tablet 0    multivitamin (THERAGRAN) TABS Take 1 tablet by mouth daily      sertraline (ZOLOFT) 100 mg tablet Take 1 tablet (100 mg total) by mouth daily at bedtime 90 tablet 0    sertraline (ZOLOFT) 100 mg tablet Take 1 5 tablets (150 mg total) by mouth daily 135 tablet 0     No current facility-administered medications for this visit  No Known Allergies    Review of Systems    Video Exam    There were no vitals filed for this visit  Physical Exam     I spent 45 minutes directly with the patient during this visit    Arlin Rhoades 108 verbally agrees to participate in [employer]  Pt is aware that [employer] could be limited without vital signs or the ability to perform a full hands-on physical Wilfred Washington understands she or the provider may request at any time to terminate the video visit and request the patient to seek care or treatment in person

## 2022-07-07 ENCOUNTER — APPOINTMENT (OUTPATIENT)
Dept: LAB | Facility: HOSPITAL | Age: 32
End: 2022-07-07
Payer: COMMERCIAL

## 2022-07-07 DIAGNOSIS — E66.01 CLASS 3 SEVERE OBESITY DUE TO EXCESS CALORIES WITHOUT SERIOUS COMORBIDITY WITH BODY MASS INDEX (BMI) OF 45.0 TO 49.9 IN ADULT (HCC): ICD-10-CM

## 2022-07-07 DIAGNOSIS — Z11.59 NEED FOR HEPATITIS C SCREENING TEST: ICD-10-CM

## 2022-07-07 LAB
ALBUMIN SERPL BCP-MCNC: 3.3 G/DL (ref 3.5–5)
ALP SERPL-CCNC: 81 U/L (ref 46–116)
ALT SERPL W P-5'-P-CCNC: <6 U/L (ref 12–78)
ANION GAP SERPL CALCULATED.3IONS-SCNC: 7 MMOL/L (ref 4–13)
AST SERPL W P-5'-P-CCNC: 16 U/L (ref 5–45)
BASOPHILS # BLD AUTO: 0.03 THOUSANDS/ΜL (ref 0–0.1)
BASOPHILS NFR BLD AUTO: 1 % (ref 0–1)
BILIRUB SERPL-MCNC: 0.36 MG/DL (ref 0.2–1)
BUN SERPL-MCNC: 11 MG/DL (ref 5–25)
CALCIUM ALBUM COR SERPL-MCNC: 9.8 MG/DL (ref 8.3–10.1)
CALCIUM SERPL-MCNC: 9.2 MG/DL (ref 8.3–10.1)
CHLORIDE SERPL-SCNC: 104 MMOL/L (ref 100–108)
CHOLEST SERPL-MCNC: 163 MG/DL
CO2 SERPL-SCNC: 28 MMOL/L (ref 21–32)
CREAT SERPL-MCNC: 0.85 MG/DL (ref 0.6–1.3)
EOSINOPHIL # BLD AUTO: 0.25 THOUSAND/ΜL (ref 0–0.61)
EOSINOPHIL NFR BLD AUTO: 4 % (ref 0–6)
ERYTHROCYTE [DISTWIDTH] IN BLOOD BY AUTOMATED COUNT: 13.2 % (ref 11.6–15.1)
EST. AVERAGE GLUCOSE BLD GHB EST-MCNC: 111 MG/DL
GFR SERPL CREATININE-BSD FRML MDRD: 91 ML/MIN/1.73SQ M
GLUCOSE P FAST SERPL-MCNC: 98 MG/DL (ref 65–99)
HBA1C MFR BLD: 5.5 %
HCT VFR BLD AUTO: 38.5 % (ref 34.8–46.1)
HCV AB SER QL: NORMAL
HDLC SERPL-MCNC: 44 MG/DL
HGB BLD-MCNC: 12.1 G/DL (ref 11.5–15.4)
IMM GRANULOCYTES # BLD AUTO: 0.01 THOUSAND/UL (ref 0–0.2)
IMM GRANULOCYTES NFR BLD AUTO: 0 % (ref 0–2)
LDLC SERPL CALC-MCNC: 103 MG/DL (ref 0–100)
LYMPHOCYTES # BLD AUTO: 1.74 THOUSANDS/ΜL (ref 0.6–4.47)
LYMPHOCYTES NFR BLD AUTO: 29 % (ref 14–44)
MCH RBC QN AUTO: 28.1 PG (ref 26.8–34.3)
MCHC RBC AUTO-ENTMCNC: 31.4 G/DL (ref 31.4–37.4)
MCV RBC AUTO: 89 FL (ref 82–98)
MONOCYTES # BLD AUTO: 0.31 THOUSAND/ΜL (ref 0.17–1.22)
MONOCYTES NFR BLD AUTO: 5 % (ref 4–12)
NEUTROPHILS # BLD AUTO: 3.71 THOUSANDS/ΜL (ref 1.85–7.62)
NEUTS SEG NFR BLD AUTO: 61 % (ref 43–75)
NRBC BLD AUTO-RTO: 0 /100 WBCS
PLATELET # BLD AUTO: 252 THOUSANDS/UL (ref 149–390)
PMV BLD AUTO: 11 FL (ref 8.9–12.7)
POTASSIUM SERPL-SCNC: 4.2 MMOL/L (ref 3.5–5.3)
PROT SERPL-MCNC: 7.5 G/DL (ref 6.4–8.2)
RBC # BLD AUTO: 4.31 MILLION/UL (ref 3.81–5.12)
SODIUM SERPL-SCNC: 139 MMOL/L (ref 136–145)
TRIGL SERPL-MCNC: 79 MG/DL
TSH SERPL DL<=0.05 MIU/L-ACNC: 2.52 UIU/ML (ref 0.45–4.5)
WBC # BLD AUTO: 6.05 THOUSAND/UL (ref 4.31–10.16)

## 2022-07-07 PROCEDURE — 80061 LIPID PANEL: CPT

## 2022-07-07 PROCEDURE — 83036 HEMOGLOBIN GLYCOSYLATED A1C: CPT | Performed by: FAMILY MEDICINE

## 2022-07-07 PROCEDURE — 85025 COMPLETE CBC W/AUTO DIFF WBC: CPT | Performed by: FAMILY MEDICINE

## 2022-07-07 PROCEDURE — 36415 COLL VENOUS BLD VENIPUNCTURE: CPT | Performed by: FAMILY MEDICINE

## 2022-07-07 PROCEDURE — 80053 COMPREHEN METABOLIC PANEL: CPT | Performed by: FAMILY MEDICINE

## 2022-07-07 PROCEDURE — 86803 HEPATITIS C AB TEST: CPT

## 2022-07-07 PROCEDURE — 84443 ASSAY THYROID STIM HORMONE: CPT

## 2022-07-21 ENCOUNTER — CONSULT (OUTPATIENT)
Dept: BARIATRICS | Facility: CLINIC | Age: 32
End: 2022-07-21
Payer: COMMERCIAL

## 2022-07-21 VITALS
WEIGHT: 282 LBS | SYSTOLIC BLOOD PRESSURE: 122 MMHG | DIASTOLIC BLOOD PRESSURE: 70 MMHG | RESPIRATION RATE: 18 BRPM | HEIGHT: 66 IN | HEART RATE: 77 BPM | BODY MASS INDEX: 45.32 KG/M2

## 2022-07-21 DIAGNOSIS — R63.5 ABNORMAL WEIGHT GAIN: ICD-10-CM

## 2022-07-21 DIAGNOSIS — E66.01 OBESITY, CLASS III, BMI 40-49.9 (MORBID OBESITY) (HCC): Primary | ICD-10-CM

## 2022-07-21 DIAGNOSIS — F33.1 DEPRESSION, MAJOR, RECURRENT, MODERATE (HCC): ICD-10-CM

## 2022-07-21 PROCEDURE — 99214 OFFICE O/P EST MOD 30 MIN: CPT | Performed by: INTERNAL MEDICINE

## 2022-07-21 NOTE — PROGRESS NOTES
Assessment/Plan:  Jose David Chacon was seen today for consult  Diagnoses and all orders for this visit:    Depression, major, recurrent, moderate ([facility] )  Pt feels depression is controlled with sertraline  Consider adding Wellbutrin- denies having seizures; passed out once in high school and had a negative     Obesity, Class III, BMI 40-49 9 (morbid obesity) ([facility] [address] )  Abnormal Weight gain  Interested in [facility] - Pt needs to change lifestyle changes first    She does not feel hungry and does not eat consistently  Patient denies personal and family history of MEN2 tumors and medullary thyroid CA  Patient denies personal history of pancreatitis  Goals:  Goal protein intake 90g per day  1500 calories     Total time spent: 30 minutes with >50% face-to-face time with the patient  Follow up in approximately 3 months with Non-Surgical Physician/Advanced Practitioner  Subjective:   Chief Complaint   Patient presents with    Consult     Initial visit with Judy Gunderson  Would like to be at a healthier [facility], SB 3/8       Patient ID  HPI:  Raúl Bal  is a 32 y o  female with excess weight/obesity here to pursue weight management  Patient has decided not to pursue bariatric surgery  Most recent notes and records were reviewed      Wt Readings from Last 10 Encounters:   07/21/22 128 kg (282 lb)   07/06/22 128 kg (281 lb 3 2 oz)   06/24/22 129 kg (285 lb 3 2 oz)   04/22/22 126 kg (277 lb 9 6 oz)   03/25/22 126 kg (277 lb 9 6 oz)   03/04/22 126 kg (278 lb 9 6 oz)   02/23/22 126 kg (278 lb 6 4 oz)   02/15/22 127 kg (279 lb 5 2 oz)   01/03/22 130 kg (286 lb 6 4 oz)   12/01/21 130 kg (286 lb 12 8 oz)     Initial weight: 288 lbs (3/2021)  Current weight: 282 lbs   Change in weight: -6 lbs  Decided not to pursue bariatric surgery- needed to see psych and took too long to get in  Was in the process for over a year  Has not been feeling hungry latetly  B-breakfast bar   S-  L- usually skips  S-  D-rice chutney  S-  Drinks- 64 oz water   Alcohol- no   Exercise- no   She vapes     The following portions of the patient's history were reviewed and updated as appropriate: allergies, current medications, past family history, past medical history, past social history, past surgical history, and problem list     Review of Systems   Respiratory: Negative  Cardiovascular: Negative  Gastrointestinal: Negative  Objective:   /70 (BP Location: Left arm, Patient Position: Sitting, Cuff Size: Large)   Pulse 77   Resp 18   Ht 5' 5 5" (1 664 m)   Wt 128 kg (282 lb)   BMI 46 21 kg/m²   Constitutional: Well-developed, well-nourished and obese Body mass index is 46 21 kg/m²  [place] HEENT: No conjunctival pallor or jaundice  Pulmonary: No increased work of breathing or signs of respiratory distress  CV: Normal rate, well-perfused  GI: Obese  Non-distended  MSK: No edema   Neuro: Oriented to person, place and time  Normal Speech  Normal gait  Psych: Normal affect and mood       Labs and Imaging  Most recent labs and imaging reviewed

## 2022-08-03 ENCOUNTER — TELEMEDICINE (OUTPATIENT)
Dept: BEHAVIORAL/MENTAL HEALTH CLINIC | Facility: CLINIC | Age: 32
End: 2022-08-03
Payer: COMMERCIAL

## 2022-08-03 DIAGNOSIS — F33.9 DEPRESSION, RECURRENT (HCC): Primary | ICD-10-CM

## 2022-08-03 PROCEDURE — 90834 PSYTX W PT 45 MINUTES: CPT | Performed by: COUNSELOR

## 2022-08-03 NOTE — PSYCH
Virtual Regular Visit    Verification of patient location:    Patient is located in the following state in which I hold an active license [place]      Assessment/Plan:    Problem List Items Addressed This Visit        Other    Depression, recurrent ([address] ) - Primary          Goals addressed in session: Goal 1 and Goal 2          Reason for visit is No chief complaint on file  Encounter provider Mary Barlow    Provider located at [address] [place] [address] Irlanda  [place] [address] [facility] [address] [phone]      Recent Visits  No visits were found meeting these conditions  Showing recent visits within past 7 days and meeting all other requirements  Future Appointments  No visits were found meeting these conditions  Showing future appointments within next 150 days and meeting all other requirements       The patient was identified by name and date of birth  Ki Balderas was informed that this is a telemedicine visit and that the visit is being conducted throughEpic Embedded and patient was informed this is a secure, HIPAA-complaint platform  She agrees to proceed     My office door was closed  No one else was in the room  She acknowledged consent and understanding of privacy and security of the video platform  The patient has agreed to participate and understands they can discontinue the visit at any time  Patient is aware this is a billable service  Subjective  Ki Balderas is a 32 y o  female     D: Clinician met with Raghav Lynn via telehealth for individual therapy  Raghav Lynn discussed increase in Zoloft and feeling less depressive symptoms since this increase  She denies any adverse symptoms at all and states that she has been sleeping better at night  Clinician explored daily routine and working to increase energy level   She reports that she has begun to apply for jobs and states she would like to work part time during the day when her children are in school  Clinician discussed benefits of this and possible increase in energy getting out in the community more and engaging with others  Marlen Lauren reports continued attempts to changed diet but states that she has not lost any weight but continues to follow up with weight management and would like to get more information on possible dietary changes or medication that could assist her in decrease her weight  A: Sjvirgen Xin was open and engaged in the session and reports stable mood and affect  She denies any depressive symptoms since increasing her medication and states she takes it daily at bedtime  P: Continue to meet with Sjvirgen Lauren monthly for individual therapy        HPI     Past Medical History:   Diagnosis Date    Bronchitis     Closed disp fx of styloid process of left radius with routine healing     Last Assessed 2017    Depression     HIV (human immunodeficiency virus infection) [facility] [facility] )     Obesity, Class III, BMI 40-49 9 (morbid [facility]) [facility]     Psychiatric disorder     "mood swings"    Retained intrauterine contraceptive device (IUD) 2/15/2022    Seizure ([facility] )     pt denies- blacksout       Past Surgical History:   Procedure Laterality Date     SECTION      CO HYSTEROSCOPY,W/ENDO BX N/A 2/15/2022    Procedure: DILATATION AND CURETTAGE (D&C) WITH HYSTEROSCOPY REMOVAL OF RETAINED IUD ARM;  Surgeon: Aurelia Ballard MD;  [facility];  Service: Gynecology    CO INSERT INTRAUTERINE DEVICE N/A 2/15/2022    Procedure: INSERTION OF IUD MIRENA;  Surgeon: Aurelia Ballard MD;  [facility];  Service: Gynecology    WISDOM TOOTH EXTRACTION      all 4       Current Outpatient Medications   Medication Sig Dispense Refill    acetaminophen (TYLENOL) 650 mg CR tablet Take 1 tablet (650 mg total) by mouth every 8 (eight) hours as needed for mild pain 30 tablet 0    benzonatate (TESSALON) 200 MG capsule Take 1 capsule (200 mg total) by mouth 3 (three) times a day as needed for cough (Patient not taking: No sig reported) 20 capsule 0    fluticasone (FLOVENT HFA) 110 MCG/ACT inhaler Inhale 4 puffs 2 (two) times a day for 14 days Rinse mouth after use  (Patient not taking: Reported on 7/6/2022) 12 g 0    hydrOXYzine HCL (ATARAX) 50 mg tablet TAKE 1 TABLET (50 MG TOTAL) BY MOUTH DAILY AT BEDTIME AS NEEDED FOR ANXIETY (Patient not taking: No sig reported) 30 tablet 0    multivitamin (THERAGRAN) TABS Take 1 tablet by mouth daily      sertraline (ZOLOFT) 100 mg tablet Take 1 tablet (100 mg total) by mouth daily at bedtime (Patient not taking: Reported on 7/21/2022) 90 tablet 0    sertraline (ZOLOFT) 100 mg tablet Take 1 5 tablets (150 mg total) by mouth daily 135 tablet 0     No current facility-administered medications for this visit  No Known Allergies    Review of Systems    Video Exam    There were no vitals filed for this visit  Physical Exam     I spent 38 minutes directly with the patient during this visit    Arlin Rhoades 108 verbally agrees to participate in [employer]  Pt is aware that [employer] could be limited without vital signs or the ability to perform a full hands-on physical Alverda Cockayne understands she or the provider may request at any time to terminate the video visit and request the patient to seek care or treatment in person

## 2022-08-09 DIAGNOSIS — F33.1 DEPRESSION, MAJOR, RECURRENT, MODERATE (HCC): ICD-10-CM

## 2022-08-09 RX ORDER — SERTRALINE HYDROCHLORIDE 100 MG/1
TABLET, FILM COATED ORAL
Qty: 90 TABLET | Refills: 0 | Status: SHIPPED | OUTPATIENT
Start: 2022-08-09

## 2022-09-07 ENCOUNTER — TELEMEDICINE (OUTPATIENT)
Dept: BEHAVIORAL/MENTAL HEALTH CLINIC | Facility: CLINIC | Age: 32
End: 2022-09-07
Payer: COMMERCIAL

## 2022-09-07 ENCOUNTER — TELEPHONE (OUTPATIENT)
Dept: PSYCHIATRY | Facility: CLINIC | Age: 32
End: 2022-09-07

## 2022-09-07 DIAGNOSIS — F33.1 DEPRESSION, MAJOR, RECURRENT, MODERATE (HCC): Primary | ICD-10-CM

## 2022-09-07 PROCEDURE — 90834 PSYTX W PT 45 MINUTES: CPT | Performed by: COUNSELOR

## 2022-09-07 NOTE — TELEPHONE ENCOUNTER
contacted patient off med mgmt waitlist to verify needs of services in attempts to update waitlist  lvm for patient to contact intake dept

## 2022-09-07 NOTE — PSYCH
Virtual Regular Visit    Verification of patient location:    Patient is located in the following state in which I hold an active license [place]      Assessment/Plan:    Problem List Items Addressed This Visit        Other    Depression, major, recurrent, moderate ([address] ) - Primary          Goals addressed in session: Goal 1 and Goal 2          Reason for visit is No chief complaint on file  Encounter provider Yoli Barahona    Provider located at [address] [place] [id number] [phone]      Recent Visits  No visits were found meeting these conditions  Showing recent visits within past 7 days and meeting all other requirements  Future Appointments  No visits were found meeting these conditions  Showing future appointments within next 150 days and meeting all other requirements       The patient was identified by name and date of birth  Annia Dyer was informed that this is a telemedicine visit and that the visit is being conducted throughEpic Embedded and patient was informed this is a secure, HIPAA-complaint platform  She agrees to proceed     My office door was closed  No one else was in the room  She acknowledged consent and understanding of privacy and security of the video platform  The patient has agreed to participate and understands they can discontinue the visit at any time  Patient is aware this is a billable service  Subjective  Annia Dyer is a 32 y o  female     D: Clinician met with Niyah via telehealth for individual therapy  Niyah discussed some issues related to sleep  Clinician explored current sleep hygiene routine and possible changes that could be made to increase quality and quantity of sleep and shooting for 7-8 hours per night instead of 5 hours per night  Clinician explored ways to support better sleep and possible benefits of increase sleep   Niyah denies any depressive symptoms and reports having daily energy and motivation to complete household chores  She reports continued attempts to get a job while her daughters are a school but states that she has not found anything yet but plans to continued to look  Clinician praised follow through and efforts to get part time job  A: Arnaldo Mascorro reports taking her medication as prescribed and denied any intrusive symptoms at this time  P: Continue to meet with Arnaldo Mascorro every other week for individual therapy         HPI     Past Medical History:   Diagnosis Date    Bronchitis     Closed disp fx of styloid process of left radius with routine healing     Last Assessed 2017    Depression     [facility]     Obesity, Class III, BMI 40-49 9 (morbid obesity) ([place] [address] )     Psychiatric disorder     "mood swings"    Retained intrauterine contraceptive device (IUD) 2/15/2022    Seizure ([place] [address] )     pt denies- blacksout       Past Surgical History:   Procedure Laterality Date     SECTION      AL HYSTEROSCOPY,W/ENDO BX N/A 2/15/2022    Procedure: DILATATION AND CURETTAGE (D&C) WITH HYSTEROSCOPY REMOVAL OF RETAINED IUD ARM;  Surgeon: Ean Mahan MD;  [facility];  Service: Gynecology    AL INSERT INTRAUTERINE DEVICE N/A 2/15/2022    Procedure: INSERTION OF IUD MIRENA;  Surgeon: Ean Mahan MD;  [facility];  Service: Gynecology    WISDOM TOOTH EXTRACTION      all 4       Current Outpatient Medications   Medication Sig Dispense Refill    acetaminophen (TYLENOL) 650 mg CR tablet Take 1 tablet (650 mg total) by mouth every 8 (eight) hours as needed for mild pain 30 tablet 0    benzonatate (TESSALON) 200 MG capsule Take 1 capsule (200 mg total) by mouth 3 (three) times a day as needed for cough (Patient not taking: No sig reported) 20 capsule 0    fluticasone (FLOVENT HFA) 110 MCG/ACT inhaler Inhale 4 puffs 2 (two) times a day for 14 days Rinse mouth after use  (Patient not taking: Reported on 7/6/2022) 12 g 0    hydrOXYzine HCL (ATARAX) 50 mg tablet TAKE 1 TABLET (50 MG TOTAL) BY MOUTH DAILY AT BEDTIME AS NEEDED FOR ANXIETY (Patient not taking: No sig reported) 30 tablet 0    multivitamin (THERAGRAN) TABS Take 1 tablet by mouth daily      sertraline (ZOLOFT) 100 mg tablet Take 1 5 tablets (150 mg total) by mouth daily 135 tablet 0    sertraline (ZOLOFT) 100 mg tablet TAKE 1 TABLET BY MOUTH DAILY AT BEDTIME 90 tablet 0     No current facility-administered medications for this visit  No Known Allergies    Review of Systems    Video Exam    There were no vitals filed for this visit      Physical Exam     I spent 37 minutes directly with the patient during this visit

## 2022-10-04 ENCOUNTER — OFFICE VISIT (OUTPATIENT)
Dept: BARIATRICS | Facility: CLINIC | Age: 32
End: 2022-10-04
Payer: COMMERCIAL

## 2022-10-04 VITALS
SYSTOLIC BLOOD PRESSURE: 130 MMHG | WEIGHT: 283.8 LBS | RESPIRATION RATE: 14 BRPM | HEIGHT: 66 IN | DIASTOLIC BLOOD PRESSURE: 80 MMHG | TEMPERATURE: 98.8 F | HEART RATE: 75 BPM | BODY MASS INDEX: 45.61 KG/M2

## 2022-10-04 DIAGNOSIS — F33.1 DEPRESSION, MAJOR, RECURRENT, MODERATE (HCC): Primary | ICD-10-CM

## 2022-10-04 DIAGNOSIS — E66.01 OBESITY, CLASS III, BMI 40-49.9 (MORBID OBESITY) (HCC): ICD-10-CM

## 2022-10-04 PROCEDURE — 99203 OFFICE O/P NEW LOW 30 MIN: CPT | Performed by: FAMILY MEDICINE

## 2022-10-04 NOTE — PROGRESS NOTES
Assessment/Plan:  Thomas Galvan was seen today for follow-up  Diagnoses and all orders for this visit:    Depression, major, recurrent, moderate ([facility] [address] )  Patient states stable mood but sleep all day long  Denies SI or HI   Has in the future plans to see a psychiatrist , has seen our  to discuss her mood changes  Not a candidate for surgery because could not stop vaping but would consider if able to  Not a candidate for Phentermine    Obesity, Class III, BMI 40-49 9 (morbid obesity) ([facility] [address] )  Patient was in surgical program with dietician for one year and was not able to successfully lose weight  -     liraglutide (SAXENDA) injection;    VISIT SAXENDA  COM  INJECT SAXENDA DAILY SUBCUTANEOUSLY  -WEEK 1: 0 6mg daily  -if tolerated WEEK 2: 1 2mg daily  -if tolerated WEEK 3: 1 8mg daily  -if tolerated WEEK 4: 2 4mg daily  -if tolerated WEEK 5: 3mg daily  -IF NAUSEA/VOMITING DEVELOP STOP MEDICATION FOR A FEW DAYS AND DECREASE TO PREVIOUSLY TOLERATED DOSE  STAY HYDRATED  -IF YOU DEVELOP SEVERE ABDOMINAL PAIN WHICH MAY RADIATE TO THE BACK, SOMETIMES ASSOCIATED WITH FEVER, AND VOMITING, STOP MEDICATION AND SEEK URGENT CARE AS THIS MAY BE A SIGN OF PANCREATITIS  Patient denies personal and family history of MEN2 tumors and medullary thyroid CA  Patient denies personal history of pancreatitis  Please make sure that you are cleaning the area with alcohol wipes before each use, changing the needles before each use, and rotating the injection site  Please inject at a 90 degree angle  You can also have someone inject the Jerl Churchman for you in the back of the upper arm  Please watch this link on how to use the pen  http://www [employer]/    Patient understands the risks associated with this medicaiton and would like to proceed       Obesity: severe  Weight not at goal  She enrolled in surgical path for one year - notes reviewed  - Explained the importance of making lifestyle changes first before starting any anti-obesity medications  - Patient is interested in pursuing Conservative Program  - Initial weight loss goal of 5-10% weight loss for improved health  - Weight loss can improve patient's co-morbid conditions and/or prevent weight-related complications  Nutrition prescription:   Calorie goal: 1300-1500kcal handout given   Start focusing on breakfast choices- handout given   Motivational interview performed and patient noted changes to work on until next visit   Hydration: 64oz fluid, no sugary drinks   Goal 3 meals per day   Food log (ie ) handout given with meal planner www  myfitnesspal com,sparkpeople  com,loseit com,calorieking  com,etc  baritastic (use skinnytaste  com, dietdoctor  com or smartphone dayanna Imonomi for recipes)   Increase physical activity by 10 minutes daily  Patient denies personal and family history of  pancreatitis, thyroid cancer, MEN-2 tumors  Denies any hx of glaucoma, seizures, kidney stones, gallstones  Denies Hx of CAD, PAD, palpitations, arrhythmia  Denies uncontrolled anxiety or depression, suicidal behavior or thinking , insomnia or sleep disturbance  Total time spent:45  min, with >50% face-to-face time spent counseling patient on nonsurgical interventions for the treatment of excess weight  Discussed in detail nonsurgical options including intensive lifestyle intervention program, very low-calorie diet program and conservative program   Discussed the role of weight loss medications  Counseled patient on diet behavior and exercise modification for weight loss  Return in 5 weeks    Subjective:   Chief Complaint   Patient presents with    Follow-up       Patient ID: Dexter Beal  is a 32 y o  female with excess weight/obesity here to pursue weight management  Previous notes and records have been reviewed          HPI  Wt Readings from Last 20 Encounters:   10/04/22 129 kg (283 lb 12 8 oz)   07/21/22 128 kg (282 lb)   07/06/22 128 kg (281 lb 3 2 oz)   22 129 kg (285 lb 3 2 oz)   22 126 kg (277 lb 9 6 oz)   22 126 kg (277 lb 9 6 oz)   22 126 kg (278 lb 9 6 oz)   22 126 kg (278 lb 6 4 oz)   02/15/22 127 kg (279 lb 5 2 oz)   22 130 kg (286 lb 6 4 oz)   21 130 kg (286 lb 12 8 oz)   11/10/21 132 kg (290 lb 9 6 oz)   21 129 kg (285 lb)   21 131 kg (287 lb 12 8 oz)   07/10/21 129 kg (285 lb)   21 129 kg (284 lb)   21 129 kg (283 lb 9 6 oz)   21 126 kg (277 lb 6 4 oz)   21 128 kg (283 lb)   21 126 kg (278 lb)     Obesity/Excess Weight:  Severity: severe  Onset:  Has twins and they are 7 , she started feeling depressed after having the children  Modifiers: Commercial Weight Loss Programs-ie   Weight Watchers, Jennifer Obey, Nutrisystem, etc   Contributing factors: Stress/Emotional Eating  Associated symptoms: decreased self esteem and depression    B- wakes up and gets kids ready for school   8 am coffee and cream and [place] breakfast biscuits   S-  L-kit salad with chicken   S-  D- 5-5:30pm kids come home and have dinner together pork chops and mac and cheese   S- popcorn , or pretzels   Hydration: 3-4 glasses   Alcohol: none  Smoking: vaping  Exercise: walking on a trail when nice outside or plays with dog outside  Occupation: stay home mom  Sleep: ok  STOP bang: 3/8    Past Medical History:   Diagnosis Date    Bronchitis     Closed disp fx of styloid process of left radius with routine healing     Last Assessed [id number]    Depression     [facility]     Obesity, Class III, BMI 40-49 9 (morbid obesity) ([facility] )     Psychiatric disorder     "mood swings"    Retained intrauterine contraceptive device (IUD) 2/15/2022    [facility]     pt denies- blacksout     Past Surgical History:   Procedure Laterality Date     SECTION      VA HYSTEROSCOPY,W/ENDO BX N/A 2/15/2022    Procedure: DILATATION AND CURETTAGE (D&C) WITH HYSTEROSCOPY REMOVAL OF RETAINED IUD ARM;  Surgeon: Judy Jackson MD;  [facility];  Service: Gynecology    MD INSERT INTRAUTERINE DEVICE N/A 2/15/2022    Procedure: INSERTION OF IUD MIRENA;  Surgeon: Judy Jackson MD;  [facility];  Service: Gynecology    WISDOM TOOTH EXTRACTION      all 4     The following portions of the patient's history were reviewed and updated as appropriate: allergies, current medications, past family history, past medical history, past social history, past surgical history, and problem list     ROS:  Review of Systems   Constitutional: Negative for activity change  Fatigue  HENT: Negative for trouble swallowing  Respiratory: Negative for shortness of breath  Cardiovascular: Negative for chest pain, edema  Gastrointestinal: Negative for abdominal pain, nausea and vomiting, acid reflux, constipation/diarrhea  Endocrine: negative for heat /cold intolerance  Genitourinary: Negative for difficulty urinating  Musculoskeletal: Negative for gait problem and myalgias  Psychiatric/Behavioral: Negative for behavioral problems including anxiety /depression  Objective:  /80 (BP Location: Left arm, Patient Position: Sitting, Cuff Size: Large)   Pulse 75   Temp 98 8 °F (37 1 °C) (Tympanic)   Resp 14   Ht 5' 5 5" (1 664 m)   Wt 129 kg (283 lb 12 8 oz)   BMI 46 51 kg/m²   Constitutional: Well-developed, well-nourished and Obese Body mass index is 46 51 kg/m²  [place]anna [place] Alert, cooperative  HEENT: No conjunctival injection  No thyroid masses  Pulmonary: No increased work of breathing or signs of respiratory distress  Clear respiratory sounds  CV: Well-perfused, Regular rate and rhythm, no murmurs  Vascular: no peripheral edema  GI: Abdomen obese, Non-distended  Not tender  MSK: no sarcopenia noted   Neuro: Oriented to person, place and time  Normal Speech  Normal gait  Psych: Normal affect and mood   Normal thought process, no delusions     Labs and Imaging  Recent labs and imaging have been personally reviewed    Lab Results   Component Value Date    WBC 6 05 07/07/2022    HGB 12 1 07/07/2022    HCT 38 5 07/07/2022    MCV 89 07/07/2022     07/07/2022     Lab Results   Component Value Date    SODIUM 139 07/07/2022    K 4 2 07/07/2022     07/07/2022    CO2 28 07/07/2022    AGAP 7 07/07/2022    BUN 11 07/07/2022    CREATININE 0 85 07/07/2022    GLUC 86 02/11/2022    GLUF 98 07/07/2022    CALCIUM 9 2 07/07/2022    AST 16 07/07/2022    ALT <6 (L) 07/07/2022    ALKPHOS 81 07/07/2022    TP 7 5 07/07/2022    TBILI 0 36 07/07/2022    EGFR 91 07/07/2022     Lab Results   Component Value Date    HGBA1C 5 5 07/07/2022     Lab Results   Component Value Date    MRI5PHTBBBIW 2 515 07/07/2022     Lab Results   Component Value Date    CHOLESTEROL 163 07/07/2022     Lab Results   Component Value Date    HDL 44 (L) 07/07/2022     Lab Results   Component Value Date    TRIG 79 07/07/2022     Lab Results   Component Value Date    LDLCALC 103 (H) 07/07/2022

## 2022-10-07 ENCOUNTER — TELEPHONE (OUTPATIENT)
Dept: BARIATRICS | Facility: CLINIC | Age: 32
End: 2022-10-07

## 2022-10-07 NOTE — TELEPHONE ENCOUNTER
Patient called office and stated that Insurance Denied [place] for Saxenda Medication and would like to know if there are other medications she can take to help with weight loss  Patient stated out of pocket expense for medication even with the 25% coupon is too expensive   Patient has follow up appointment scheduled on 11/9/22

## 2022-10-12 ENCOUNTER — TELEMEDICINE (OUTPATIENT)
Dept: BEHAVIORAL/MENTAL HEALTH CLINIC | Facility: CLINIC | Age: 32
End: 2022-10-12
Payer: COMMERCIAL

## 2022-10-12 DIAGNOSIS — F33.1 DEPRESSION, MAJOR, RECURRENT, MODERATE (HCC): Primary | ICD-10-CM

## 2022-10-12 PROCEDURE — 90834 PSYTX W PT 45 MINUTES: CPT | Performed by: COUNSELOR

## 2022-10-12 NOTE — PSYCH
Virtual Regular Visit    Verification of patient location:    Patient is located in the following state in which I hold an active license [place]      Assessment/Plan:    Problem List Items Addressed This Visit        Other    Depression, major, recurrent, moderate ([address] ) - Primary          Goals addressed in session: Goal 1 and Goal 2          Reason for visit is No chief complaint on file  Encounter provider Yoli Barahona    Provider located at [address] [place] [id number] [phone]      Recent Visits  No visits were found meeting these conditions  Showing recent visits within past 7 days and meeting all other requirements  Future Appointments  No visits were found meeting these conditions  Showing future appointments within next 150 days and meeting all other requirements       The patient was identified by name and date of birth  Annia Dyer was informed that this is a telemedicine visit and that the visit is being conducted throughEpic Embedded and patient was informed this is a secure, HIPAA-complaint platform  She agrees to proceed  My office door was closed  No one else was in the room  She acknowledged consent and understanding of privacy and security of the video platform  The patient has agreed to participate and understands they can discontinue the visit at any time  Patient is aware this is a billable service  Subjective  Annia Dyer is a 32 y o  female     D: Clinician met with Nadia Dai via telehealth for individual therapy  Nadia Dai discussed continued issues with her daughter having nightmares and working through that with her as those issues arise  Clinician explored ways to support her daughter and encouraged her speak to her pediatrician if behaviors continue   Clinician gave feedback on parenting skills and interventions to support her children and set and maintain boundaries  She reports increase in behavioral issues since they her children began seeing their father more regularly on weekends  Maximino Vidal denies any depressive symptoms other than lack of energy  She reports that she has been making changes in her daily routines such as getting up and staying up during the day rather than laying in bed all day and reports increased mood due to this  She reports concerns related to her weight and desire to lose more weight  She states that she has been eating less and increasing daily physical exercise  Clinician encouraged follow up with her weight management doctor on alternative ways to lose weight and track food intake as recommended  A: Maximino Vidal presented with stable mood and affect and denies issues with depressive symptoms or low mood  P: Continue to meet with Maximino Vidal monthly for individual therapy         HPI     Past Medical History:   Diagnosis Date   • Bronchitis    • Closed disp fx of styloid process of left radius with routine healing     Last Assessed    • Depression    • HIV (human immunodeficiency virus infection) ([facility] [facility] )    • Obesity, Class III, BMI 40-49 9 (morbid obesity) ([facility] )    • Psychiatric disorder     "mood swings"   • Retained intrauterine contraceptive device (IUD) 2/15/2022   • Seizure ([facility] [address] )     pt denies- blacksout       Past Surgical History:   Procedure Laterality Date   •  SECTION     • KS HYSTEROSCOPY,W/ENDO BX N/A 2/15/2022    Procedure: DILATATION AND CURETTAGE (D&C) WITH HYSTEROSCOPY REMOVAL OF RETAINED IUD ARM;  Surgeon: Ollie Hernandez MD;  Location: [facility];  Service: Gynecology   • KS INSERT INTRAUTERINE DEVICE N/A 2/15/2022    Procedure: INSERTION OF IUD MIRENA;  Surgeon: Ollie Hernandez MD;  [facility]: [facility];  Service: Gynecology   • WISDOM TOOTH EXTRACTION      all 4       Current Outpatient Medications   Medication Sig Dispense Refill   • acetaminophen (TYLENOL) 650 mg CR tablet Take 1 tablet (650 mg total) by mouth every 8 (eight) hours as needed for mild pain (Patient not taking: Reported on 10/4/2022) 30 tablet 0   • benzonatate (TESSALON) 200 MG capsule Take 1 capsule (200 mg total) by mouth 3 (three) times a day as needed for cough (Patient not taking: No sig reported) 20 capsule 0   • fluticasone (FLOVENT HFA) 110 MCG/ACT inhaler Inhale 4 puffs 2 (two) times a day for 14 days Rinse mouth after use  (Patient not taking: Reported on 7/6/2022) 12 g 0   • hydrOXYzine HCL (ATARAX) 50 mg tablet TAKE 1 TABLET (50 MG TOTAL) BY MOUTH DAILY AT BEDTIME AS NEEDED FOR ANXIETY (Patient not taking: No sig reported) 30 tablet 0   • liraglutide (SAXENDA) injection VISIT SAXENDA  COM INJECT SAXENDA DAILY SUBCUTANEOUSLY  -WEEK 1: 0 6mg daily -if tolerated WEEK 2: 1 2mg daily -if tolerated WEEK 3: 1 8mg daily -if tolerated WEEK 4: 2 4mg daily -if tolerated WEEK 5: 3mg daily -IF NAUSEA/VOMITING DEVELOP STOP MEDICATION FOR A FEW DAYS AND DECREASE TO PREVIOUSLY TOLERATED DOSE  STAY HYDRATED  -IF YOU DEVELOP SEVERE ABDOMINAL PAIN WHICH MAY RADIATE TO THE BACK, SOMETIMES ASSOCIATED WITH FEVER, AND VOMITING, STOP MEDICATION AND SEEK URGENT CARE AS THIS MAY BE A SIGN OF PANCREATITIS  15 mL 0   • multivitamin (THERAGRAN) TABS Take 1 tablet by mouth daily     • sertraline (ZOLOFT) 100 mg tablet Take 1 5 tablets (150 mg total) by mouth daily 135 tablet 0   • sertraline (ZOLOFT) 100 mg tablet TAKE 1 TABLET BY MOUTH DAILY AT BEDTIME (Patient not taking: Reported on 10/4/2022) 90 tablet 0     No current facility-administered medications for this visit  No Known Allergies    Review of Systems    Video Exam    There were no vitals filed for this visit      Physical Exam     I spent 39 minutes directly with the patient during this visit from 1:01-1:40pm

## 2022-10-14 DIAGNOSIS — F33.1 DEPRESSION, MAJOR, RECURRENT, MODERATE (HCC): ICD-10-CM

## 2022-10-14 RX ORDER — SERTRALINE HYDROCHLORIDE 100 MG/1
TABLET, FILM COATED ORAL
Qty: 135 TABLET | Refills: 0 | Status: SHIPPED | OUTPATIENT
Start: 2022-10-14

## 2022-10-25 ENCOUNTER — OFFICE VISIT (OUTPATIENT)
Dept: BARIATRICS | Facility: CLINIC | Age: 32
End: 2022-10-25
Payer: COMMERCIAL

## 2022-10-25 VITALS
RESPIRATION RATE: 18 BRPM | SYSTOLIC BLOOD PRESSURE: 122 MMHG | BODY MASS INDEX: 44.66 KG/M2 | HEIGHT: 66 IN | WEIGHT: 277.9 LBS | DIASTOLIC BLOOD PRESSURE: 82 MMHG | HEART RATE: 99 BPM

## 2022-10-25 DIAGNOSIS — E66.01 MORBID OBESITY (HCC): Primary | ICD-10-CM

## 2022-10-25 PROCEDURE — 99214 OFFICE O/P EST MOD 30 MIN: CPT | Performed by: FAMILY MEDICINE

## 2022-10-25 NOTE — PROGRESS NOTES
Assessment/Plan:  Melissa Bryant was seen today for follow-up  Diagnoses and all orders for this visit:    Depression, major, recurrent, moderate ([facility] )  Patient states stable mood but sleep all day long- On sertraline  Denies SI or HI   Has in the future plans to see a psychiatrist , has seen our  to discuss her mood changes  Not a candidate for surgery because could not stop vaping but would consider if able to  Not a candidate for Phentermine    Obesity, Class III, BMI 40-49 9 (morbid obesity) ([facility] )  Patient was in surgical program with dietician for one year and was not able to successfully lose weight  Could not afford Saxenda was $200 per month  Lost 6 lbs on her own nutrition changes and food journal  Focus on quit vaping , consider Wellbutrin to help with this  Continue to journal food  behavioral changes discussed  Meet with  to start CBT  Return in [place]  Discussed Wellbutrin to help her quit vaping -prefers to try on her own  counseling proivded for 20 minutes regarding changes in behavior related to food cues and how to quit smoking    Obesity: severe  Weight not at goal  She enrolled in surgical path for one year - notes reviewed  - Explained the importance of making lifestyle changes first before starting any anti-obesity medications  - Patient is interested in pursuing Conservative Program  - Initial weight loss goal of 5-10% weight loss for improved health  - Weight loss can improve patient's co-morbid conditions and/or prevent weight-related complications  Nutrition prescription:  • Calorie [id number]  , keep food journal handout given  • Start focusing on breakfast choices- handout givent  • Hydration: 64oz fluid, no sugary drinks  • Goal 3 meals per day  Total time spent:30  min, with >50% face-to-face time spent counseling patient on nonsurgical interventions for the treatment of excess weight   Discussed in detail nonsurgical options including intensive lifestyle intervention program, very low-calorie diet program and conservative program   Discussed the role of weight loss medications  Counseled patient on diet behavior and exercise modification for weight loss  Return in 8 weeks    Subjective:   Chief Complaint   Patient presents with   • Follow-up     Pt would like to discuss another WLS medication option since Hernán Denied [place] for Saxkaren        Patient ID: Candice Callejas  is a 32 y o  female with excess weight/obesity here to pursue weight management  Previous notes and records have been reviewed          HPI  Wt Readings from Last 20 Encounters:   10/25/22 126 kg (277 lb 14 4 oz)   10/04/22 129 kg (283 lb 12 8 oz)   07/21/22 128 kg (282 lb)   07/06/22 128 kg (281 lb 3 2 oz)   06/24/22 129 kg (285 lb 3 2 oz)   04/22/22 126 kg (277 lb 9 6 oz)   03/25/22 126 kg (277 lb 9 6 oz)   03/04/22 126 kg (278 lb 9 6 oz)   02/23/22 126 kg (278 lb 6 4 oz)   02/15/22 127 kg (279 lb 5 2 oz)   01/03/22 130 kg (286 lb 6 4 oz)   12/01/21 130 kg (286 lb 12 8 oz)   11/10/21 132 kg (290 lb 9 6 oz)   07/30/21 129 kg (285 lb)   07/19/21 131 kg (287 lb 12 8 oz)   07/10/21 129 kg (285 lb)   06/30/21 129 kg (284 lb)   06/28/21 129 kg (283 lb 9 6 oz)   05/26/21 126 kg (277 lb 6 4 oz)   05/04/21 128 kg (283 lb)       B- wakes up and gets her twins 6yo ready for school   8 am coffee and cream and Belvita breakfast biscuits   S-  L-kit salad with chicken   S-  D- 5-5:30pm kids come home and have dinner together pork chops and mac and cheese   S- popcorn , or pretzels   Hydration: 3-4 glasses   Alcohol: none  Smoking: vaping  Exercise: walking on a trail when nice outside or plays with dog outside  Occupation: stay home mom  Sleep: ok  STOP bang: 3/8    Past Medical History:   Diagnosis Date   • Bronchitis    • Closed disp fx of styloid process of left radius with routine healing     Last Assessed [id number]   • Depression    • [facility] immunodeficiency virus infection) [facility]    • Obesity, Class III, BMI 40-49 9 (morbid obesity) ([facility] )    • Psychiatric disorder     "mood swings"   • Retained intrauterine contraceptive device (IUD) 2/15/2022   • Seizure ([facility] )     pt denies- blacksout     Past Surgical History:   Procedure Laterality Date   •  SECTION     • ND HYSTEROSCOPY,W/ENDO BX N/A 2/15/2022    Procedure: DILATATION AND CURETTAGE (D&C) WITH HYSTEROSCOPY REMOVAL OF RETAINED IUD ARM;  Surgeon: Caroline Gardner MD;  [facility]: [facility];  Service: Gynecology   • ND INSERT INTRAUTERINE DEVICE N/A 2/15/2022    Procedure: INSERTION OF IUD MIRENA;  Surgeon: Caroline Gardner MD;  [facility]: [facility];  Service: Gynecology   • WISDOM TOOTH EXTRACTION      all 4     The following portions of the patient's history were reviewed and updated as appropriate: allergies, current medications, past family history, past medical history, past social history, past surgical history, and problem list     ROS:  Review of Systems   Constitutional: Negative for activity change  Fatigue  HENT: Negative for trouble swallowing  Respiratory: Negative for shortness of breath  Cardiovascular: Negative for chest pain, edema  Gastrointestinal: Negative for abdominal pain, nausea and vomiting, acid reflux, constipation/diarrhea  Endocrine: negative for heat /cold intolerance  Genitourinary: Negative for difficulty urinating  Musculoskeletal: Negative for gait problem and myalgias  Psychiatric/Behavioral: Negative for behavioral problems including anxiety /depression  Objective:  /82 (BP Location: Left arm, Patient Position: Sitting, Cuff Size: Large)   Pulse 99   Resp 18   Ht 5' 5 5" (1 664 m)   Wt 126 kg (277 lb 14 4 oz)   BMI 45 54 kg/m²   Constitutional: Well-developed, well-nourished and Obese Body mass index is 45 54 kg/m²  Татьяна Birken[place] Alert, cooperative  HEENT: No conjunctival injection  No thyroid masses  Pulmonary: No increased work of breathing or signs of respiratory distress  Clear respiratory sounds  CV: Well-perfused, Regular rate and rhythm, no murmurs  Vascular: no peripheral edema  GI: Abdomen obese, Non-distended  Not tender  MSK: no sarcopenia noted   Neuro: Oriented to person, place and time  Normal Speech  Normal gait  Psych: Normal affect and mood  Normal thought process, no delusions     Labs and Imaging  Recent labs and imaging have been personally reviewed    Lab Results   Component Value Date    WBC 6 05 07/07/2022    HGB 12 1 07/07/2022    HCT 38 5 07/07/2022    MCV 89 07/07/2022     07/07/2022     Lab Results   Component Value Date    SODIUM 139 07/07/2022    K 4 2 07/07/2022     07/07/2022    CO2 28 07/07/2022    AGAP 7 07/07/2022    BUN 11 07/07/2022    CREATININE 0 85 07/07/2022    GLUC 86 02/11/2022    GLUF 98 07/07/2022    CALCIUM 9 2 07/07/2022    AST 16 07/07/2022    ALT <6 (L) 07/07/2022    ALKPHOS 81 07/07/2022    TP 7 5 07/07/2022    TBILI 0 36 07/07/2022    EGFR 91 07/07/2022     Lab Results   Component Value Date    HGBA1C 5 5 07/07/2022     Lab Results   Component Value Date    TZJ5VDARWNQF 2 515 07/07/2022     Lab Results   Component Value Date    CHOLESTEROL 163 07/07/2022     Lab Results   Component Value Date    HDL 44 (L) 07/07/2022     Lab Results   Component Value Date    TRIG 79 07/07/2022     Lab Results   Component Value Date    LDLCALC 103 (H) 07/07/2022

## 2022-11-15 ENCOUNTER — TELEPHONE (OUTPATIENT)
Dept: PSYCHIATRY | Facility: CLINIC | Age: 32
End: 2022-11-15

## 2022-12-05 ENCOUNTER — TELEPHONE (OUTPATIENT)
Dept: PSYCHIATRY | Facility: CLINIC | Age: 32
End: 2022-12-05

## 2022-12-05 NOTE — TELEPHONE ENCOUNTER
Patient contacted the office to schedule an appointment with provider  Writer informed patient that her provider is out of office but could be seen with another provider in the meantime if needed  Patient stated she will wait to be scheduled with Adalgisa Alonso upon her RTO but will call if she needs something sooner with another provider

## 2023-01-03 ENCOUNTER — OFFICE VISIT (OUTPATIENT)
Dept: BARIATRICS | Facility: CLINIC | Age: 33
End: 2023-01-03

## 2023-01-03 VITALS
HEART RATE: 81 BPM | BODY MASS INDEX: 45.96 KG/M2 | HEIGHT: 66 IN | SYSTOLIC BLOOD PRESSURE: 126 MMHG | RESPIRATION RATE: 14 BRPM | TEMPERATURE: 97.3 F | WEIGHT: 286 LBS | DIASTOLIC BLOOD PRESSURE: 80 MMHG

## 2023-01-03 DIAGNOSIS — E66.01 MORBID OBESITY (HCC): Primary | ICD-10-CM

## 2023-01-03 DIAGNOSIS — F17.200 NICOTINE DEPENDENCE: ICD-10-CM

## 2023-01-03 DIAGNOSIS — R63.8 ABNORMAL CRAVING: ICD-10-CM

## 2023-01-03 DIAGNOSIS — F41.9 ANXIETY: ICD-10-CM

## 2023-01-03 LAB — SL AMB POCT URINE HCG: NEGATIVE

## 2023-01-03 RX ORDER — BUPROPION HYDROCHLORIDE 100 MG/1
100 TABLET ORAL DAILY
Qty: 30 TABLET | Refills: 1 | Status: SHIPPED | OUTPATIENT
Start: 2023-01-03

## 2023-01-03 RX ORDER — TOPIRAMATE 25 MG/1
25 TABLET ORAL
Qty: 30 TABLET | Refills: 1 | Status: SHIPPED | OUTPATIENT
Start: 2023-01-03

## 2023-01-03 NOTE — PROGRESS NOTES
Assessment/Plan:  Arlyce Simmonds was seen today for follow-up  1  Morbid obesity (HCC)    - topiramate (Topamax) 25 mg tablet; Take 1 tablet (25 mg total) by mouth daily at bedtime  Dispense: 30 tablet; Refill: 1  - POCT urine HCG negative  Start Wellbutrin as well  Goal to get to bariatric surgery but needs to stop vaping first  Better in charting nutrition although gets over caloric needs  1500kcal goal given  2  Abnormal craving    - topiramate (Topamax) 25 mg tablet; Take 1 tablet (25 mg total) by mouth daily at bedtime  Dispense: 30 tablet; Refill: 1  - buPROPion (WELLBUTRIN) 100 mg tablet; Take 1 tablet (100 mg total) by mouth in the morning  Dispense: 30 tablet; Refill: 1  Reviewed the potential side effects of topiramate, which may include numbness or tingling, difficulty with word finding, metabolic acidosis, occurrence of gallstones and kidney stones, glaucoma, fatigue, upper respiratory infection symptoms, depression/anxiety, changes in taste, abdominal upset/heartburn, and trouble sleeping  Discussed the potential teratogenicity with Topamax and recommended continued use of contraception  Has IUD  Consent form signed and scanned into patient's chart  Pregnancy test negative  3  Anxiety  Add Wellbutrin   Advised if SI thoughts to stop the medication and go to ER  Not a candidate for Phentermine  4  Nicotine dependence     - buPROPion (WELLBUTRIN) 100 mg tablet; Take 1 tablet (100 mg total) by mouth in the morning  Dispense: 30 tablet;  Refill: 1  oses and all orders for this visit:  Not a candidate for surgery because could not stop vaping but would consider if able to      Obesity, Class III, BMI 40-49 9 (morbid obesity) ([facility] [address] )  Patient was in surgical program with dietician for one year and was not able to successfully lose weight  Could not afford Saxenda was $200 per month  Lost 6 lbs on her own nutrition changes and food journal but regained   Focus on quit vaping ,  Start Wellbutrin to help with this  Continue to journal food  behavioral changes discussed    Return in 1 mo  Nutrition prescription:  • Calorie [id number]  , keep food journal handout given  • Start focusing on breakfast choices- handout givent  • Hydration: 64oz fluid, no sugary drinks  • Goal 3 meals per day  Return in 6 weeks    Subjective:   Chief Complaint   Patient presents with   • Follow-up     F/u  Patient ID: Jerel Gautam  is a 28 y o  female with excess weight/obesity here to pursue weight management  Previous notes and records have been reviewed          HPI  Wt Readings from Last 20 Encounters:   01/03/23 130 kg (286 lb)   10/25/22 126 kg (277 lb 14 4 oz)   10/04/22 129 kg (283 lb 12 8 oz)   07/21/22 128 kg (282 lb)   07/06/22 128 kg (281 lb 3 2 oz)   06/24/22 129 kg (285 lb 3 2 oz)   04/22/22 126 kg (277 lb 9 6 oz)   03/25/22 126 kg (277 lb 9 6 oz)   03/04/22 126 kg (278 lb 9 6 oz)   02/23/22 126 kg (278 lb 6 4 oz)   02/15/22 127 kg (279 lb 5 2 oz)   01/03/22 130 kg (286 lb 6 4 oz)   12/01/21 130 kg (286 lb 12 8 oz)   11/10/21 132 kg (290 lb 9 6 oz)   07/30/21 129 kg (285 lb)   07/19/21 131 kg (287 lb 12 8 oz)   07/10/21 129 kg (285 lb)   06/30/21 129 kg (284 lb)   06/28/21 129 kg (283 lb 9 6 oz)   05/26/21 126 kg (277 lb 6 4 oz)     Food journal reviewed lots of carbs in the evening time  B- wakes up and gets her twins 8yo ready for school   8 am coffee and cream and Belvita breakfast biscuits   S-  L-kit salad with chicken   S-  D- 5-5:30pm kids come home and have dinner together pork chops and mac and cheese   S- popcorn , or pretzels   Hydration: 4 glasses   Alcohol: none  Smoking: vaping  Exercise: walking on a trail when nice outside or plays with dog outside  Occupation: stay home mom  Sleep: ok  STOP bang: 3/8    Past Medical History:   Diagnosis Date   • Bronchitis    • Closed disp fx of styloid process of left radius with routine healing     Last Assessed [id number]   • Depression    • HIV (human immunodeficiency virus infection) ([place] [address] )    • Obesity, Class III, BMI 40-49 9 (morbid obesity) ([place] [address] )    • Psychiatric disorder     "mood swings"   • Retained intrauterine contraceptive device (IUD) 2/15/2022   • Seizure ([place] [address] )     pt denies- blacksout     Past Surgical History:   Procedure Laterality Date   •  SECTION     • ND HYSTEROSCOPY BX ENDOMETRIUM&/POLYPC W/WO D&C N/A 2/15/2022    Procedure: DILATATION AND CURETTAGE (D&C) WITH HYSTEROSCOPY REMOVAL OF RETAINED IUD ARM;  Surgeon: Neil Mcgrath MD;  Location: [facility];  Service: Gynecology   • ND INSERTION INTRAUTERINE DEVICE IUD N/A 2/15/2022    Procedure: INSERTION OF IUD MIRENA;  Surgeon: Neil Mcgrath MD;  Location: [facility];  Service: Gynecology   • WISDOM TOOTH EXTRACTION      all 4     The following portions of the patient's history were reviewed and updated as appropriate: allergies, current medications, past family history, past medical history, past social history, past surgical history, and problem list     ROS:  Review of Systems   Constitutional: Negative for activity change  Fatigue  HENT: Negative for trouble swallowing  Respiratory: Negative for shortness of breath  Cardiovascular: Negative for chest pain, edema  Gastrointestinal: Negative for abdominal pain, nausea and vomiting, acid reflux, constipation/diarrhea  Endocrine: negative for heat /cold intolerance  Genitourinary: Negative for difficulty urinating  Musculoskeletal: Negative for gait problem and myalgias  Psychiatric/Behavioral: Negative for behavioral problems including anxiety /depression  Objective:  /80 (BP Location: Left arm, Patient Position: Sitting, Cuff Size: Large)   Pulse 81   Temp (!) 97 3 °F (36 3 °C)   Resp 14   Ht 5' 5 5" (1 664 m)   Wt 130 kg (286 lb)   BMI 46 87 kg/m²   Constitutional: Well-developed, well-nourished and Obese Body mass index is 46 87 kg/m²  Karrie Nissen Alert, cooperative  HEENT: No conjunctival injection   No thyroid masses  Pulmonary: No increased work of breathing or signs of respiratory distress  Clear respiratory sounds  CV: Well-perfused, Regular rate and rhythm, no murmurs  Vascular: no peripheral edema  GI: Abdomen obese, Non-distended  Not tender  MSK: no sarcopenia noted   Neuro: Oriented to person, place and time  Normal Speech  Normal gait  Psych: Normal affect and mood  Normal thought process, no delusions     Labs and Imaging  Recent labs and imaging have been personally reviewed    Lab Results   Component Value Date    WBC 6 05 07/07/2022    HGB 12 1 07/07/2022    HCT 38 5 07/07/2022    MCV 89 07/07/2022     07/07/2022     Lab Results   Component Value Date    SODIUM 139 07/07/2022    K 4 2 07/07/2022     07/07/2022    CO2 28 07/07/2022    AGAP 7 07/07/2022    BUN 11 07/07/2022    CREATININE 0 85 07/07/2022    GLUC 86 02/11/2022    GLUF 98 07/07/2022    CALCIUM 9 2 07/07/2022    AST 16 07/07/2022    ALT <6 (L) 07/07/2022    ALKPHOS 81 07/07/2022    TP 7 5 07/07/2022    TBILI 0 36 07/07/2022    EGFR 91 07/07/2022     Lab Results   Component Value Date    HGBA1C 5 5 07/07/2022     Lab Results   Component Value Date    [id number] 2 515 07/07/2022     Lab Results   Component Value Date    CHOLESTEROL 163 07/07/2022     Lab Results   Component Value Date    HDL 44 (L) 07/07/2022     Lab Results   Component Value Date    TRIG 79 07/07/2022     Lab Results   Component Value Date    LDLCALC 103 (H) 07/07/2022

## 2023-01-18 ENCOUNTER — TELEPHONE (OUTPATIENT)
Dept: PSYCHIATRY | Facility: CLINIC | Age: 33
End: 2023-01-18

## 2023-02-21 ENCOUNTER — TELEPHONE (OUTPATIENT)
Dept: BARIATRICS | Facility: CLINIC | Age: 33
End: 2023-02-21

## 2023-02-22 ENCOUNTER — TELEPHONE (OUTPATIENT)
Dept: BARIATRICS | Facility: CLINIC | Age: 33
End: 2023-02-22

## 2023-02-22 NOTE — TELEPHONE ENCOUNTER
Left voicemail to call [facility] office and reschedule appointment on 2/23/23 for next Friday 3/3/23 with Dr Shukri Washington if slots are still available

## 2023-03-03 ENCOUNTER — TELEMEDICINE (OUTPATIENT)
Dept: BEHAVIORAL/MENTAL HEALTH CLINIC | Facility: CLINIC | Age: 33
End: 2023-03-03

## 2023-03-03 DIAGNOSIS — F33.1 DEPRESSION, MAJOR, RECURRENT, MODERATE (HCC): Primary | ICD-10-CM

## 2023-03-03 NOTE — PSYCH
Virtual Regular Visit    Verification of patient location:    Patient is located in the following state in which I hold an active license [place]      Assessment/Plan:    Problem List Items Addressed This Visit        Other    Depression, major, recurrent, moderate ([address] ) - Primary       Goals addressed in session: Goal 1 and Goal 2          Reason for visit is No chief complaint on file  Encounter provider Leslie Pike    Provider located at [address] [place] [id number] [phone]      Recent Visits  No visits were found meeting these conditions  Showing recent visits within past 7 days and meeting all other requirements  Today's Visits  Date Type Provider Dept   03/03/23 Telemedicine [facility]   Showing today's visits and meeting all other requirements  Future Appointments  No visits were found meeting these conditions  Showing future appointments within next 150 days and meeting all other requirements       The patient was identified by name and date of birth  Kristel Jeffrey was informed that this is a telemedicine visit and that the visit is being conducted throughthe [employer] platform  She agrees to proceed     My office door was closed  No one else was in the room  She acknowledged consent and understanding of privacy and security of the video platform  The patient has agreed to participate and understands they can discontinue the visit at any time  Patient is aware this is a billable service  Subjective  Kristel Jeffrey is a 28 y o  female     D: Clinician met with [employer] via telehealth for individual therapy  [employer] reports that she has gotten a part time job in the last few months as a  at [employer]   She reports that this has been helpful for her to get out of the house and she states it is helping her to avoid staying in bed and sleeping all day while her children are at school  Clinician validated the benefits of this and discussed other possible benefits and overall mood and mental health  Fantasma Holly reports that her mood has been stable and denies any depressive symptoms  Clinician explored and encourage coping skills and self care routines to keep up mood and mental wellbeing  A: Fantasma Holly presented with stable mood and affect  She reports that she is taking her medication as prescribed and continues to focus on healthy lifestyle changed such as better nutrition and regular exercise  P: Continue to meet with Fantasma Holly monthly for individual therapy         HPI     Past Medical History:   Diagnosis Date   • Bronchitis    • Closed disp fx of styloid process of left radius with routine healing     Last Assessed    • Depression    • HIV (human immunodeficiency virus infection) ([facility] [address] )    • Obesity, Class III, BMI 40-49 9 (morbid obesity) [facility]    • Psychiatric disorder     "mood swings"   • Retained intrauterine contraceptive device (IUD) 2/15/2022   • Seizure ([facility] [address] )     pt denies- blacksout       Past Surgical History:   Procedure Laterality Date   •  SECTION     • [place] HYSTEROSCOPY BX ENDOMETRIUM&/POLYPC W/WO D&C N/A 2/15/2022    Procedure: DILATATION AND CURETTAGE (D&C) WITH HYSTEROSCOPY REMOVAL OF RETAINED IUD ARM;  Surgeon: Radha Alvarenga MD;  [facility];  Service: Gynecology   • NC INSERTION INTRAUTERINE DEVICE IUD N/A 2/15/2022    Procedure: INSERTION OF IUD MIRENA;  Surgeon: Radha Alvarenga MD;  [facility];  Service: Gynecology   • WISDOM TOOTH EXTRACTION      all 4       Current Outpatient Medications   Medication Sig Dispense Refill   • buPROPion (WELLBUTRIN) 100 mg tablet Take 1 tablet (100 mg total) by mouth in the morning 30 tablet 1   • multivitamin (THERAGRAN) TABS Take 1 tablet by mouth daily     • sertraline (ZOLOFT) 100 mg tablet TAKE 1 5 TABLETS (150MG TOTAL) BY MOUTH DAILY 135 tablet 0   • topiramate (Topamax) 25 mg tablet Take 1 tablet (25 mg total) by mouth daily at bedtime 30 tablet 1     No current facility-administered medications for this visit  No Known Allergies    Review of Systems    Video Exam    There were no vitals filed for this visit      Physical Exam     Visit Time    Visit Start Time: 11:05 am  Visit Stop Time: 11:32 am  Total Visit Duration: 27 minutes

## 2023-03-30 ENCOUNTER — TELEPHONE (OUTPATIENT)
Dept: OTHER | Facility: OTHER | Age: 33
End: 2023-03-30

## 2023-03-30 NOTE — TELEPHONE ENCOUNTER
P/t calling and has virtual appointment for 3/31/2023 at 11:00  P/t requesting a earlier time for will need to r/s   Please call back to advise

## 2023-04-17 NOTE — TELEPHONE ENCOUNTER
Crys Eduar    ED Visit Information     Ed visit date: 7/10/21  Diagnosis Description: Closed head injury, initial encounter and Laceration of scalp, initial encounter  In Network? Yes Radha Buchanan  Discharge status: Home  Discharged with meds ? No  Number of ED visits to date: 1  ED Severity:N/A     Outreach Information    Outreach successful: No 1  Date letter mailed:0  Date [address] Coordination    Follow up appointment with pcp: yes 7/19/2021  Transportation issues ? NA    [employer] Outreach    Outreach type: 3 Day Outreach  07/13/2021 01:14 PM Phone (VBI) Chance Pabon (Self) [phone] (H) Remove  Left Message - attempt 1  By Bruce Foster      Patient was scheduled on 7/13/2021 to see PCP on 7/19/2021 no further outreach required 
finger pain/injury

## 2023-06-23 ENCOUNTER — TELEPHONE (OUTPATIENT)
Dept: PSYCHIATRY | Facility: CLINIC | Age: 33
End: 2023-06-23

## 2023-06-23 NOTE — TELEPHONE ENCOUNTER
Patient contacted the office to schedule a follow up visit with provider  Patient is now scheduled for 6/26/2023  at 11am virtually

## 2023-06-26 ENCOUNTER — TELEMEDICINE (OUTPATIENT)
Dept: BEHAVIORAL/MENTAL HEALTH CLINIC | Facility: CLINIC | Age: 33
End: 2023-06-26
Payer: COMMERCIAL

## 2023-06-26 DIAGNOSIS — F33.1 DEPRESSION, MAJOR, RECURRENT, MODERATE (HCC): Primary | ICD-10-CM

## 2023-06-26 PROCEDURE — 90832 PSYTX W PT 30 MINUTES: CPT | Performed by: COUNSELOR

## 2023-06-26 NOTE — PSYCH
Virtual Regular Visit    Verification of patient location:    Patient is located at Home in the following state in which I hold an active license [place]      Assessment/Plan:    Problem List Items Addressed This Visit        Other    Depression, major, recurrent, moderate ([facility] [address] ) - Primary       Goals addressed in session: Goal 1 and Goal 2          Reason for visit is   Chief Complaint   Patient presents with   • Virtual Regular Visit        Encounter provider Dimple Donald    Provider located at [address] [place] [address] [address] [facility], [place] [address] [phone]      Recent Visits  Date Type Provider Dept   06/23/23 Telephone [facility] recent visits within past 7 days and meeting all other requirements  Today's Visits  Date Type Provider Dept   06/26/23 [facility]   Showing today's visits and meeting all other requirements  Future Appointments  No visits were found meeting these conditions  Showing future appointments within next 150 days and meeting all other requirements       The patient was identified by name and date of birth  Dilan Agarwal was informed that this is a telemedicine visit and that [facility]  She agrees to proceed     My office door was closed  No one else was in the room  She acknowledged consent and understanding of privacy and security of the video platform  The patient has agreed to participate and understands they can discontinue the visit at any time  Patient is aware this is a billable service       Subjective  Dilan Agarwal is a 28 y o  female       HPI     Past Medical History:   Diagnosis Date   • Bronchitis    • Closed disp fx of styloid process of left radius with routine healing     Last Assessed [id number]   • Depression    • HIV (human "immunodeficiency virus infection) ([place] [address] )    • Obesity, Class III, BMI 40-49 9 (morbid obesity) ([place] [address] )    • Psychiatric disorder     \"mood swings\"   • Retained intrauterine contraceptive device (IUD) 2/15/2022   • Seizure ([place] [address] )     pt denies- blacksout       Past Surgical History:   Procedure Laterality Date   •  SECTION     • [place] HYSTEROSCOPY BX ENDOMETRIUM&/POLYPC W/WO D&C N/A 2/15/2022    Procedure: DILATATION AND CURETTAGE (D&C) WITH HYSTEROSCOPY REMOVAL OF RETAINED IUD ARM;  Surgeon: Rosalind Echevarria MD;  [facility];  Service: Gynecology   • [place] INSERTION INTRAUTERINE DEVICE IUD N/A 2/15/2022    Procedure: INSERTION OF IUD MIRENA;  Surgeon: Rosalind Echevarria MD;  [facility];  Service: Gynecology   • WISDOM TOOTH EXTRACTION      all 4       Current Outpatient Medications   Medication Sig Dispense Refill   • buPROPion (WELLBUTRIN) 100 mg tablet Take 1 tablet (100 mg total) by mouth in the morning 30 tablet 1   • multivitamin (THERAGRAN) TABS Take 1 tablet by mouth daily     • sertraline (ZOLOFT) 100 mg tablet TAKE 1 5 TABLETS (150MG TOTAL) BY MOUTH DAILY 135 tablet 0   • topiramate (Topamax) 25 mg tablet Take 1 tablet (25 mg total) by mouth daily at bedtime 30 tablet 1     No current facility-administered medications for this visit  No Known Allergies    Review of Systems    Video Exam    There were no vitals filed for this visit  Physical Exam     Behavioral Health Psychotherapy Progress Note    Psychotherapy Provided: Individual Psychotherapy     1  Depression, major, recurrent, moderate ([place] [address] )            Goals addressed in session: Goal 1 and Goal 2     DATA: Clinician met with Mich Salgado via telehealth for individual therapy  Twanjessica Salgado reports that she has only been working one day a week which she would like to be working more often  She 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   Mich Salgado reports some fluctuation in her mood and " "reports that she has not been taking her medication on weekends when going to her [facility] house  Clinician discussed importance of taking medication as prescribed in order to get the full effect  She verbalized understanding of this  She reports continuing to work on losing weight on her own and has not followed up with weight management  Clinician updated treatment plan during the session  During this session, this clinician used the following therapeutic modalities: Engagement Strategies, Client-centered Therapy and Supportive Psychotherapy    Substance Abuse was not addressed during this session  If the client is diagnosed with a co-occurring substance use disorder, please indicate any changes in the frequency or amount of use: n/a  Stage of change for addressing substance use diagnoses: No substance use/Not applicable    ASSESSMENT:  Gurvinder Gonzalez presents with a Euthymic/ normal mood  her affect is Normal range and intensity, which is congruent, with her mood and the content of the session  The client has made progress on their goals  Maryuri Wright was open and engaged in the session  Gurvinder Gonzalez presents with a none risk of suicide, none risk of self-harm, and none risk of harm to others  For any risk assessment that surpasses a \"low\" rating, a safety plan must be developed  A safety plan was indicated: no  If yes, describe in detail n/a    PLAN: Between sessions, Gurvinder Gonzalez will work on utilizing coping skills regularly  At the next session, the therapist will use Engagement Strategies and Client-centered Therapy to address mood regulation  Behavioral Health Treatment Plan and Discharge Planning: Gurvindre Gonzalez is aware of and agrees to continue to work on their treatment plan  They have identified and are working toward their discharge goals   yes    Visit start and stop times:    06/26/23  Start Time: 1058  Stop Time: 1124  Total Visit Time: 26 minutes    "

## 2023-06-26 NOTE — BH TREATMENT PLAN
Outpatient Behavioral Health Psychotherapy Treatment Plan    Amna Morrisjordyn  1990     Date of Initial Psychotherapy Assessment: 2/14/22  Date of Current Treatment Plan: 06/26/23  Treatment Plan Target Date: 11/23/23  Treatment Plan Expiration Date: 12/23/23    Diagnosis:   1  Depression, major, recurrent, moderate ([facility] [address] )            Area(s) of Need: healthy communication, decrease depression  Long Term Goal 1 (in the client's own words): Decrease depressive symptoms    Stage of Change: Preparation    Target Date for completion: 11/23/23     Anticipated therapeutic modalities: Client centered, CBT, supportive psychotherapy     People identified to complete this goal: Avelino Mckee      Objective 1: (identify the means of measuring success in meeting the objective): Work on engaging in hobbies and activities she enjoys to increase mood  Objective 2: (identify the means of measuring success in meeting the objective): Teach and practice coping skills to be utilized to help manage emotions  Long Term Goal 2 (in the client's own words): healthy communication skills    Stage of Change: Action    Target Date for completion: 11/23/23     Anticipated therapeutic modalities: Client Centered, supportive psychotherapy, solution focused  People identified to complete this goal: Avelino Mckee      Objective 1: (identify the means of measuring success in meeting the objective): Teach assertive communication skills to be used to express emotions to others  Objective 2: (identify the means of measuring success in meeting the objective): Teach differences between assertive, passive aggressive and aggressive communication         I am currently under the care of a [facility] psychiatric provider: no    My [facility] psychiatric provider is: n/a    I am currently taking psychiatric medications: Yes, as prescribed    I feel that I will be ready for discharge from mental health care when I reach the following (measurable goal/objective): To able to manage my emotions and communicate needs in a healthy way  For children and adults who have a legal guardian:   Has there been any change to custody orders and/or guardianship status? NA  If yes, attach updated documentation  I have updated my Crisis Plan and have been offered a copy of this plan    [address]: Diagnosis and Treatment Plan explained to [facility] acknowledges an understanding of their diagnosis  Blake Pinzon agrees to this treatment plan  I have been offered a copy of this Treatment Plan  Yes    Blake Pinzon, 1990, actively participated in the review and update of this treatment plan during a virtual session, using the [employer]  Blake Pinzon  provided verbal consent on 6/26/2023 at 11:13 AM  The treatment plan was transcribed into the [facility] at a later time

## 2023-07-15 DIAGNOSIS — F33.1 DEPRESSION, MAJOR, RECURRENT, MODERATE (HCC): ICD-10-CM

## 2023-07-16 RX ORDER — SERTRALINE HYDROCHLORIDE 100 MG/1
TABLET, FILM COATED ORAL
Qty: 135 TABLET | Refills: 0 | Status: SHIPPED | OUTPATIENT
Start: 2023-07-16

## 2023-07-24 ENCOUNTER — TELEMEDICINE (OUTPATIENT)
Dept: BEHAVIORAL/MENTAL HEALTH CLINIC | Facility: CLINIC | Age: 33
End: 2023-07-24
Payer: COMMERCIAL

## 2023-07-24 DIAGNOSIS — F33.1 DEPRESSION, MAJOR, RECURRENT, MODERATE (HCC): Primary | ICD-10-CM

## 2023-07-24 PROCEDURE — 90832 PSYTX W PT 30 MINUTES: CPT | Performed by: COUNSELOR

## 2023-07-24 NOTE — PSYCH
Virtual Regular Visit    Verification of patient location:    Patient is located at Home in the following state in which I hold an active license [place]      Assessment/Plan:    Problem List Items Addressed This Visit        Other    Depression, major, recurrent, moderate ([address]) - Primary       Goals addressed in session: Goal 1 and Goal 2          Reason for visit is No chief complaint on file. Encounter provider Cisco Felix    Provider located at [address] [place] [address]  [facility] [address] [phone]      Recent Visits  No visits were found meeting these conditions. Showing recent visits within past 7 days and meeting all other requirements  Today's Visits  Date Type Provider Dept   07/24/23 Telemedicine [address]   Showing today's visits and meeting all other requirements  Future Appointments  No visits were found meeting these conditions. Showing future appointments within next 150 days and meeting all other requirements       The patient was identified by name and date of birth. Lina Gallardo was informed that this is a telemedicine visit and that the visit is being conducted throughthe [facility] [employer]. She agrees to proceed. .  My office door was closed. No one else was in the room. She acknowledged consent and understanding of privacy and security of the video platform. The patient has agreed to participate and understands they can discontinue the visit at any time. Patient is aware this is a billable service. Subjective  Lina Gallardo is a 28 y.o. female  .       HPI     Past Medical History:   Diagnosis Date   • Bronchitis    • Closed disp fx of styloid process of left radius with routine healing     Last Assessed [id number]   • Depression    • HIV (human immunodeficiency virus infection) ([address])    • Obesity, Class III, BMI 40-49.9 (morbid obesity) [facility])    • Psychiatric disorder     "mood swings"   • Retained intrauterine contraceptive device (IUD) 2/15/2022   • Seizure ([address])     pt denies- blacksout       Past Surgical History:   Procedure Laterality Date   •  SECTION     • NM HYSTEROSCOPY BX ENDOMETRIUM&/POLYPC W/WO D&C N/A 2/15/2022    Procedure: DILATATION AND CURETTAGE (D&C) WITH HYSTEROSCOPY REMOVAL OF RETAINED IUD ARM;  Surgeon: Omid Arreaga MD;  [facility];  Service: Gynecology   • NM INSERTION INTRAUTERINE DEVICE IUD N/A 2/15/2022    Procedure: INSERTION OF IUD MIRENA;  Surgeon: Omid Arreaga MD;  [facility];  Service: Gynecology   • WISDOM TOOTH EXTRACTION      all 4       Current Outpatient Medications   Medication Sig Dispense Refill   • buPROPion (WELLBUTRIN) 100 mg tablet Take 1 tablet (100 mg total) by mouth in the morning 30 tablet 1   • multivitamin (THERAGRAN) TABS Take 1 tablet by mouth daily     • sertraline (ZOLOFT) 100 mg tablet TAKE 1.5 TABLETS (150 MG TOTAL) BY MOUTH DAILY 135 tablet 0   • topiramate (Topamax) 25 mg tablet Take 1 tablet (25 mg total) by mouth daily at bedtime 30 tablet 1     No current facility-administered medications for this visit. No Known Allergies    Review of Systems    Video Exam    There were no vitals filed for this visit. Physical Exam     Behavioral Health Psychotherapy Progress Note    Psychotherapy Provided: Individual Psychotherapy     1. Depression, major, recurrent, moderate ([address])            Goals addressed in session: Goal 1 and Goal 2     DATA: Clinician met with Leilani Macedo via telehealth for individual therapy. Leilani Macedo discussed issues with sleep and feeling tired during the day. She reports not falling a sleep during the day after she takes her girls to school. Clinician explored changes she could make to improve sleep quality of sleep. Leilani Macedo denies any depressive symptoms and reports that she has been taking her medication as prescribed. Olive Campos discussed recent injury of her daughters arm while playing with family. She discussed her waking up in the night when she is in pain. During this session, this clinician used the following therapeutic modalities: Engagement Strategies, Client-centered Therapy, Solution-Focused Therapy and Supportive Psychotherapy    Substance Abuse was not addressed during this session. If the client is diagnosed with a co-occurring substance use disorder, please indicate any changes in the frequency or amount of use: n/a. Stage of change for addressing substance use diagnoses: No substance use/Not applicable    ASSESSMENT:  Nuzhat Watters presents with a Euthymic/ normal mood. her affect is Normal range and intensity, which is congruent, with her mood and the content of the session. The client has made progress on their goals. Olive Campos was open and engaged in the session. Nuzhat Watters presents with a none risk of suicide, none risk of self-harm, and none risk of harm to others. For any risk assessment that surpasses a "low" rating, a safety plan must be developed. A safety plan was indicated: no  If yes, describe in detail n/a    PLAN: Between sessions, Nuzhat Watters will work on better time management and sleep hygiene. At the next session, the therapist will use Engagement Strategies, Client-centered Therapy and Solution-Focused Therapy to address sleep hygiene and depressive symptoms. Behavioral Health Treatment Plan and Discharge Planning: Nuzhat Watters is aware of and agrees to continue to work on their treatment plan. They have identified and are working toward their discharge goals.  yes    Visit start and stop times:    07/24/23  Start Time: 1103  Stop Time: 1128  Total Visit Time: 25 minutes

## 2023-08-15 ENCOUNTER — RA CDI HCC (OUTPATIENT)
Dept: OTHER | Facility: HOSPITAL | Age: 33
End: 2023-08-15

## 2023-08-15 NOTE — PROGRESS NOTES
[address] [facility] coding opportunities       Chart reviewed, no opportunity found: CHART REVIEWED, NO OPPORTUNITY FOUND      UDC review    Patients Insurance        Commercial Insurance: [employer]

## 2023-08-22 ENCOUNTER — TELEPHONE (OUTPATIENT)
Dept: PSYCHIATRY | Facility: CLINIC | Age: 33
End: 2023-08-22

## 2023-10-14 DIAGNOSIS — F33.1 DEPRESSION, MAJOR, RECURRENT, MODERATE (HCC): ICD-10-CM

## 2023-10-16 RX ORDER — SERTRALINE HYDROCHLORIDE 100 MG/1
TABLET, FILM COATED ORAL
Qty: 135 TABLET | Refills: 0 | Status: SHIPPED | OUTPATIENT
Start: 2023-10-16

## 2023-10-16 NOTE — TELEPHONE ENCOUNTER
Requested medication(s) are due for refill today: Yes  Patient has already received a courtesy refill: No  Other reason request has been forwarded to provider: per protocol.  Clerical advised to schedule pt

## 2023-10-17 ENCOUNTER — TELEPHONE (OUTPATIENT)
Dept: PSYCHIATRY | Facility: CLINIC | Age: 33
End: 2023-10-17

## 2023-10-17 NOTE — TELEPHONE ENCOUNTER
Called and spoke with patient to which she requested to reschedule with provider. Scheduled for 10/20 10am virtual via text.

## 2023-10-20 ENCOUNTER — TELEMEDICINE (OUTPATIENT)
Dept: BEHAVIORAL/MENTAL HEALTH CLINIC | Facility: CLINIC | Age: 33
End: 2023-10-20
Payer: COMMERCIAL

## 2023-10-20 DIAGNOSIS — F33.1 DEPRESSION, MAJOR, RECURRENT, MODERATE (HCC): Primary | ICD-10-CM

## 2023-10-20 PROCEDURE — 90832 PSYTX W PT 30 MINUTES: CPT | Performed by: COUNSELOR

## 2023-10-20 NOTE — PSYCH
Virtual Regular Visit    Verification of patient location:    Patient is located at Home in the following state in which I hold an active license [place]      Assessment/Plan:    Problem List Items Addressed This Visit        Other    Depression, major, recurrent, moderate ([address]) - Primary       Goals addressed in session: Goal 1 and Goal 2          Reason for visit is No chief complaint on file. Encounter provider Jay Bliss    Provider located at [address] [place] [id number] [phone]      Recent Visits  Date Type Provider Dept   10/20/23 [address] [place]   10/17/23 Telephone [address] [place] [address] recent visits within past 7 days and meeting all other requirements  Future Appointments  No visits were found meeting these conditions. Showing future appointments within next 150 days and meeting all other requirements       The patient was identified by name and date of birth. Toni Dumont was informed that this is a telemedicine visit and that the visit is being conducted throughthe [email or web address]. She agrees to proceed. .  My office door was closed. No one else was in the room. She acknowledged consent and understanding of privacy and security of the video platform. The patient has agreed to participate and understands they can discontinue the visit at any time. Patient is aware this is a billable service. Subjective  Toni Dumont is a 28 y.o. female  .       HPI     Past Medical History:   Diagnosis Date   • Bronchitis    • Closed disp fx of styloid process of left radius with routine healing     Last Assessed [id number]   • Depression    • HIV (human immunodeficiency virus infection) ([address])    • Obesity, Class III, BMI 40-49.9 (morbid obesity) ([address])    • Psychiatric disorder     "mood swings"   • Retained intrauterine contraceptive device (IUD) 2/15/2022   • Seizure ([address])     pt denies- blacksout       Past Surgical History:   Procedure Laterality Date   •  SECTION     • ND HYSTEROSCOPY BX ENDOMETRIUM&/POLYPC W/WO D&C N/A 2/15/2022    Procedure: DILATATION AND CURETTAGE (D&C) WITH HYSTEROSCOPY REMOVAL OF RETAINED IUD ARM;  Surgeon: Winsome Nix MD;  [facility];  Service: Gynecology   • ND INSERTION INTRAUTERINE DEVICE IUD N/A 2/15/2022    Procedure: INSERTION OF IUD MIRENA;  Surgeon: Winsome Nix MD;  [facility];  Service: Gynecology   • WISDOM TOOTH EXTRACTION      all 4       Current Outpatient Medications   Medication Sig Dispense Refill   • buPROPion (WELLBUTRIN) 100 mg tablet Take 1 tablet (100 mg total) by mouth in the morning 30 tablet 1   • multivitamin (THERAGRAN) TABS Take 1 tablet by mouth daily     • sertraline (ZOLOFT) 100 mg tablet TAKE 1.5 TABLETS (150MG TOTAL) BY MOUTH DAILY 135 tablet 0   • topiramate (Topamax) 25 mg tablet Take 1 tablet (25 mg total) by mouth daily at bedtime 30 tablet 1     No current facility-administered medications for this visit. No Known Allergies    Review of Systems    Video Exam    There were no vitals filed for this visit. Physical Exam   Behavioral Health Psychotherapy Progress Note    Psychotherapy Provided: Individual Psychotherapy     1. Depression, major, recurrent, moderate ([address])            Goals addressed in session: Goal 1 and Goal 2     DATA: Clinician met with Jannet Castorena via telehealth for individual therapy. Jannet Castorena discussed her children's transition into the school year. She reports they are doing well in school and that she is able to get things done while her kids are at school. Clinician explored use of coping skills during the day and employment. 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  She denies any depressive symptoms at this time and taking her medication as prescribed. During this session, this clinician used the following therapeutic modalities: Engagement Strategies, Client-centered Therapy, and Supportive Psychotherapy    Substance Abuse was not addressed during this session. If the client is diagnosed with a co-occurring substance use disorder, please indicate any changes in the frequency or amount of use: n/a. Stage of change for addressing substance use diagnoses: No substance use/Not applicable    ASSESSMENT:  Kat Jaramillo presents with a Euthymic/ normal mood. her affect is Normal range and intensity, which is congruent, with her mood and the content of the session. The client has made progress on their goals. Mick James was open and engaged in the session. Kat Jaramillo presents with a none risk of suicide, none risk of self-harm, and none risk of harm to others. For any risk assessment that surpasses a "low" rating, a safety plan must be developed. A safety plan was indicated: no  If yes, describe in detail n/a    PLAN: Between sessions, Kat Jaramillo will continued to utilize healthy coping skills. At the next session, the therapist will use Engagement Strategies, Client-centered Therapy, and Supportive Psychotherapy to address depressive symptoms. Behavioral Health Treatment Plan and Discharge Planning: Kat Jaramillo is aware of and agrees to continue to work on their treatment plan. They have identified and are working toward their discharge goals.  yes    Visit start and stop times:    10/20/23  Start Time: 1007  Stop Time: 1035  Total Visit Time: 28 minutes

## 2023-11-17 ENCOUNTER — TELEMEDICINE (OUTPATIENT)
Dept: BEHAVIORAL/MENTAL HEALTH CLINIC | Facility: CLINIC | Age: 33
End: 2023-11-17
Payer: COMMERCIAL

## 2023-11-17 DIAGNOSIS — F33.1 DEPRESSION, MAJOR, RECURRENT, MODERATE (HCC): Primary | ICD-10-CM

## 2023-11-17 PROCEDURE — 90832 PSYTX W PT 30 MINUTES: CPT | Performed by: COUNSELOR

## 2023-11-17 NOTE — BH TREATMENT PLAN
Outpatient Behavioral Health Psychotherapy Treatment Plan     Anthony Fitzgerald  1990      Date of Initial Psychotherapy Assessment: 2/14/22  Date of Current Treatment Plan: 11/17/23  Treatment Plan Target Date: 4/15/24  Treatment Plan Expiration Date: 5/15/24     Diagnosis:   1. Depression, major, recurrent, moderate ([facility])                Area(s) of Need: healthy communication, decrease depression. Long Term Goal 1 (in the client's own words): Decrease depressive symptoms     Stage of Change: Preparation     Target Date for completion: 5/15/24             Anticipated therapeutic modalities: Client centered, CBT, supportive psychotherapy             People identified to complete this goal: Lea Ceballos                    Objective 1: (identify the means of measuring success in meeting the objective): Work on engaging in hobbies and activities she enjoys to increase mood. Objective 2: (identify the means of measuring success in meeting the objective): Teach and practice coping skills to be utilized to help manage emotions. Long Term Goal 2 (in the client's own words): healthy communication skills     Stage of Change: Action     Target Date for completion: 5/15/24            Anticipated therapeutic modalities: Client Centered, supportive psychotherapy, solution focused. People identified to complete this goal: Lea Ceballos                    Objective 1: (identify the means of measuring success in meeting the objective): Teach assertive communication skills to be used to express emotions to others. Objective 2: (identify the means of measuring success in meeting the objective): Teach differences between assertive, passive aggressive and aggressive communication.         I am currently under the care of a [facility] psychiatric provider: no     My [facility] psychiatric provider is: n/a     I am currently taking psychiatric medications: Yes, as prescribed     I feel that I will be ready for discharge from mental health care when I reach the following (measurable goal/objective): To able to manage my emotions and communicate needs in a healthy way. For children and adults who have a legal guardian:          Has there been any change to custody orders and/or guardianship status? NA. If yes, attach updated documentation. I have updated my Crisis Plan and have been offered a copy of this plan     [facility]: Diagnosis and Treatment Plan explained to [facility] acknowledges an understanding of their diagnosis. Ifeoma Mercedes agrees to this treatment plan. I have been offered a copy of this Treatment Plan. Yes    Ifeoma Mercedes, 1990, actively participated in the review and update of this treatment plan during a virtual session, using the [employer]. Ifeoma Mercedes  provided verbal consent on 11/17/2023 at 11:45 AM. The treatment plan was transcribed into the [employer] at a later time.

## 2023-11-17 NOTE — PSYCH
Virtual Regular Visit    Verification of patient location:    Patient is located at Home in the following state in which I hold an active license [place]      Assessment/Plan:    Problem List Items Addressed This Visit     Depression, major, recurrent, moderate ([address]) - Primary       Goals addressed in session: Goal 1 and Goal 2          Reason for visit is No chief complaint on file. Encounter provider Toma Luciano    Provider located at [address] [place] [id number] [phone]      Recent Visits  Date Type Provider Dept   11/17/23 [address]   Showing recent visits within past 7 days and meeting all other requirements  Future Appointments  No visits were found meeting these conditions. Showing future appointments within next 150 days and meeting all other requirements       The patient was identified by name and date of birth. Afsaneh Lacy was informed that this is a telemedicine visit and that [facility]. She agrees to proceed. .  My office door was closed. No one else was in the room. She acknowledged consent and understanding of privacy and security of the video platform. The patient has agreed to participate and understands they can discontinue the visit at any time. Patient is aware this is a billable service. Subjective  Afsaneh Lacy is a 35 y.o. female  .       HPI     Past Medical History:   Diagnosis Date   • Bronchitis    • Closed disp fx of styloid process of left radius with routine healing     Last Assessed [id number]   • Depression    • HIV (human immunodeficiency virus infection) ([address])    • Obesity, Class III, BMI 40-49.9 (morbid obesity) ([address])    • Psychiatric disorder     "mood swings"   • Retained intrauterine contraceptive device (IUD) 2/15/2022   • Seizure ([address]) pt denies- blacksout       Past Surgical History:   Procedure Laterality Date   •  SECTION     • [place] HYSTEROSCOPY BX ENDOMETRIUM&/POLYPC W/WO D&C N/A 2/15/2022    Procedure: DILATATION AND CURETTAGE (D&C) WITH HYSTEROSCOPY REMOVAL OF RETAINED IUD ARM;  Surgeon: Teresa Winters MD;  [facility];  Service: Gynecology   • [place] INSERTION INTRAUTERINE DEVICE IUD N/A 2/15/2022    Procedure: INSERTION OF IUD MIRENA;  Surgeon: Teresa Winters MD;  [facility];  Service: Gynecology   • WISDOM TOOTH EXTRACTION      all 4       Current Outpatient Medications   Medication Sig Dispense Refill   • buPROPion (WELLBUTRIN) 100 mg tablet Take 1 tablet (100 mg total) by mouth in the morning 30 tablet 1   • multivitamin (THERAGRAN) TABS Take 1 tablet by mouth daily     • sertraline (ZOLOFT) 100 mg tablet TAKE 1.5 TABLETS (150MG TOTAL) BY MOUTH DAILY 135 tablet 0   • topiramate (Topamax) 25 mg tablet Take 1 tablet (25 mg total) by mouth daily at bedtime 30 tablet 1     No current facility-administered medications for this visit. No Known Allergies    Review of Systems    Video Exam    There were no vitals filed for this visit. Physical Exam     Behavioral Health Psychotherapy Progress Note    Psychotherapy Provided: Individual Psychotherapy     1. Depression, major, recurrent, moderate ([address])            Goals addressed in session: Goal 1 and Goal 2     DATA: Clinician met with Lady Peace via telehealth for individual therapy. 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 She reports completing necessary training and reports she would like to work full time in the future while her children are at school. She reports some depressive symptoms during the day when children are at school with increased sleep and lack of motivation. Clinician explored ways to motivate herself and utilize coping skills. She reports taking her medication as prescribed.   During this session, this clinician used the following therapeutic modalities: Engagement Strategies, Client-centered Therapy, and Supportive Psychotherapy    Substance Abuse was not addressed during this session. If the client is diagnosed with a co-occurring substance use disorder, please indicate any changes in the frequency or amount of use: n/a. Stage of change for addressing substance use diagnoses: No substance use/Not applicable    ASSESSMENT:  Bhavani Tapia presents with a Euthymic/ normal mood. her affect is Normal range and intensity, which is congruent, with her mood and the content of the session. The client has made progress on their goals. Griffin Le was open and engaged in the session. Bhavani Tapia presents with a none risk of suicide, none risk of self-harm, and none risk of harm to others. For any risk assessment that surpasses a "low" rating, a safety plan must be developed. A safety plan was indicated: no  If yes, describe in detail n/a    PLAN: Between sessions, Bhavani Tapia will continued to utilize healthy coping skills. At the next session, the therapist will use Engagement Strategies, Client-centered Therapy, and Solution-Focused Therapy to address depressive symptoms. Behavioral Health Treatment Plan and Discharge Planning: Bhavani Tapia is aware of and agrees to continue to work on their treatment plan. They have identified and are working toward their discharge goals.  yes    Visit start and stop times:    11/17/23  Start Time: 1105  Stop Time: 1130  Total Visit Time: 25 minutes

## 2023-12-18 NOTE — RESULT NOTES
"   Medication List            Accurate as of December 18, 2023 11:57 AM. If you have any questions, ask your nurse or doctor.                CONTINUE taking these medications        Morning Afternoon Evening Bedtime   atorvastatin 40 MG tablet  Also known as: LIPITOR  Take 40 mg by mouth daily              empagliflozin 10 MG Tabs tablet  Also known as: JARDIANCE  Take 1 tablet (10 mg) by mouth daily              ferrous gluconate 324 (38 Fe) MG tablet  Also known as: FERGON  Take 1 tablet (324 mg) by mouth every other day        Monday, Wednesday, Friday only        furosemide 40 MG tablet  Also known as: LASIX  Take 40 mg by mouth daily              ibuprofen 400 MG tablet  Also known as: ADVIL/MOTRIN  Take 1 tablet (400 mg) by mouth every 6 hours as needed for moderate pain            losartan 25 MG tablet  Also known as: COZAAR  Take 1 tablet (25 mg) by mouth daily            melatonin 3 MG tablet  Take 3 mg by mouth nightly as needed for sleep            metoprolol succinate ER 25 MG 24 hr tablet  Also known as: TOPROL XL  Take 12.5 mg by mouth daily        1/2 tablet only        nicotine polacrilex 4 MG gum  Also known as: NICORETTE  How to take it: When the urge to smoke occurs, chew gum until you feel a tingle, then \"park\" the gum in your cheek until the tingle is gone. Re-chew every few minutes and \"park\" again, chewing one piece for 30 minutes. Do not eat or drink while chewing. Follow this schedule: Weeks 1 to 6: One piece of gum every 1 to 2 hours. Use at least 9 pieces a day, but no more than 24. Weeks 7 to 9: One piece of gum every 2 to 4 hours. Weeks 10 to 12: One piece of gum every 4 to 8 hours.            nitroGLYcerin 0.4 MG sublingual tablet  Also known as: NITROSTAT  Place 0.4 mg under the tongue every 5 minutes as needed for chest pain For chest pain place 1 tablet under the tongue every 5 minutes for 3 doses. If symptoms persist 5 minutes after 1st dose call 911.            spironolactone 25 MG " Verified Results  * XR WRIST 3+ VIEW LEFT [id number] 10:06AM Carrie Jensen Order Number: [id number]     Test Name Result Flag Reference   XR WRIST 3+ VW LEFT (Report)     LEFT WRIST     INDICATION: Fracture follow-up     COMPARISON: November 18, 2016     VIEWS: 3; 3 images     FINDINGS:     Overlying fiberglass cast obscures fine osseous detail  There is no discrete fracture seen through the fiberglass cast  Alignment of osseous structures appears anatomic     No lytic or blastic lesions are seen  Soft tissues are unremarkable         IMPRESSION:     No discrete fracture seen to the overlying cast  Alignment of osseous structures appears anatomic       Workstation performed: [id number]     Signed by:   Bill Esquivel MD   12/2/16 tablet  Also known as: ALDACTONE  Take 12.5 mg by mouth        1/2 tablet only                  Where to Get Your Medications        These medications were sent to [id number] - [place], [place] - [address] [place] [address] AT [facility] & [address]  [address], [place] [address]      Phone: [phone]   losartan 25 MG tablet       Morning Evening   Furosemide 40 mg - 1 tablet  Jardiance 10 mg - 1 tablet  Losartan 25 mg - 1 tablet  Metoprolol succinate 25 mg - 1/2 tablet  Spironolactone 25 mg - 1/2 tablet    Ferrous sulfate 325 goes in [facility] pillbox Atorvastatin 40 mg - 1 tablet     Schedule in 3 months with PharmD and Dr. López

## 2023-12-20 ENCOUNTER — TELEPHONE (OUTPATIENT)
Dept: PSYCHIATRY | Facility: CLINIC | Age: 33
End: 2023-12-20

## 2023-12-20 NOTE — TELEPHONE ENCOUNTER
NO-SHOW LETTER MAILED TO Linda Oneill.  ADDRESS: [address] [place] [address]  Bettye VANEGAS [id number]

## 2024-01-16 DIAGNOSIS — F33.1 DEPRESSION, MAJOR, RECURRENT, MODERATE (HCC): ICD-10-CM

## 2024-01-16 RX ORDER — SERTRALINE HYDROCHLORIDE 100 MG/1
150 TABLET, FILM COATED ORAL DAILY
Qty: 135 TABLET | Refills: 0 | Status: SHIPPED | OUTPATIENT
Start: 2024-01-16

## 2024-02-26 ENCOUNTER — OFFICE VISIT (OUTPATIENT)
Dept: FAMILY MEDICINE CLINIC | Facility: CLINIC | Age: 34
End: 2024-02-26
Payer: COMMERCIAL

## 2024-02-26 VITALS
OXYGEN SATURATION: 98 % | RESPIRATION RATE: 16 BRPM | SYSTOLIC BLOOD PRESSURE: 130 MMHG | HEIGHT: 66 IN | BODY MASS INDEX: 47.09 KG/M2 | DIASTOLIC BLOOD PRESSURE: 80 MMHG | WEIGHT: 293 LBS | HEART RATE: 110 BPM | TEMPERATURE: 98.4 F

## 2024-02-26 DIAGNOSIS — F51.01 PRIMARY INSOMNIA: ICD-10-CM

## 2024-02-26 DIAGNOSIS — E66.01 CLASS 3 SEVERE OBESITY DUE TO EXCESS CALORIES WITHOUT SERIOUS COMORBIDITY WITH BODY MASS INDEX (BMI) OF 45.0 TO 49.9 IN ADULT (HCC): ICD-10-CM

## 2024-02-26 DIAGNOSIS — Z23 ENCOUNTER FOR IMMUNIZATION: ICD-10-CM

## 2024-02-26 DIAGNOSIS — E66.01 CLASS 3 SEVERE OBESITY DUE TO EXCESS CALORIES WITH SERIOUS COMORBIDITY AND BODY MASS INDEX (BMI) OF 45.0 TO 49.9 IN ADULT (HCC): ICD-10-CM

## 2024-02-26 DIAGNOSIS — Z00.00 ANNUAL PHYSICAL EXAM: Primary | ICD-10-CM

## 2024-02-26 DIAGNOSIS — F33.1 DEPRESSION, MAJOR, RECURRENT, MODERATE (HCC): ICD-10-CM

## 2024-02-26 DIAGNOSIS — F33.9 DEPRESSION, RECURRENT (HCC): ICD-10-CM

## 2024-02-26 PROBLEM — E66.813 CLASS 3 SEVERE OBESITY DUE TO EXCESS CALORIES WITHOUT SERIOUS COMORBIDITY WITH BODY MASS INDEX (BMI) OF 45.0 TO 49.9 IN ADULT (HCC): Status: ACTIVE | Noted: 2021-02-19

## 2024-02-26 PROCEDURE — 90471 IMMUNIZATION ADMIN: CPT

## 2024-02-26 PROCEDURE — 90686 IIV4 VACC NO PRSV 0.5 ML IM: CPT

## 2024-02-26 PROCEDURE — 99395 PREV VISIT EST AGE 18-39: CPT | Performed by: FAMILY MEDICINE

## 2024-02-26 PROCEDURE — 99214 OFFICE O/P EST MOD 30 MIN: CPT | Performed by: FAMILY MEDICINE

## 2024-02-26 NOTE — PROGRESS NOTES
ADULT ANNUAL PHYSICAL  [facility] PRACTICE    NAME: Linda Oneill  AGE: 33 y.o. SEX: female  : 1990     DATE: 2024     Assessment and Plan:     Problem List Items Addressed This Visit        Other    Depression, major, recurrent, moderate (HCC)     Stable on current meds         Class 3 severe obesity due to excess calories without serious comorbidity with body mass index (BMI) of 45.0 to 49.9 in adult (HCC)    Relevant Orders    Ambulatory Referral to Weight Management    Primary insomnia     Not sleeping well  Sleep hygiene discussed          Depression, recurrent (HCC)     Stable on zoloft        Other Visit Diagnoses     Annual physical exam    -  Primary    Relevant Orders    CBC and differential    Comprehensive metabolic panel    TSH, 3rd generation with Free T4 reflex    Lipid panel    Encounter for immunization        Relevant Orders    influenza vaccine, quadrivalent, 0.5 mL, preservative-free, for adult and pediatric patients 6 mos+ (AFLURIA, FLUARIX, FLULAVAL, FLUZONE) (Completed)          Immunizations and preventive care screenings were discussed with patient today. Appropriate education was printed on patient's after visit summary.    Counseling:  Alcohol/drug use: discussed moderation in alcohol intake, the recommendations for healthy alcohol use, and avoidance of illicit drug use.  Dental Health: discussed importance of regular tooth brushing, flossing, and dental visits.  Injury prevention: discussed safety/seat belts, safety helmets, smoke detectors, carbon dioxide detectors, and smoking near bedding or upholstery.  Sexual health: discussed sexually transmitted diseases, partner selection, use of condoms, avoidance of unintended pregnancy, and contraceptive alternatives.  Exercise: the importance of regular exercise/physical activity was discussed. Recommend exercise 3-5 times per week for at least 30 minutes.       Depression  Screening and Follow-up Plan: Patient was screened for depression during today's encounter. They screened negative with a PHQ-9 score of 4.        No follow-ups on file.     Chief Complaint:     Chief Complaint   Patient presents with   • Physical Exam     Patient being seen for Physical Exam      History of Present Illness:     Adult Annual Physical   Patient here for a comprehensive physical exam. The patient reports no problems.    Diet and Physical Activity  Diet/Nutrition: well balanced diet and consuming 3-5 servings of fruits/vegetables daily.   Exercise: no formal exercise.      Depression Screening  PHQ-2/9 Depression Screening    Little interest or pleasure in doing things: 0 - not at all  Feeling down, depressed, or hopeless: 0 - not at all  Trouble falling or staying asleep, or sleeping too much: 1 - several days  Feeling tired or having little energy: 1 - several days  Poor appetite or overeatin - several days  Feeling bad about yourself - or that you are a failure or have let yourself or your family down: 1 - several days  Trouble concentrating on things, such as reading the newspaper or watching television: 0 - not at all  Moving or speaking so slowly that other people could have noticed. Or the opposite - being so fidgety or restless that you have been moving around a lot more than usual: 0 - not at all  Thoughts that you would be better off dead, or of hurting yourself in some way: 0 - not at all  PHQ-9 Score: 4  PHQ-9 Interpretation: No or Minimal depression       General Health  Sleep: sleeps well.   Hearing: normal - bilateral.  Vision: goes for regular eye exams.   Dental: regular dental visits and brushes teeth twice daily.       /GYN Health  Follows with gynecology? yes  Last menstrual period: no periods on IUD  Contraceptive method:  IUD .  History of STDs?: no.     Advanced Care Planning  Do you have an advanced directive? no  Do you have a durable medical power of ? no  ACP  "document given to the patient? yes      Review of Systems:     Review of Systems   Constitutional: Negative.    HENT: Negative.     Eyes: Negative.    Respiratory: Negative.     Cardiovascular: Negative.    Gastrointestinal: Negative.    Endocrine: Negative.    Genitourinary: Negative.    Musculoskeletal: Negative.    Skin: Negative.    Allergic/Immunologic: Negative.    Neurological: Negative.    Hematological: Negative.    Psychiatric/Behavioral: Negative.        Past Medical History:     Past Medical History:   Diagnosis Date   • Bronchitis    • Closed disp fx of styloid process of left radius with routine healing     Last Assessed 2017   • Depression    • HIV (human immunodeficiency virus infection) ([facility])    • Obesity, Class III, BMI 40-49.9 (morbid obesity) ([facility])    • Psychiatric disorder     \"mood swings\"   • Retained intrauterine contraceptive device (IUD) 2/15/2022   • Seizure ([facility])     pt denies- blacksout      Past Surgical History:     Past Surgical History:   Procedure Laterality Date   •  SECTION     • [place] HYSTEROSCOPY BX ENDOMETRIUM&/POLYPC W/WO D&C N/A 2/15/2022    Procedure: DILATATION AND CURETTAGE (D&C) WITH HYSTEROSCOPY REMOVAL OF RETAINED IUD ARM;  Surgeon: Janet Millard MD;  Location: [facility];  Service: Gynecology   • [place] INSERTION INTRAUTERINE DEVICE IUD N/A 2/15/2022    Procedure: INSERTION OF IUD MIRENA;  Surgeon: Janet Millard MD;  Location: [facility];  Service: Gynecology   • WISDOM TOOTH EXTRACTION      all 4      Social History:     Social History     Socioeconomic History   • Marital status: Single     Spouse name: None   • Number of children: None   • Years of education: None   • Highest education level: None   Occupational History   • None   Tobacco Use   • Smoking status: Former     Current packs/day: 0.00     Types: Cigarettes     Quit date: 2010     Years since quittin.1   • Smokeless tobacco: Current   Vaping Use   • Vaping status: Every Day " "  • Substances: Nicotine, Flavoring   Substance and Sexual Activity   • Alcohol use: Not Currently     Comment: rarely   • Drug use: No   • Sexual activity: Not Currently   Other Topics Concern   • None   Social History Narrative   • None     Social Determinants of Health     Financial Resource Strain: Not on file   Food Insecurity: Not on file   Transportation Needs: Not on file   Physical Activity: Not on file   Stress: Not on file   Social Connections: Not on file   Intimate Partner Violence: Not on file   Housing Stability: Not on file      Family History:     Family History   Problem Relation Age of Onset   • Hypertension Mother    • No Known Problems Father    • Obesity Daughter    • Breast cancer Neg Hx    • Ovarian cancer Neg Hx    • Uterine cancer Neg Hx    • Cervical cancer Neg Hx       Current Medications:     Current Outpatient Medications   Medication Sig Dispense Refill   • multivitamin (THERAGRAN) TABS Take 1 tablet by mouth daily     • sertraline (ZOLOFT) 100 mg tablet TAKE 1.5 TABLETS (150 MG TOTAL) BY MOUTH DAILY 135 tablet 0     No current facility-administered medications for this visit.      Allergies:     No Known Allergies   Physical Exam:     /80 (BP Location: Left arm, Patient Position: Sitting, Cuff Size: Large)   Pulse (!) 110   Temp 98.4 °F (36.9 °C) (Tympanic)   Resp 16   Ht 5' 5.83\" (1.672 m)   Wt (!) 137 kg (303 lb)   SpO2 98%   BMI 49.16 kg/m²     Physical Exam  Vitals and nursing note reviewed.   Constitutional:       Appearance: She is well-developed. She is obese.   HENT:      Head: Normocephalic and atraumatic.      Right Ear: Tympanic membrane normal.      Left Ear: Tympanic membrane normal.      Nose: Nose normal.      Mouth/Throat:      Mouth: Mucous membranes are moist.   Eyes:      Pupils: Pupils are equal, round, and reactive to light.   Cardiovascular:      Rate and Rhythm: Normal rate and regular rhythm.      Pulses: Normal pulses.      Heart sounds: Normal " heart sounds.   Pulmonary:      Effort: Pulmonary effort is normal. No respiratory distress.      Breath sounds: Normal breath sounds. No wheezing.   Abdominal:      General: Bowel sounds are normal.      Palpations: Abdomen is soft.   Musculoskeletal:         General: No deformity. Normal range of motion.      Cervical back: Normal range of motion and neck supple.   Skin:     General: Skin is warm.      Capillary Refill: Capillary refill takes less than 2 seconds.   Neurological:      General: No focal deficit present.      Mental Status: She is alert and oriented to person, place, and time.   Psychiatric:         Mood and Affect: Mood normal.         Behavior: Behavior normal.          Grace Marx MD   [facility]

## 2024-02-28 ENCOUNTER — TELEPHONE (OUTPATIENT)
Age: 34
End: 2024-02-28

## 2024-02-28 NOTE — TELEPHONE ENCOUNTER
Unable to schedule appt for pt, decision tree advise to transfer to office for scheduling. Please call pt's father calderon to schedule appt for pt. His number is [phone]

## 2024-03-18 ENCOUNTER — TELEPHONE (OUTPATIENT)
Age: 34
End: 2024-03-18

## 2024-03-18 ENCOUNTER — CLINICAL SUPPORT (OUTPATIENT)
Dept: BARIATRICS | Facility: CLINIC | Age: 34
End: 2024-03-18

## 2024-03-18 VITALS — WEIGHT: 260 LBS | BODY MASS INDEX: 41.78 KG/M2 | HEIGHT: 66 IN

## 2024-03-18 DIAGNOSIS — E66.01 CLASS 3 SEVERE OBESITY DUE TO EXCESS CALORIES WITH SERIOUS COMORBIDITY AND BODY MASS INDEX (BMI) OF 45.0 TO 49.9 IN ADULT (HCC): ICD-10-CM

## 2024-03-18 DIAGNOSIS — E66.01 CLASS 3 SEVERE OBESITY DUE TO EXCESS CALORIES WITHOUT SERIOUS COMORBIDITY WITH BODY MASS INDEX (BMI) OF 45.0 TO 49.9 IN ADULT (HCC): ICD-10-CM

## 2024-03-18 PROCEDURE — RECHECK

## 2024-03-18 NOTE — PROGRESS NOTES
Spoke to patient on the phone:    Patient is interested in the bariatric surgical process. Patient qualifies for surgery with current comorbidities and BMI. Discussed the entire surgical workup process and all requirements of [facility] program and patient's insurance. Answered all questions at the time of the phone call. All SW/RD questions redirected to the next appointment, the 2-hour evaluation.      Patient verbalized understanding of the surgical process at [facility] and had no further questions at this time.

## 2024-03-18 NOTE — TELEPHONE ENCOUNTER
Patient calling in regarding appointment she did not receive a call for. Warm transferred patient to the office for further assistance.

## 2024-03-20 ENCOUNTER — APPOINTMENT (OUTPATIENT)
Dept: LAB | Facility: HOSPITAL | Age: 34
End: 2024-03-20
Payer: COMMERCIAL

## 2024-03-20 LAB
ALBUMIN SERPL BCP-MCNC: 3.9 G/DL (ref 3.5–5)
ALP SERPL-CCNC: 71 U/L (ref 34–104)
ALT SERPL W P-5'-P-CCNC: 11 U/L (ref 7–52)
ANION GAP SERPL CALCULATED.3IONS-SCNC: 4 MMOL/L (ref 4–13)
AST SERPL W P-5'-P-CCNC: 23 U/L (ref 13–39)
BASOPHILS # BLD AUTO: 0.03 THOUSANDS/ÂΜL (ref 0–0.1)
BASOPHILS NFR BLD AUTO: 0 % (ref 0–1)
BILIRUB SERPL-MCNC: 0.3 MG/DL (ref 0.2–1)
BUN SERPL-MCNC: 10 MG/DL (ref 5–25)
CALCIUM SERPL-MCNC: 9.7 MG/DL (ref 8.4–10.2)
CHLORIDE SERPL-SCNC: 107 MMOL/L (ref 96–108)
CHOLEST SERPL-MCNC: 151 MG/DL
CO2 SERPL-SCNC: 26 MMOL/L (ref 21–32)
CREAT SERPL-MCNC: 0.78 MG/DL (ref 0.6–1.3)
EOSINOPHIL # BLD AUTO: 0.16 THOUSAND/ÂΜL (ref 0–0.61)
EOSINOPHIL NFR BLD AUTO: 2 % (ref 0–6)
ERYTHROCYTE [DISTWIDTH] IN BLOOD BY AUTOMATED COUNT: 12.9 % (ref 11.6–15.1)
GFR SERPL CREATININE-BSD FRML MDRD: 100 ML/MIN/1.73SQ M
GLUCOSE P FAST SERPL-MCNC: 124 MG/DL (ref 65–99)
HCT VFR BLD AUTO: 39.4 % (ref 34.8–46.1)
HDLC SERPL-MCNC: 35 MG/DL
HGB BLD-MCNC: 12.6 G/DL (ref 11.5–15.4)
IMM GRANULOCYTES # BLD AUTO: 0.03 THOUSAND/UL (ref 0–0.2)
IMM GRANULOCYTES NFR BLD AUTO: 0 % (ref 0–2)
LDLC SERPL CALC-MCNC: 81 MG/DL (ref 0–100)
LYMPHOCYTES # BLD AUTO: 2.34 THOUSANDS/ÂΜL (ref 0.6–4.47)
LYMPHOCYTES NFR BLD AUTO: 28 % (ref 14–44)
MCH RBC QN AUTO: 28.6 PG (ref 26.8–34.3)
MCHC RBC AUTO-ENTMCNC: 32 G/DL (ref 31.4–37.4)
MCV RBC AUTO: 90 FL (ref 82–98)
MONOCYTES # BLD AUTO: 0.42 THOUSAND/ÂΜL (ref 0.17–1.22)
MONOCYTES NFR BLD AUTO: 5 % (ref 4–12)
NEUTROPHILS # BLD AUTO: 5.54 THOUSANDS/ÂΜL (ref 1.85–7.62)
NEUTS SEG NFR BLD AUTO: 65 % (ref 43–75)
NONHDLC SERPL-MCNC: 116 MG/DL
NRBC BLD AUTO-RTO: 0 /100 WBCS
PLATELET # BLD AUTO: 222 THOUSANDS/UL (ref 149–390)
PMV BLD AUTO: 11.2 FL (ref 8.9–12.7)
POTASSIUM SERPL-SCNC: 4.5 MMOL/L (ref 3.5–5.3)
PROT SERPL-MCNC: 7.5 G/DL (ref 6.4–8.4)
RBC # BLD AUTO: 4.4 MILLION/UL (ref 3.81–5.12)
SODIUM SERPL-SCNC: 137 MMOL/L (ref 135–147)
TRIGL SERPL-MCNC: 173 MG/DL
TSH SERPL DL<=0.05 MIU/L-ACNC: 3.95 UIU/ML (ref 0.45–4.5)
WBC # BLD AUTO: 8.52 THOUSAND/UL (ref 4.31–10.16)

## 2024-03-28 NOTE — PROGRESS NOTES
Bariatric Behavioral Health Evaluation  Type of surgery  RNY  months required: monthly weight checks   Surgery Date: Aug-Sept  Deadline:Dec  Surgeon: Dr. Jose L Gaines       Presenting Problem:  Linda Oneill  is a 33 y.o.   female   :  1990   Presents for a bariatric evaluation for surgery. Patient has history weight loss attempts and has struggled with success.     Is the patient seeking Bariatric Surgery Eval?  yes   The pt shared that she is seeking bariatric surgery because she  cares for her children and has no energy during the day as she eats unhealthy. Reports she has tried diets in the past and has not been successful in weight loss.  Family has had surgery father, mother, sister, brother and sister in law. They all have been successful.      Research?no     Realizes Post- Op Requirements? Yes but will learn more through the program.     Pre-morbid level of function and history of present illness: (any kind of medical conditions) none    Support system: mother, father and siblings.   Living situation: resides with her drt age 9.    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      Current unhealthy eating habits: no portion control, fast food, unhealthy snacks, grazer, coffee  Mindless eating: no  Stress eating: yes  Emotional eating:yes     Mental Health, Trauma and Substance use Assessment    Psychiatric/Psychological Treatment Diagnosis: Pt is in agreement with Dx in [facility] chart of MDD  Outpatient Counselor: Yes since  Therapist- Dafne Boyle-[facility] in [place]      Psychiatrist: no   Have you had any Mental Health in-pt admissions? No    History of Eating Disorders: no    Family History-Drug or alcohol hx:no  Have you had any Drug and/or Alcohol use and treatment history: no  Have you had any  Substance Use Treatment? No    Tobacco/Vaping History: vape's last used last month. Signed policy      Domestic Violence: No      Abuse or Trauma History: no    Additional comments/stressors related to family/relationships/peer support: Patient identifies her parents as her support. Patient identifies no as current stressors.    Risk Assessment    Supports: reported to be intact  Risk of harm to self or others: (SI/HI) none noted during evaluation  No HI/SI        Presence of Audio/Visual Hallucinations: no    Access to weapons: not reported during interview.    Observation: This interview only (SW and RD will continue working with the patient throughout the program).     Based on the previous information, the client presents the following risk of harm to self or others: low      Physical/Mental Health Status:     Appearance: appropriate  Sociability: average  Affect: appropriate  Mood: calm  Thought Process: coherent  Speech: normal  Content: no impairment  Orientation: person  Yes , place  Yes , and time  Yes   Insight: emotional  good    BARIATRIC SURGERY EDUCATION CHECKLIST    Patient has received the following education related to the bariatric surgery process and understands:    Patients may be required to complete a psychiatric evaluation and receive clearance for surgery from mental health provider.    Patients who undergo weight loss surgery are at higher risk of increased mental health concerns and suicide attempts.    Patients may be required to complete a full substance abuse evaluation and then complete all treatment recommendations prior to surgery.    If diagnosis of abuse/dependence results, patient may be required to remain sober for one (1) year before having bariatric surgery.    Patients on psychiatric medications should check with their provider to discuss psychiatric medications and the changes in absorption.  Patient should discuss all time release medications with provider and take all  medications as prescribed.    The recommendation is that there is no use of any tobacco products, Hookah or vape's for the bariatric post-operation patient.    Bariatric surgery patients should not consume alcohol as a post-operative patient as it may increase risk of numerous health conditions including but not limited to alcohol abuse and ulcers.    There is a possibility of weight regain if patient does not follow all program guidelines and recommendations.    Bariatric surgery patients should exercise thirty (30) to sixty (60) minutes per day to maintain post-surgical weight loss.    Research indicates that bariatric patients are more successful when they see a therapist for up to two (2) years post-op.    Patients will follow all medical and dietary recommendations provided.    Patient will keep all scheduled appointments and follow up with their physician for a minimum of five (5) years.    Patient will take all vitamins as recommended.  Post-operative vitamins are life-long.    There is a goal month set.  All requirements should be met by this time. Don't wait to get started!    There is a deadline month set.  All requirements must be finished by this time and if not, the patient will be halted in the surgery process. The patient can be referred to the medical weight management program or can come back to the surgical program once the unfinished tasks from the previous program are completed.      Female patients of childbearing years are informed that pregnancy is not recommended until 12 months post-op.      Recommendations: Recommended for surgery  no         Note :     Patient presented for behavioral health evaluation for the bariatric program.  Positive for  Mental Health Dx of Major Depressive dx. Currently in therapy since 2022 Therapist- [facility] in [place]. No hx of  Psychiatry. No Drug and/or Alcohol abuse or treatment.   Tobacco/Vaping History:  [facility] signed policy  Patient agrees to remain nicotine free post-surgery.  Patient educated regarding the impact of nicotine and alcohol on the post-surgery bariatric patient. Patient has no family history of tobacco and alcohol addiction.   Patient will begin making changes with relationship with food through behavior modifications and mindful techniques.  Tracking food intake for one week every three months can assist with weight maintenance and self-accountability for post-surgery success.   and Dietitian follow up visits are available for pre and post-surgery support.  Bariatric support group is available monthly.   Patient verbalized the ability to start making changes and create a healthy relationship around nutrition.  Patient meets criteria for surgery at this time and is referred to the bariatric surgeon.  There are no significant psychological problems identified at this time that would limit the ability of the patient to understand the procedure and comply with any medical and/or surgical recommendations.  Patient does not report having any  psychological co-morbidities that may contribute to the patient's history and inability to lose weight nor is the patient diagnosed with an eating disorder. Patient displays willingness to comply with the preoperative and postoperative treatment plans.         Goal: 1-increase activity level.   Next Appointment: 5/3/24 RD  : Irlanda MALONEWJim

## 2024-03-29 ENCOUNTER — ANNUAL EXAM (OUTPATIENT)
Dept: OBGYN CLINIC | Facility: CLINIC | Age: 34
End: 2024-03-29
Payer: COMMERCIAL

## 2024-03-29 VITALS
SYSTOLIC BLOOD PRESSURE: 112 MMHG | BODY MASS INDEX: 48.82 KG/M2 | HEIGHT: 65 IN | DIASTOLIC BLOOD PRESSURE: 80 MMHG | WEIGHT: 293 LBS

## 2024-03-29 DIAGNOSIS — Z11.3 SCREENING FOR STD (SEXUALLY TRANSMITTED DISEASE): ICD-10-CM

## 2024-03-29 DIAGNOSIS — Z01.419 ENCOUNTER FOR ANNUAL ROUTINE GYNECOLOGICAL EXAMINATION: Primary | ICD-10-CM

## 2024-03-29 PROCEDURE — S0612 ANNUAL GYNECOLOGICAL EXAMINA: HCPCS

## 2024-03-29 NOTE — PROGRESS NOTES
"Assessment/Plan:    Encounter for annual routine gynecological examination  Last pap , due   Sexually active, happy with Paragard.   Current menses, STD screening order placed.  S/p Gardasil vaccine series.   Breast self awareness encouraged.   F/u annually and PRN       Diagnoses and all orders for this visit:    Encounter for annual routine gynecological examination    Screening for STD (sexually transmitted disease)  -     Chlamydia/GC amplified DNA by PCR          Health Maintenance:    Last PAP: 2022. Neg/neg  Next PAP Due: 2027    Gardasil Vaccine Series: She has had the Gardasil series.    Subjective    CC: Yearly Exam     Linda Oneill is a 33 y.o. female  here for an annual exam.        No LMP recorded (lmp unknown). Patient has had an implant.  Sexual activity: She is sexually active   Contraception: Paragard IUD since 02/15/2022   STD testing:  She does want STD testing today.   former smoker, non drinker    No questions or concerns for today's visit. Menstrual cycles are regular with Paragard. She denies any issues with bleeding or her menses. She denies breast concerns, abnormal vaginal discharge, vaginal itching, odor, irritation, bowel/bladder dysfunction, urinary symptoms, pelvic pain, or dyspareunia today.     Family hx of breast cancer: great maternal aunt   Family hx of ovarian cancer: denies   Family hx of colon cancer: denies     Past Medical History:   Diagnosis Date    Bronchitis     Closed disp fx of styloid process of left radius with routine healing     Last Assessed 2017    Depression     HIV (human immunodeficiency virus infection) ([facility])     Obesity, Class III, BMI 40-49.9 (morbid obesity) ([facility])     Psychiatric disorder     \"mood swings\"    Retained intrauterine contraceptive device (IUD) 2/15/2022    Seizure ([facility])     pt denies- blacksout     Past Surgical History:   Procedure Laterality Date     SECTION      IL HYSTEROSCOPY BX " ENDOMETRIUM&/POLYPC W/WO D&C N/A 02/15/2022    Procedure: DILATATION AND CURETTAGE (D&C) WITH HYSTEROSCOPY REMOVAL OF RETAINED IUD ARM;  Surgeon: Janet Millard MD;  Location: [facility];  Service: Gynecology    OR INSERTION INTRAUTERINE DEVICE IUD N/A 02/15/2022    Procedure: INSERTION OF IUD MIRENA;  Surgeon: Janet Millard MD;  Location: [facility];  Service: Gynecology    WISDOM TOOTH EXTRACTION      all 4       Immunization History   Administered Date(s) Administered    COVID-19 J&J ([employer]) vaccine 0.5 mL 2021    DTP 11/10/1992, 1994, 1995, 2014    DTaP 11/10/1992, 1994, 1995    HPV Quadrivalent 2007, 2007, 2008    Hep A, ped/adol, 2 dose 2007, 2008    Hep B, Adolescent or Pediatric 1995, 2007, 2008    Hepatitis A 2007, 2008    INFLUENZA 2007, 2008, 2012, 2013, 10/23/2014, 2015, 2015, 10/11/2016, 10/11/2016    IPV 11/10/1992, 1994, 1995    Influenza Quadrivalent Preservative Free 3 years and older IM 2016, 2017    Influenza, injectable, quadrivalent, preservative free 0.5 mL 2019, 2019, 2020, 11/10/2021, 2024    MMR 1992, 1994    Meningococcal MCV4P 2007    Rho (D) Immune Globulin 2014, 2014    Tdap 2007, 2015, 07/10/2021    Tuberculin Skin Test-PPD Intradermal 2007    Varicella 1992, 1994       Family History   Problem Relation Age of Onset    Hypertension Mother     Deep vein thrombosis Father     Obesity Daughter     Breast cancer Neg Hx     Ovarian cancer Neg Hx     Uterine cancer Neg Hx     Cervical cancer Neg Hx      Social History     Tobacco Use    Smoking status: Former     Current packs/day: 0.00     Types: Cigarettes     Quit date: 2010     Years since quittin.2    Smokeless tobacco: Current   Vaping Use    Vaping status: Some Days     "Substances: Nicotine, Flavoring   Substance Use Topics    Alcohol use: Not Currently     Comment: rarely    Drug use: No       Current Outpatient Medications:     multivitamin (THERAGRAN) TABS, Take 1 tablet by mouth daily, Disp: , Rfl:     sertraline (ZOLOFT) 100 mg tablet, TAKE 1.5 TABLETS (150 MG TOTAL) BY MOUTH DAILY, Disp: 135 tablet, Rfl: 0  Patient Active Problem List    Diagnosis Date Noted    Encounter for annual routine gynecological examination 2024    Depression, recurrent ([facility]) 2022    Primary insomnia 2021    Class 3 severe obesity due to excess calories without serious comorbidity with body mass index (BMI) of 45.0 to 49.9 in adult ([facility]) 2021    Depression, major, recurrent, moderate ([facility]) 2017    Family history of ischemic heart disease (IHD) 06/10/2011       No Known Allergies    OB History    Para Term  AB Living   1 1 1     2   SAB IAB Ectopic Multiple Live Births         1 2      # Outcome Date GA Lbr Chito/2nd Weight Sex Delivery Anes PTL Lv   1A Term 02/02/15 37w4d  2892 g (6 lb 6 oz) F Vag-Spont         Birth Comments: twins, pp depression   1B Term 02/02/15 39w4d  2410 g (5 lb 5 oz) F CS-LTranv         Birth Comments: emergent csection, bradycardia, failed breech extraction.  baby needed head cooling      Obstetric Comments   Menarche:        Vitals:    24 0942   BP: 112/80   BP Location: Left arm   Patient Position: Sitting   Cuff Size: Standard   Weight: 135 kg (298 lb 9.6 oz)   Height: 5' 5\" (1.651 m)     Body mass index is 49.69 kg/m².    Review of Systems   Constitutional:  Negative for chills and fever.   Gastrointestinal:  Negative for abdominal pain, constipation, diarrhea, nausea and vomiting.   Genitourinary:  Negative for difficulty urinating, dyspareunia, dysuria, frequency, menstrual problem, pelvic pain, urgency, vaginal bleeding, vaginal discharge and vaginal pain.   Musculoskeletal:  Negative for back pain and myalgias.   Skin:  " Negative for pallor and rash.   Neurological:  Negative for headaches.   Hematological:  Negative for adenopathy.   Psychiatric/Behavioral:  Negative for dysphoric mood.         Physical Exam  Constitutional:       General: She is not in acute distress.     Appearance: Normal appearance. She is not ill-appearing.   Genitourinary:      No lesions in the vagina.      Right Labia: No rash, tenderness, lesions or skin changes.     Left Labia: No tenderness, lesions, skin changes or rash.     No inguinal adenopathy present in the right or left side.     Vaginal bleeding (menses) present.      No vaginal discharge, erythema, tenderness or ulceration.        Right Adnexa: not palpable.     Left Adnexa: not palpable.     Cervix is parous.      No cervical motion tenderness, discharge, friability, lesion, polyp or nabothian cyst.      IUD strings visualized.      Uterus is not prolapsed.      Uterus exam comments: Exam limited due to body habitus.      No urethral prolapse, tenderness or discharge present.      Bladder is not tender and urgency on palpation not present.       Pelvic exam was performed with patient in the lithotomy position.   Breasts:     Right: No swelling, inverted nipple, mass, nipple discharge, skin change or tenderness.      Left: No swelling, inverted nipple, mass, nipple discharge, skin change or tenderness.   HENT:      Head: Normocephalic and atraumatic.   Eyes:      Conjunctiva/sclera: Conjunctivae normal.   Pulmonary:      Effort: Pulmonary effort is normal.   Abdominal:      General: There is no distension.      Palpations: Abdomen is soft.      Tenderness: There is no abdominal tenderness.   Musculoskeletal:         General: Normal range of motion.      Cervical back: Neck supple.   Lymphadenopathy:      Upper Body:      Right upper body: No supraclavicular or axillary adenopathy.      Left upper body: No supraclavicular or axillary adenopathy.      Lower Body: No right inguinal adenopathy. No  left inguinal adenopathy.   Neurological:      Mental Status: She is alert and oriented to person, place, and time.   Skin:     General: Skin is warm and dry.   Psychiatric:         Mood and Affect: Mood normal.         Behavior: Behavior normal.         Thought Content: Thought content normal.         Judgment: Judgment normal.   Vitals and nursing note reviewed.

## 2024-03-29 NOTE — ASSESSMENT & PLAN NOTE
Last pap 2022, due 2025  Sexually active, happy with Paragard.   Current menses, STD screening order placed.  S/p Gardasil vaccine series.   Breast self awareness encouraged.   F/u annually and PRN

## 2024-04-01 ENCOUNTER — CLINICAL SUPPORT (OUTPATIENT)
Dept: BARIATRICS | Facility: CLINIC | Age: 34
End: 2024-04-01

## 2024-04-01 VITALS — HEIGHT: 66 IN | WEIGHT: 293 LBS | BODY MASS INDEX: 47.09 KG/M2

## 2024-04-01 DIAGNOSIS — R63.5 ABNORMAL WEIGHT GAIN: ICD-10-CM

## 2024-04-01 DIAGNOSIS — E66.01 OBESITY, CLASS III, BMI 40-49.9 (MORBID OBESITY) (HCC): Primary | ICD-10-CM

## 2024-04-01 DIAGNOSIS — Z01.818 PREOPERATIVE CLEARANCE: ICD-10-CM

## 2024-04-01 DIAGNOSIS — R73.01 ELEVATED FASTING BLOOD SUGAR: ICD-10-CM

## 2024-04-01 DIAGNOSIS — Z72.0 VAPES NICOTINE CONTAINING SUBSTANCE: ICD-10-CM

## 2024-04-01 DIAGNOSIS — E66.01 CLASS 3 SEVERE OBESITY DUE TO EXCESS CALORIES WITHOUT SERIOUS COMORBIDITY WITH BODY MASS INDEX (BMI) OF 45.0 TO 49.9 IN ADULT (HCC): Primary | ICD-10-CM

## 2024-04-01 DIAGNOSIS — Z01.812 BLOOD TESTS PRIOR TO TREATMENT OR PROCEDURE: ICD-10-CM

## 2024-04-01 PROCEDURE — RECHECK

## 2024-04-01 PROCEDURE — RECHECK: Performed by: DIETITIAN, REGISTERED

## 2024-04-01 NOTE — PROGRESS NOTES
"Bariatric Nutrition Assessment Note    Type of surgery    Preop no months required  Surgery Date: Tentative August-September 2024  Deadline December 2024  Surgeon: Dr. Yanna Gaines    Nutrition Assessment   Linda Oneill  33 y.o.  female   Wt with BMI of 25: 152.57lbs  Pre-Op Excess Wt: 146.23lbs  Ht 5' 5.5\" (1.664 m)   Wt 136 kg (298 lb 12.8 oz)   LMP  (LMP Unknown) Comment: Paragard  BMI 48.97 kg/m²     Score cannot be calculated. In patient's <= 35 years old, there is poor performance in regards to estimating degree of fibrosis.    Jj Taylor Equation:    BMR= 2072kcal  Weight Maintenance: 2486kcal  Estimated calories for weight loss 1486-1986kcal ( 1-2# per wk wt loss - sedentary )  Estimated protein needs 69.35-104g/day (1.0-1.5 gms/kg IBW )   Estimated fluid needs 2081-2427ml (30-35 ml/kg IBW )      Weight History   Onset of Obesity: Adult  Family history of obesity: Yes: father, mother, brother, sister all had WLS at [facility]- all doing well  Wt Loss Attempts: Commercial Programs (Weight Watchers, knowNormal, etc.)  Counseling with  MD  Meal Replacements (Medifast, Slim Fast, etc.)  Nutrition Counseling with RD  Self Created Diets (Portion Control, Healthy Food Choices, etc.)  Patient has tried the above for 6 months or more with insufficient weight loss or weight regain, which is why patient has requested to be evaluated for weight loss surgery today  Maximum Wt Lost: 60lbs on nutrisystem in 2009  Pt had previously worked with [facility] surgical weight loss program from March 2021 to June 2022, then worked with [facility] MWM program from July 2022 to February 2023    Review of History and Medications   Past Medical History:   Diagnosis Date    Bronchitis     Closed disp fx of styloid process of left radius with routine healing     Last Assessed 12Mar2017    Depression     HIV (human immunodeficiency virus infection) ([facility])     Obesity, Class III, BMI 40-49.9 (morbid obesity) ([facility])     Psychiatric disorder " "    \"mood swings\"    Retained intrauterine contraceptive device (IUD) 2/15/2022    Seizure ([facility])     pt denies- blacksout     Past Surgical History:   Procedure Laterality Date     SECTION      NE HYSTEROSCOPY BX ENDOMETRIUM&/POLYPC W/WO D&C N/A 02/15/2022    Procedure: DILATATION AND CURETTAGE (D&C) WITH HYSTEROSCOPY REMOVAL OF RETAINED IUD ARM;  Surgeon: Janet Millard MD;  [facility];  Service: Gynecology    NE INSERTION INTRAUTERINE DEVICE IUD N/A 02/15/2022    Procedure: INSERTION OF IUD MIRENA;  Surgeon: Janet Millard MD;  Location: [facility];  Service: Gynecology    WISDOM TOOTH EXTRACTION      all 4     Social History     Socioeconomic History    Marital status: Single     Spouse name: Not on file    Number of children: Not on file    Years of education: Not on file    Highest education level: Not on file   Occupational History    Not on file   Tobacco Use    Smoking status: Former     Current packs/day: 0.00     Types: Cigarettes     Quit date: 2010     Years since quittin.2    Smokeless tobacco: Current   Vaping Use    Vaping status: Some Days    Substances: Nicotine, Flavoring   Substance and Sexual Activity    Alcohol use: Not Currently     Comment: rarely    Drug use: No    Sexual activity: Yes     Birth control/protection: I.U.D., None     Comment: Paraguard   Other Topics Concern    Not on file   Social History Narrative    Not on file     Social Determinants of Health     Financial Resource Strain: Not on file   Food Insecurity: Not on file   Transportation Needs: Not on file   Physical Activity: Not on file   Stress: Not on file   Social Connections: Not on file   Intimate Partner Violence: Not on file   Housing Stability: Not on file       Current Outpatient Medications:     multivitamin (THERAGRAN) TABS, Take 1 tablet by mouth daily, Disp: , Rfl:     sertraline (ZOLOFT) 100 mg tablet, TAKE 1.5 TABLETS (150 MG TOTAL) BY MOUTH DAILY, Disp: 135 tablet, " Rfl: 0    Food Intake and Lifestyle Assessment   Food Intake Assessment completed via usual diet recall  Breakfast: bagel with cream cheese and jelly, coffee-32oz with sweetened creamer: hazelnut or caramel macchiato  Snack: orange, grapes, banana, etc  Lunch: pre-made bag of salad with chicken  Snack: none  Sleeps 30-60 minutes  Dinner: pt cooks: 5-6pm: pasta with chopped meat, tacos on tuesday  Snack: piece of cake or pie or chips  Beverage intake: water, crystal light and coffee  Protein supplement: none  Estimated protein intake per day: 30-60g  Estimated fluid intake per day: >64oz  Meals eaten away from home: 1 per week  Typical meal pattern: 2-3 meals per day and 0-2 snacks per day  Eating Behaviors: Consumption of high calorie/ high fat foods, Consumption of high calorie beverages, Frequent snacking/ grazing, and Craves sweet foods  Food allergies or intolerances: No Known Allergies NKFA  Cultural or Judaism considerations: dislikes seafood    Physical Assessment  Physical Activity  Types of exercise: None-house chores  Current physical limitations: none    Psychosocial Assessment   Support systems: lives with her two 10yo twin girls  Lives on same property as her family  Socioeconomic factors: not currently working, twins in school  Does not receive food stamps, WIC, etc    Nutrition Diagnosis  Diagnosis: Overweight / Obesity (NC-3.3), Excessive energy intake (NI-1.5), Inappropriate intake of carbohydrates (NI-5.8.3), and Undesirable food choices (NB-1.7)  Related to: Food and nutrition-related knowledge deficit, Physical inactivity, Excessive energy intake, and Inability or lack of desire to manage self-care  As Evidenced by: BMI >25, Excessive energy intake, and Unintentional weight gain     Nutrition Prescription: Recommend the following diet  Low fat, Low sugar, High protein, and Regular    Interventions and Teaching   Discussed pre-op and post-op nutrition guidelines.       Patient educated and  "handouts provided.  Surgical changes to stomach / GI  Capacity of post-surgery stomach  Diet progression  Adequate hydration  Sugar and fat restriction to decrease \"dumping syndrome\"  Weight loss plateaus/ possibility of weight regain  Exercise  Suggestions for pre-op diet  Nutrition considerations after surgery  Protein supplements  Meal planning and preparation  Dietary and lifestyle changes  Possible problems with poor eating habits  Techniques for self monitoring and keeping daily food journal  Potential for food intolerance after surgery, and ways to deal with them including: lactose intolerance, nausea, reflux, vomiting, diarrhea, food intolerance, appetite changes, gas  Vitamin / Mineral supplementation of Multivitamin with minerals and Vitamin D  Gummy womens mvi    Patient is not currently pregnant and doesn't desire to become pregnant a minimum of one year post-op    Education provided to: patient and pt's father Donal, who had RNY with Dr Jose L Gaines October 2022    Barriers to learning: Desire/Motivation    Readiness to change: preparation    Prior research on procedure: discussed with provider and friends or family    Comprehension: needs reinforcement     Expected Compliance: fair    Recommendations  Pt is an appropriate candidate for surgery. Yes    Score cannot be calculated. In patient's <= 35 years old, there is poor performance in regards to estimating degree of fibrosis.    Vapes with nicotine.  Nicotine blood test ordered to be done no later than 7/1/24 to remain in surgical program.    3/20/2024: CBC, CMP, Lipid panel, TSH done.  Elevated fasting glucose.  HgbA1c ordered today.  CBC+CMP good until 9/19/24    Minimum BMI of 40= 244.11    Evaluation / Monitoring  Dietitian to Monitor: Eating pattern as discussed Tolerance of nutrition prescription Body weight Lab values Physical activity Bowel pattern    Goals  Eliminate sugar sweetened beverages, Food journal, Exercise 30 minutes 5 times per " week, Complete lession plans 1-6, Eat 3 meals per day, and Eliminate mindless snacking    Time Spent:   1 Hour

## 2024-04-02 ENCOUNTER — TELEPHONE (OUTPATIENT)
Dept: PSYCHIATRY | Facility: CLINIC | Age: 34
End: 2024-04-02

## 2024-04-02 NOTE — TELEPHONE ENCOUNTER
Bariatric Surgery Program Form was received to be fill out by Dafne Boyle. Form was sent to Dafne via email.

## 2024-05-01 ENCOUNTER — APPOINTMENT (OUTPATIENT)
Dept: LAB | Facility: HOSPITAL | Age: 34
End: 2024-05-01
Attending: SURGERY
Payer: COMMERCIAL

## 2024-05-01 DIAGNOSIS — Z72.0 VAPES NICOTINE CONTAINING SUBSTANCE: ICD-10-CM

## 2024-05-01 DIAGNOSIS — E66.01 CLASS 3 SEVERE OBESITY DUE TO EXCESS CALORIES WITHOUT SERIOUS COMORBIDITY WITH BODY MASS INDEX (BMI) OF 45.0 TO 49.9 IN ADULT (HCC): ICD-10-CM

## 2024-05-01 DIAGNOSIS — R63.5 ABNORMAL WEIGHT GAIN: ICD-10-CM

## 2024-05-01 DIAGNOSIS — R73.01 ELEVATED FASTING BLOOD SUGAR: ICD-10-CM

## 2024-05-01 DIAGNOSIS — Z01.812 BLOOD TESTS PRIOR TO TREATMENT OR PROCEDURE: ICD-10-CM

## 2024-05-01 DIAGNOSIS — Z01.818 PREOPERATIVE CLEARANCE: ICD-10-CM

## 2024-05-01 PROBLEM — Z01.419 ENCOUNTER FOR ANNUAL ROUTINE GYNECOLOGICAL EXAMINATION: Status: RESOLVED | Noted: 2024-03-29 | Resolved: 2024-05-01

## 2024-05-01 LAB
EST. AVERAGE GLUCOSE BLD GHB EST-MCNC: 123 MG/DL
HBA1C MFR BLD: 5.9 %

## 2024-05-01 PROCEDURE — 80323 ALKALOIDS NOS: CPT

## 2024-05-01 PROCEDURE — G0480 DRUG TEST DEF 1-7 CLASSES: HCPCS

## 2024-05-01 PROCEDURE — 36415 COLL VENOUS BLD VENIPUNCTURE: CPT

## 2024-05-01 PROCEDURE — 83036 HEMOGLOBIN GLYCOSYLATED A1C: CPT

## 2024-05-01 NOTE — PROGRESS NOTES
"Bariatric Nutrition Assessment Note    Type of surgery    Preop no months required: today is month 1  Surgery Date: Tentative August-September 2024  Deadline December 2024  Surgeon: Dr. Yanna Gaines    Nutrition Assessment   Linda Oneill  33 y.o.  female   4/1/2024 initial evaluation: 298.8lbs  Wt with BMI of 25: 152.57lbs  Pre-Op Excess Wt: 146.23lbs  Wt (!) 137 kg (302 lb)   BMI 49.49 kg/m²     Score cannot be calculated. In patient's <= 35 years old, there is poor performance in regards to estimating degree of fibrosis.    Kelley- . Tasha Equation:    BMR= 2072kcal  Weight Maintenance: 2486kcal  Estimated calories for weight loss 1486-1986kcal ( 1-2# per wk wt loss - sedentary )  Estimated protein needs 69.35-104g/day (1.0-1.5 gms/kg IBW )   Estimated fluid needs 2081-2427ml (30-35 ml/kg IBW )      Weight History   Onset of Obesity: Adult  Family history of obesity: Yes: father, mother, brother, sister all had WLS at [facility]- all doing well  Wt Loss Attempts: Commercial Programs (Weight Watchers, Funinhand, etc.)  Counseling with  MD  Meal Replacements (Medifast, Slim Fast, etc.)  Nutrition Counseling with RD  Self Created Diets (Portion Control, Healthy Food Choices, etc.)  Patient has tried the above for 6 months or more with insufficient weight loss or weight regain, which is why patient has requested to be evaluated for weight loss surgery today  Maximum Wt Lost: 60lbs on nutrisystem in 2009  Pt had previously worked with [facility] surgical weight loss program from March 2021 to June 2022, then worked with [facility] MWM program from July 2022 to February 2023    Review of History and Medications   Past Medical History:   Diagnosis Date    Bronchitis     Closed disp fx of styloid process of left radius with routine healing     Last Assessed 12Mar2017    Depression     HIV (human immunodeficiency virus infection) ([facility])     Obesity, Class III, BMI 40-49.9 (morbid obesity) ([facility])     Psychiatric disorder     \"mood " "swings\"    Retained intrauterine contraceptive device (IUD) 2/15/2022    Seizure (HCC)     pt denies- blacksout     Past Surgical History:   Procedure Laterality Date     SECTION      WA HYSTEROSCOPY BX ENDOMETRIUM&/POLYPC W/WO D&C N/A 02/15/2022    Procedure: DILATATION AND CURETTAGE (D&C) WITH HYSTEROSCOPY REMOVAL OF RETAINED IUD ARM;  Surgeon: Janet Millard MD;  Location: [facility];  Service: Gynecology    WA INSERTION INTRAUTERINE DEVICE IUD N/A 02/15/2022    Procedure: INSERTION OF IUD MIRENA;  Surgeon: Janet Millard MD;  [facility]: [facility];  Service: Gynecology    WISDOM TOOTH EXTRACTION      all 4     Social History     Socioeconomic History    Marital status: Single     Spouse name: Not on file    Number of children: Not on file    Years of education: Not on file    Highest education level: Not on file   Occupational History    Not on file   Tobacco Use    Smoking status: Former     Current packs/day: 0.00     Types: Cigarettes     Quit date: 2010     Years since quittin.3    Smokeless tobacco: Current   Vaping Use    Vaping status: Some Days    Substances: Nicotine, Flavoring   Substance and Sexual Activity    Alcohol use: Not Currently     Comment: rarely    Drug use: No    Sexual activity: Yes     Birth control/protection: I.U.D., None     Comment: Paraguard   Other Topics Concern    Not on file   Social History Narrative    Not on file     Social Determinants of Health     Financial Resource Strain: Not on file   Food Insecurity: Not on file   Transportation Needs: Not on file   Physical Activity: Not on file   Stress: Not on file   Social Connections: Not on file   Intimate Partner Violence: Not on file   Housing Stability: Not on file       Current Outpatient Medications:     multivitamin (THERAGRAN) TABS, Take 1 tablet by mouth daily, Disp: , Rfl:     sertraline (ZOLOFT) 100 mg tablet, TAKE 1.5 TABLETS (150 MG TOTAL) BY MOUTH DAILY, Disp: 135 tablet, Rfl: " 0    Food Intake and Lifestyle Assessment   Food Intake Assessment completed via usual diet recall  Pt did not bring bariatric manual to appointment today, when asked where her book was pt looked confused.  Reminded pt that she was given a book at her evaluation last month.  Pt states she has not had time to open it yet as she has been busy with her kids and cleaning the house.  Takes kids to bus stop at 8:30am  Kids get home from school at 4:40pm  Pt reports between then she is busy cleaning the house.  Pt has not yet downloaded the [employer] dayanna or started food journaling:  Usual diet recall:  Breakfast: 9am: cup coffee, frozen breakfast sandwich or one bowl of cereal: honey nut cheerios with whole milk, coffee-32oz with sweetened creamer: hazelnut or caramel macchiato  Snack: orange, grapes, banana, etc-pt denies snacking today  Lunch: 12:30-1pm: cooks chicken in pan with butter about 3oz, in pre-made salad kit  Snack: none  Dinner: pt cooks: 5-6pm: pasta with chopped meat or meat loaf with mashed potatoes and broccoli  Snack: Pt reports she is trying to make healthier snack choices: popcorn or pretzels with PB or apple with PB  Beverage intake: water, crystal light and coffee  Protein supplement: none  Estimated protein intake per day: 30-60g  Estimated fluid intake per day: >64oz  Meals eaten away from home: 1 per week  Typical meal pattern: 2-3 meals per day and 0-2 snacks per day  Eating Behaviors: Consumption of high calorie/ high fat foods, Consumption of high calorie beverages, Frequent snacking/ grazing, and Craves sweet foods  Food allergies or intolerances: No Known Allergies FA  Cultural or Presybeterian considerations: dislikes seafood    Physical Assessment  Physical Activity  Types of exercise: None-house chores  Current physical limitations: none    Psychosocial Assessment   Support systems: lives with her two 10yo twin girls  Lives on same property as her family  Socioeconomic factors: not currently  working, twins in school during the day  Does not receive food stamps, WIC, etc    Nutrition Diagnosis-continued  Diagnosis: Overweight / Obesity (NC-3.3), Excessive energy intake (NI-1.5), Inappropriate intake of carbohydrates (NI-5.8.3), and Undesirable food choices (NB-1.7)  Related to: Food and nutrition-related knowledge deficit, Physical inactivity, Excessive energy intake, and Inability or lack of desire to manage self-care  As Evidenced by: BMI >25, Excessive energy intake, and Unintentional weight gain     Nutrition Prescription: Recommend the following diet  Low fat, Low sugar, High protein, and Regular    Interventions and Teaching   Discussed pre-op and post-op nutrition guidelines.       Patient educated and handouts provided.  Adequate hydration  Suggestions for pre-op diet  Nutrition considerations after surgery  Protein supplements  Meal planning and preparation  Dietary and lifestyle changes  Possible problems with poor eating habits  Techniques for self monitoring and keeping daily food journal  Potential for food intolerance after surgery, and ways to deal with them including: lactose intolerance, nausea, reflux, vomiting, diarrhea, food intolerance, appetite changes, gas  Vitamin / Mineral supplementation of Multivitamin with minerals and Vitamin D  Pt reports she has been consistent with her gummy womens mvi daily    Patient is not currently pregnant and doesn't desire to become pregnant a minimum of one year post-op    Education provided to: patient    Barriers to learning: Desire/Motivation    Readiness to change: contemplation/preparation    Prior research on procedure: discussed with provider and friends or family    Comprehension: needs reinforcement     Expected Compliance: fair    Recommendations  Pt is an appropriate candidate for surgery. Yes    Minimum BMI of 40= 244.11    Evaluation / Monitoring  Dietitian to Monitor: Eating pattern as discussed Tolerance of nutrition prescription  Body weight Lab values Physical activity Bowel pattern    Goals  Eliminate sugar sweetened beverages, Food journal, Exercise 30 minutes 5 times per week, Complete lession plans 1-6, Eat 3 meals per day, and Eliminate mindless snacking  Provided pt with specific written goals to complete between today and next month's visit:  1)  Finish reading chapter two in her bariatric book.  2)  Make sure psychiatrist send completed clearance forms back to her SW.    Workflow: (Incomplete in Bold):  Psych and/or D+A Clearance: will need psy clearance prior to seeing surgeon per request of H.C.  Blood Work: 3/20/2024: CBC, CMP, Lipid panel, TSH done.  Elevated fasting glucose.  HgbA1c done 5/1/24: 5.9%.  CBC+CMP good until 9/19/24  PCP letter: done 2/26/24  Surgeon Appt: pending negative nicotine test and psych clearance  EGD: pending surgeon consult  Cardiac Risk Assessment with ECG: has appt 5/22/24  Sleep Studies: negative stopbang  Nicotine test: drawn 5/1/24- results pending  Pre-Operative Program: N/A  NAFLD Score Calculated: Score cannot be calculated. In patient's <= 35 years old, there is poor performance in regards to estimating degree of fibrosis.  Hepatology consult: N/A    Time Spent:   30 minutes

## 2024-05-02 LAB
C TRACH DNA SPEC QL NAA+PROBE: NEGATIVE
N GONORRHOEA DNA SPEC QL NAA+PROBE: NEGATIVE

## 2024-05-03 ENCOUNTER — CLINICAL SUPPORT (OUTPATIENT)
Dept: BARIATRICS | Facility: CLINIC | Age: 34
End: 2024-05-03

## 2024-05-03 ENCOUNTER — TELEPHONE (OUTPATIENT)
Dept: PSYCHIATRY | Facility: CLINIC | Age: 34
End: 2024-05-03

## 2024-05-03 VITALS — WEIGHT: 293 LBS | BODY MASS INDEX: 49.49 KG/M2

## 2024-05-03 DIAGNOSIS — E66.01 CLASS 3 SEVERE OBESITY DUE TO EXCESS CALORIES WITHOUT SERIOUS COMORBIDITY WITH BODY MASS INDEX (BMI) OF 45.0 TO 49.9 IN ADULT (HCC): Primary | ICD-10-CM

## 2024-05-03 PROCEDURE — RECHECK: Performed by: DIETITIAN, REGISTERED

## 2024-05-03 NOTE — TELEPHONE ENCOUNTER
Was speaking with patient an she requested to schedule talk therapy appointment. Scheduled virtually 5/15 9AM.

## 2024-05-05 LAB
COTININE SERPL-MCNC: 325.2 NG/ML
NICOTINE SERPL-MCNC: 11.9 NG/ML

## 2024-05-15 ENCOUNTER — TELEMEDICINE (OUTPATIENT)
Dept: BEHAVIORAL/MENTAL HEALTH CLINIC | Facility: CLINIC | Age: 34
End: 2024-05-15

## 2024-05-15 DIAGNOSIS — F33.1 DEPRESSION, MAJOR, RECURRENT, MODERATE (HCC): Primary | ICD-10-CM

## 2024-05-15 NOTE — BH CRISIS PLAN
Client Name: Linda Oneill       Client YOB: 1990    [place] Safety Plan    Creation Date: 5/15/24 Update Date: 5/14/25   Created By: Dafne Boyle       Step 1: Warning Signs:   Warning Signs   sleep more   social isolation            Step 2: Internal Coping Strategies:   Internal Coping Strategies   Cleaning   exercie   take a nap            Step 3: People and social settings that provide distraction:   Name Contact Information   mom call/text   Dad call/text    Places   outside   bedroom         Step 4: People whom I can ask for help during a crisis:    Name Contact Information    Mom call/text    Dad call/text      Step 5: Professionals or agencies I can contact during a crisis:    Clinican/Agency Name Phone Emergency Contact    [facility] [phone]       Crisis Phone Numbers:   Suicide Prevention Lifeline: Call or Text  952 Crisis Text Line: Text HOME to 302-928   Please note: Some [facility] do not have a separate number for Child/Adolescent specific crisis. If your county is not listed under Child/Adolescent, please call the adult number for your county      Adult Crisis Numbers: Child/Adolescent Crisis Numbers   [facility]: [phone] [facility]: [phone]   [facility]: [phone] [facility]: [phone]   [facility]: [phone] [place], [place]: [phone]   [facility]: [phone] [facility]: [phone]   [facility]: [phone]   [facility]: [phone]   [facility]: [phone]   [facility]: [phone] (daytime) [phone] (after hours, weekends, holidays)      Step 6: Making the environment safer (plan for lethal means safety):      Optional: What is most important to me and worth living for?   My children and my family     [place] Safety Plan. Raquel Bell and Harjit Rock. Used with permission of the authors.

## 2024-05-15 NOTE — BH TREATMENT PLAN
Outpatient Behavioral Health Psychotherapy Treatment Plan     Linda Oneill  1990      Date of Initial Psychotherapy Assessment: 2/14/22  Date of Current Treatment Plan: 5/15/24  Treatment Plan Target Date: 10/11/24  Treatment Plan Expiration Date: 11/11/24     Diagnosis:   1. Depression, major, recurrent, moderate (HCC)                Area(s) of Need: healthy communication, decrease depression.     Long Term Goal 1 (in the client's own words): Decrease depressive symptoms     Stage of Change: Preparation     Target Date for completion: 11/11/24             Anticipated therapeutic modalities: Client centered, CBT, supportive psychotherapy             People identified to complete this goal: Marino                    Objective 1: (identify the means of measuring success in meeting the objective): Work on engaging in hobbies and activities she enjoys to increase mood.                    Objective 2: (identify the means of measuring success in meeting the objective): Teach and practice coping skills to be utilized to help manage emotions.      Long Term Goal 2 (in the client's own words): healthy communication skills     Stage of Change: Action     Target Date for completion: 11/11/24             Anticipated therapeutic modalities: Client Centered, supportive psychotherapy, solution focused.             People identified to complete this goal: Marino                    Objective 1: (identify the means of measuring success in meeting the objective): Teach assertive communication skills to be used to express emotions to others.                     Objective 2: (identify the means of measuring success in meeting the objective): Teach differences between assertive, passive aggressive and aggressive communication.        I am currently under the care of a [facility] psychiatric provider: no     My [facility] psychiatric provider is: n/a     I am currently taking psychiatric medications:  Yes, as prescribed     I feel that I will be ready for discharge from mental health care when I reach the following (measurable goal/objective): To able to manage my emotions and communicate needs in a healthy way.     For children and adults who have a legal guardian:          Has there been any change to custody orders and/or guardianship status? NA. If yes, attach updated documentation.     I have updated my Crisis Plan and have been offered a copy of this plan     Behavioral Health Treatment Plan [facility]: Diagnosis and Treatment Plan explained to Linda Oneill acknowledges an understanding of their diagnosis. Linda Oneill agrees to this treatment plan.     I have been offered a copy of this Treatment Plan. Yes    Linda Oneill, 1990, actively participated in the review and update of this treatment plan during a virtual session, using the Epic Embedded platform.   Linda Oneill  provided verbal consent on 5/15/2024 at 9:30 AM. The treatment plan was transcribed into the Electronic Health Record at a later time.

## 2024-05-15 NOTE — PSYCH
"  Virtual Regular Visit    Verification of patient location:    Patient is located at Home in the following state in which I hold an active license [place]      Assessment/Plan:    Problem List Items Addressed This Visit     Depression, major, recurrent, moderate (HCC) - Primary       Goals addressed in session: Goal 1 and Goal 2          Reason for visit is No chief complaint on file.       Encounter provider Dafne Boyle      Recent Visits  No visits were found meeting these conditions.  Showing recent visits within past 7 days and meeting all other requirements  Today's Visits  Date Type Provider Dept   05/15/24 Telemedicine Dafne Boyle [facility] Therapist [place]   Showing today's visits and meeting all other requirements  Future Appointments  No visits were found meeting these conditions.  Showing future appointments within next 150 days and meeting all other requirements       The patient was identified by name and date of birth. Linda Oneill was informed that this is a telemedicine visit and that the visit is being conducted throughthe Epic Embedded platform. She agrees to proceed..  My office door was closed. No one else was in the room.  She acknowledged consent and understanding of privacy and security of the video platform. The patient has agreed to participate and understands they can discontinue the visit at any time.    Patient is aware this is a billable service.     Subjective  Linda Oneill is a 33 y.o. female  .      HPI     Past Medical History:   Diagnosis Date   • Bronchitis    • Closed disp fx of styloid process of left radius with routine healing     Last Assessed 12Mar2017   • Depression    • HIV (human immunodeficiency virus infection) ([facility])    • Obesity, Class III, BMI 40-49.9 (morbid obesity) ([facility])    • Psychiatric disorder     \"mood swings\"   • Retained intrauterine contraceptive device (IUD) 2/15/2022   • Seizure ([facility])     pt denies- blacksout       Past Surgical " History:   Procedure Laterality Date   •  SECTION     • [place] HYSTEROSCOPY BX ENDOMETRIUM&/POLYPC W/WO D&C N/A 02/15/2022    Procedure: DILATATION AND CURETTAGE (D&C) WITH HYSTEROSCOPY REMOVAL OF RETAINED IUD ARM;  Surgeon: Janet Millard MD;  [facility];  Service: Gynecology   • HI INSERTION INTRAUTERINE DEVICE IUD N/A 02/15/2022    Procedure: INSERTION OF IUD MIRENA;  Surgeon: Janet Millard MD;  [facility]: [facility];  Service: Gynecology   • WISDOM TOOTH EXTRACTION      all 4       Current Outpatient Medications   Medication Sig Dispense Refill   • multivitamin (THERAGRAN) TABS Take 1 tablet by mouth daily     • sertraline (ZOLOFT) 100 mg tablet TAKE 1.5 TABLETS (150 MG TOTAL) BY MOUTH DAILY 135 tablet 0     No current facility-administered medications for this visit.        No Known Allergies    Review of Systems    Video Exam    There were no vitals filed for this visit.    Physical Exam     Behavioral Health Psychotherapy Progress Note    Psychotherapy Provided: Individual Psychotherapy     1. Depression, major, recurrent, moderate (HCC)            Goals addressed in session: Goal 1 and Goal 2     DATA: Clinician met with Linda via telehealth for individual therapy. Linda denied any recurrent symptoms of depression and overall reports mood has been stable. 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 Clinician explored possible solutions and resources to use to assist with paperwork. Linda reports plans to follow through with bariatric surgery in the future and denies success with exercise and diet to lose weight.  Clinician completed treatment plan updated an safety plan during the session.  During this session, this clinician used the following therapeutic modalities: Client-centered Therapy and Supportive Psychotherapy    Substance Abuse was not addressed  "during this session. If the client is diagnosed with a co-occurring substance use disorder, please indicate any changes in the frequency or amount of use: n/a. Stage of change for addressing substance use diagnoses: No substance use/Not applicable    ASSESSMENT:  Linda Oneill presents with a Euthymic/ normal mood.     her affect is Normal range and intensity, which is congruent, with her mood and the content of the session. The client has made progress on their goals.    Linda was open and engaged in the session.  Linda Oneill presents with a none risk of suicide, none risk of self-harm, and none risk of harm to others.    For any risk assessment that surpasses a \"low\" rating, a safety plan must be developed.    A safety plan was indicated: no  If yes, describe in detail n/a    PLAN: Between sessions, Linda Oneill will utilize known social supports . At the next session, the therapist will use Client-centered Therapy, Solution-Focused Therapy, and Supportive Psychotherapy to address anxiety. Linda plans to call for a follow up if need but at this time does not feel she needs consistent therapy.     Behavioral Health Treatment Plan and Discharge Planning: Linda Oneill is aware of and agrees to continue to work on their treatment plan. They have identified and are working toward their discharge goals. yes    Visit start and stop times:    05/15/24  Start Time: 0909  Stop Time: 0932  Total Visit Time: 23 minutes            "

## 2024-05-22 ENCOUNTER — OFFICE VISIT (OUTPATIENT)
Dept: CARDIOLOGY CLINIC | Facility: CLINIC | Age: 34
End: 2024-05-22
Payer: COMMERCIAL

## 2024-05-22 VITALS
RESPIRATION RATE: 16 BRPM | BODY MASS INDEX: 47.09 KG/M2 | DIASTOLIC BLOOD PRESSURE: 80 MMHG | HEART RATE: 72 BPM | HEIGHT: 66 IN | SYSTOLIC BLOOD PRESSURE: 120 MMHG | WEIGHT: 293 LBS | OXYGEN SATURATION: 98 %

## 2024-05-22 DIAGNOSIS — Z01.810 PREOP CARDIOVASCULAR EXAM: Primary | ICD-10-CM

## 2024-05-22 DIAGNOSIS — R00.1 BRADYCARDIA: ICD-10-CM

## 2024-05-22 DIAGNOSIS — E66.01 CLASS 3 SEVERE OBESITY DUE TO EXCESS CALORIES WITH SERIOUS COMORBIDITY AND BODY MASS INDEX (BMI) OF 45.0 TO 49.9 IN ADULT (HCC): ICD-10-CM

## 2024-05-22 PROCEDURE — 99214 OFFICE O/P EST MOD 30 MIN: CPT | Performed by: INTERNAL MEDICINE

## 2024-05-22 NOTE — PROGRESS NOTES
"CARDIOLOGY CONSULTATION  [facility]  [address] [place] [address]  Tel: [phone]      NAME: Linda Oneill  AGE: 33 y.o.  SEX: female  : 1990  MRN: [id number]      Chief Complaint:  Chief Complaint   Patient presents with    Follow-up         History of Present Illness:   Patient comes for preoperative cardiac risk assessment prior to gastric bypass surgery.  Pt denies chest pain / pressure, SOB, palpitations, lightheadedness, syncope, swelling feet, orthopnea, PND, claudication.    Patient denies any history of DM, CKD, CAD, CHF, TIA, stroke CVA.    Patient states she can climb 1 flight of steps without any problems      Past Medical History:  Past Medical History:   Diagnosis Date    Bronchitis     Closed disp fx of styloid process of left radius with routine healing     Last Assessed 2017    Depression     HIV (human immunodeficiency virus infection) ([facility])     Obesity, Class III, BMI 40-49.9 (morbid obesity) ([facility])     Psychiatric disorder     \"mood swings\"    Retained intrauterine contraceptive device (IUD) 2/15/2022    Seizure ([facility])     pt denies- blacksout         Past Surgical History:  Past Surgical History:   Procedure Laterality Date     SECTION      [place] HYSTEROSCOPY BX ENDOMETRIUM&/POLYPC W/WO D&C N/A 02/15/2022    Procedure: DILATATION AND CURETTAGE (D&C) WITH HYSTEROSCOPY REMOVAL OF RETAINED IUD ARM;  Surgeon: Janet Millard MD;  [facility];  Service: Gynecology    [place] INSERTION INTRAUTERINE DEVICE IUD N/A 02/15/2022    Procedure: INSERTION OF IUD MIRENA;  Surgeon: Janet Millard MD;  [facility];  Service: Gynecology    WISDOM TOOTH EXTRACTION      all 4         Family History:  Family History   Problem Relation Age of Onset    Hypertension Mother     Deep vein thrombosis Father     Obesity Daughter     Breast cancer Neg Hx     Ovarian cancer Neg Hx     " Uterine cancer Neg Hx     Cervical cancer Neg Hx          Social History:  Social History     Socioeconomic History    Marital status: Single     Spouse name: None    Number of children: None    Years of education: None    Highest education level: None   Occupational History    None   Tobacco Use    Smoking status: Former     Current packs/day: 0.00     Types: Cigarettes     Quit date: 2010     Years since quittin.3    Smokeless tobacco: Current   Vaping Use    Vaping status: Some Days    Substances: Nicotine, Flavoring   Substance and Sexual Activity    Alcohol use: Not Currently     Comment: rarely    Drug use: No    Sexual activity: Yes     Birth control/protection: I.U.D., None     Comment: Paraguard   Other Topics Concern    None   Social History Narrative    None     Social Determinants of Health     Financial Resource Strain: Not on file   Food Insecurity: Not on file   Transportation Needs: Not on file   Physical Activity: Not on file   Stress: Not on file   Social Connections: Not on file   Intimate Partner Violence: Not on file   Housing Stability: Not on file         Active Problems:  Patient Active Problem List   Diagnosis    Depression, major, recurrent, moderate (HCC)    Family history of ischemic heart disease (IHD)    Class 3 severe obesity due to excess calories without serious comorbidity with body mass index (BMI) of 45.0 to 49.9 in adult (HCC)    Primary insomnia    Depression, recurrent (HCC)         The following portions of the patient's history were reviewed and updated as appropriate: past medical history, past surgical history, past family history,  past social history, current medications, allergies and problem list.      Review of Systems:  Constitutional: Denies fever, chills  Eyes: Denies eye redness, eye discharge  ENT: Denies hearing loss, sneezing, nasal discharge, sore throat   Respiratory: Denies cough, expectoration, shortness of breath  Cardiovascular: Denies chest pain,  palpitations, lower extremity swelling  Gastrointestinal: Denies abdominal pain, nausea, vomiting, diarrhea  Genito-Urinary: Denies dysuria, incontinence  Musculoskeletal: Denies back pain, joint pain, muscle pain  Neurologic: Denies lightheadedness, syncope, headache, seizures  Endocrine: Denies polydipsia, temperature intolerance  Allergy and Immunology: Denies hives, insect bite sensitivity  Hematological and Lymphatic: Denies bleeding problems, swollen glands   Psychological: Denies depression, suicidal ideation, anxiety, panic  Dermatological: Denies pruritus, rash, skin lesion changes      Vitals:  Vitals:    05/22/24 0916   BP: 120/80   Pulse: 72   Resp: 16   SpO2: 98%       Body mass index is 49.16 kg/m².    Weight (last 2 days)       Date/Time Weight    05/22/24 0916 136 (300)              Physical Examination:  General: Patient is not in acute distress. Awake, alert, oriented in time, place and person. Responding to commands  Head: Normocephalic. Atraumatic  Eyes: Both pupils normal sized, round and reactive to light. Nonicteric  ENT: Normal external ear canals  Neck: Supple. JVP not raised. Trachea central. No thyromegaly  Lungs: Bilateral bronchovascular breath sounds with no crackles or rhonchi  Chest wall: No tenderness  Cardiovascular: RRR. S1 and S2 normal. No murmur, rub or gallop  Gastrointestinal: Abdomen soft, nontender. No guarding or rigidity. Liver and spleen not palpable. Bowel sounds present  Neurologic: Patient is awake, alert, oriented in time, place and person. Responding to commands. Moving all extremities  Integumentary:  No skin rash  Lymphatic: No cervical lymphadenopathy  Back: Symmetric. No CVA tenderness  Extremities: No clubbing, cyanosis or edema      Laboratory Results:  CBC with diff:   Lab Results   Component Value Date    WBC 8.52 03/20/2024    RBC 4.40 03/20/2024    HGB 12.6 03/20/2024    HCT 39.4 03/20/2024    MCV 90 03/20/2024    MCH 28.6 03/20/2024    RDW 12.9 03/20/2024  "    03/20/2024       CMP:  Lab Results   Component Value Date    CREATININE 0.78 03/20/2024    BUN 10 03/20/2024    K 4.5 03/20/2024     03/20/2024    CO2 26 03/20/2024    ALKPHOS 71 03/20/2024    ALT 11 03/20/2024    AST 23 03/20/2024       Lab Results   Component Value Date    HGBA1C 5.9 (H) 05/01/2024       Lipid Profile:   No results found for: \"CHOL\"  Lab Results   Component Value Date    HDL 35 (L) 03/20/2024    HDL 44 (L) 07/07/2022    HDL 50 08/25/2021     Lab Results   Component Value Date    LDLCALC 81 03/20/2024    LDLCALC 103 (H) 07/07/2022    LDLCALC 104 (H) 08/25/2021     Lab Results   Component Value Date    TRIG 173 (H) 03/20/2024    TRIG 79 07/07/2022    TRIG 110 08/25/2021       EKG: Reviewed by me.  5/22/2024.  Sinus bradycardia (53 bpm) with marked sinus arrhythmia.  Septal infarct, age undetermined.      Medications:    Current Outpatient Medications:     multivitamin (THERAGRAN) TABS, Take 1 tablet by mouth daily, Disp: , Rfl:     sertraline (ZOLOFT) 100 mg tablet, TAKE 1.5 TABLETS (150 MG TOTAL) BY MOUTH DAILY, Disp: 135 tablet, Rfl: 0      Allergies:  No Known Allergies      Assessment and Plan:  1. Preop cardiovascular exam  EKG report discussed with the patient in detail.  Holter monitor ordered    2. Bradycardia  Holter monitor ordered  - Holter monitor; Future    3. Class 3 severe obesity due to excess calories with serious comorbidity and body mass index (BMI) of 45.0 to 49.9 in adult (HCC)  Patient going in for bariatric surgery  - Ambulatory Referral to Cardiology      Recommend aggressive risk factor modification and therapeutic lifestyle changes.  Low-salt, low-calorie, low-fat, low-cholesterol diet with regular exercise and to optimize weight.  I will defer the ordering and monitoring of necessity lab studies to you, but I am available and happy to review and manage any of the data at your request in the future.    Discussed concepts of atherosclerosis, including signs " and symptoms of cardiac disease.    Previous studies were reviewed.    Safety measures were reviewed.  Questions were entertained and answered.  Patient was advised to report any problems requiring medical attention.    Follow-up with PCP and appropriate specialist and lab work as discussed.    Return for follow up visit as scheduled or earlier, if needed.  Thank you for allowing me to participate in the care and evaluation of your patient.  Should you have any questions, please feel free to contact me.    Eneida Hensley MD  5/22/2024,10:06 AM

## 2024-05-28 ENCOUNTER — HOSPITAL ENCOUNTER (OUTPATIENT)
Dept: NON INVASIVE DIAGNOSTICS | Facility: HOSPITAL | Age: 34
Discharge: HOME | End: 2024-05-28
Attending: INTERNAL MEDICINE
Payer: COMMERCIAL

## 2024-05-28 DIAGNOSIS — R00.1 BRADYCARDIA: ICD-10-CM

## 2024-05-28 PROCEDURE — 93225 XTRNL ECG REC<48 HRS REC: CPT

## 2024-05-28 PROCEDURE — 93226 XTRNL ECG REC<48 HR SCAN A/R: CPT

## 2024-05-29 DIAGNOSIS — F33.1 DEPRESSION, MAJOR, RECURRENT, MODERATE (HCC): ICD-10-CM

## 2024-05-29 RX ORDER — SERTRALINE HYDROCHLORIDE 100 MG/1
150 TABLET, FILM COATED ORAL DAILY
Qty: 135 TABLET | Refills: 0 | Status: SHIPPED | OUTPATIENT
Start: 2024-05-29

## 2024-06-03 PROCEDURE — 93227 XTRNL ECG REC<48 HR R&I: CPT | Performed by: INTERNAL MEDICINE

## 2024-06-04 ENCOUNTER — TELEPHONE (OUTPATIENT)
Dept: CARDIOLOGY CLINIC | Facility: CLINIC | Age: 34
End: 2024-06-04

## 2024-06-04 NOTE — TELEPHONE ENCOUNTER
----- Message from Eneida Hensley MD sent at 6/3/2024  5:15 PM EDT -----  Please call and inform the patient that the Holter test was overall normal.

## 2024-06-05 DIAGNOSIS — Z01.812 BLOOD TESTS PRIOR TO TREATMENT OR PROCEDURE: ICD-10-CM

## 2024-06-05 DIAGNOSIS — F17.291 OTHER TOBACCO PRODUCT NICOTINE DEPENDENCE IN REMISSION: ICD-10-CM

## 2024-06-05 DIAGNOSIS — Z72.0 VAPES NICOTINE CONTAINING SUBSTANCE: ICD-10-CM

## 2024-06-05 DIAGNOSIS — E66.01 OBESITY, CLASS III, BMI 40-49.9 (MORBID OBESITY) (HCC): ICD-10-CM

## 2024-06-05 DIAGNOSIS — F17.200 NICOTINE DEPENDENCE: ICD-10-CM

## 2024-06-05 DIAGNOSIS — R63.8 ABNORMAL CRAVING: ICD-10-CM

## 2024-06-05 DIAGNOSIS — Z01.818 PRE-OPERATIVE CLEARANCE: Primary | ICD-10-CM

## 2024-06-26 ENCOUNTER — CLINICAL SUPPORT (OUTPATIENT)
Dept: BARIATRICS | Facility: CLINIC | Age: 34
End: 2024-06-26

## 2024-06-26 VITALS — WEIGHT: 293 LBS | BODY MASS INDEX: 49.33 KG/M2

## 2024-06-26 DIAGNOSIS — E66.01 CLASS 3 SEVERE OBESITY DUE TO EXCESS CALORIES WITHOUT SERIOUS COMORBIDITY WITH BODY MASS INDEX (BMI) OF 45.0 TO 49.9 IN ADULT (HCC): Primary | ICD-10-CM

## 2024-06-26 PROCEDURE — RECHECK

## 2024-06-26 NOTE — PROGRESS NOTES
Behavioral Health Follow Up Note:      Weight Check:  Surgeon: Dr. Yanna Gaines    Starting weight 298.8 #  Today's weight 301 #      Surgery month:  August/ September  Surgery deadline: December    Mental health and wellness - Patient is appropriately making changes based off the information contained in the bariatric manual.  Patient is modifying behaviors and demonstrating adapting to change.  Tracking her food intake.       Eating behaviors -  drinking more water.  Can improve.  Reducing portions.  Practicing slowing down and chewing her food more.  Does not eat any sea food.  Does have protein shake. Using [place] Instant Breakfast - low protein     Activity -  walking, can increase    Progress toward program requirements    Workflow:  Psych and/or D+A additional documentation  Pending psych assessment from Dafne Boyle  PCP Letter: 2/26/2024  Support Group: RECOMMENDED  Surgeon Appt.: Pending  EGD : pending  Cardiac Risk Assessment: pending  Sleep Studies: N/A  Bloodwork: pending       Goals:  complete nicotine test.

## 2024-06-28 ENCOUNTER — APPOINTMENT (OUTPATIENT)
Dept: LAB | Facility: HOSPITAL | Age: 34
End: 2024-06-28
Payer: COMMERCIAL

## 2024-06-28 DIAGNOSIS — Z01.818 PRE-OPERATIVE CLEARANCE: ICD-10-CM

## 2024-06-28 DIAGNOSIS — Z01.812 BLOOD TESTS PRIOR TO TREATMENT OR PROCEDURE: ICD-10-CM

## 2024-06-28 DIAGNOSIS — F17.200 NICOTINE DEPENDENCE: ICD-10-CM

## 2024-06-28 DIAGNOSIS — F17.291 OTHER TOBACCO PRODUCT NICOTINE DEPENDENCE IN REMISSION: ICD-10-CM

## 2024-06-28 DIAGNOSIS — E66.01 OBESITY, CLASS III, BMI 40-49.9 (MORBID OBESITY) (HCC): ICD-10-CM

## 2024-06-28 DIAGNOSIS — R63.8 ABNORMAL CRAVING: ICD-10-CM

## 2024-06-28 DIAGNOSIS — Z72.0 VAPES NICOTINE CONTAINING SUBSTANCE: ICD-10-CM

## 2024-06-28 PROCEDURE — G0480 DRUG TEST DEF 1-7 CLASSES: HCPCS

## 2024-06-28 PROCEDURE — 80323 ALKALOIDS NOS: CPT

## 2024-07-02 ENCOUNTER — TELEPHONE (OUTPATIENT)
Dept: BARIATRICS | Facility: CLINIC | Age: 34
End: 2024-07-02

## 2024-07-02 ENCOUNTER — TELEPHONE (OUTPATIENT)
Dept: PSYCHIATRY | Facility: CLINIC | Age: 34
End: 2024-07-02

## 2024-07-02 NOTE — TELEPHONE ENCOUNTER
Patient contacted the office to schedule a follow up visit with provider. Patient is now scheduled for 7/15/24  at 2pm virtually.

## 2024-07-02 NOTE — TELEPHONE ENCOUNTER
Returned pt. Call:  LVM:  indicated that the nicotine test is still pending results and will be monitored by staff:  MIN

## 2024-07-15 ENCOUNTER — TELEPHONE (OUTPATIENT)
Dept: CARDIOLOGY CLINIC | Facility: CLINIC | Age: 34
End: 2024-07-15

## 2024-07-15 ENCOUNTER — TELEMEDICINE (OUTPATIENT)
Dept: BEHAVIORAL/MENTAL HEALTH CLINIC | Facility: CLINIC | Age: 34
End: 2024-07-15
Payer: COMMERCIAL

## 2024-07-15 DIAGNOSIS — F33.1 DEPRESSION, MAJOR, RECURRENT, MODERATE (HCC): Primary | ICD-10-CM

## 2024-07-15 PROCEDURE — 90834 PSYTX W PT 45 MINUTES: CPT | Performed by: COUNSELOR

## 2024-07-15 NOTE — TELEPHONE ENCOUNTER
Lm for patient to call to schedule appt.      Linda BELTRE [facility] [place] [facility] days ago       Appointment Request From: Linda Oneill     With Provider: Eneida Hensley MD [[facility]]     Preferred Date Range: Any     Preferred Times: Any Time     Reason for visit: Cardiology Office Visit     Comments:  I did that was my ekg come out good.

## 2024-07-15 NOTE — PSYCH
"  Virtual Regular Visit    Verification of patient location:    Patient is located at Home in the following state in which I hold an active license [place]      Assessment/Plan:    Problem List Items Addressed This Visit     Depression, major, recurrent, moderate (HCC) - Primary       Goals addressed in session: Goal 1 and Goal 2          Reason for visit is No chief complaint on file.       Encounter provider Dafne Boyle      Recent Visits  No visits were found meeting these conditions.  Showing recent visits within past 7 days and meeting all other requirements  Today's Visits  Date Type Provider Dept   07/15/24 Telemedicine Dafne Boyle [facility] Therapist [place]   Showing today's visits and meeting all other requirements  Future Appointments  No visits were found meeting these conditions.  Showing future appointments within next 150 days and meeting all other requirements       The patient was identified by name and date of birth. Linda Oneill was informed that this is a telemedicine visit and that the visit is being conducted throughthe Epic Embedded platform. She agrees to proceed..  My office door was closed. No one else was in the room.  She acknowledged consent and understanding of privacy and security of the video platform. The patient has agreed to participate and understands they can discontinue the visit at any time.    Patient is aware this is a billable service.     Subjective  Linda Oneill is a 33 y.o. female  .      HPI     Past Medical History:   Diagnosis Date   • Bronchitis    • Closed disp fx of styloid process of left radius with routine healing     Last Assessed 12Mar2017   • Depression    • HIV (human immunodeficiency virus infection) ([facility])    • Obesity, Class III, BMI 40-49.9 (morbid obesity) ([facility])    • Psychiatric disorder     \"mood swings\"   • Retained intrauterine contraceptive device (IUD) 2/15/2022   • Seizure ([facility])     pt denies- blacksout       Past Surgical " History:   Procedure Laterality Date   •  SECTION     • [place] HYSTEROSCOPY BX ENDOMETRIUM&/POLYPC W/WO D&C N/A 02/15/2022    Procedure: DILATATION AND CURETTAGE (D&C) WITH HYSTEROSCOPY REMOVAL OF RETAINED IUD ARM;  Surgeon: Janet Millard MD;  [facility];  Service: Gynecology   • [place] INSERTION INTRAUTERINE DEVICE IUD N/A 02/15/2022    Procedure: INSERTION OF IUD MIRENA;  Surgeon: Janet Millard MD;  [facility];  Service: Gynecology   • WISDOM TOOTH EXTRACTION      all 4       Current Outpatient Medications   Medication Sig Dispense Refill   • multivitamin (THERAGRAN) TABS Take 1 tablet by mouth daily     • sertraline (ZOLOFT) 100 mg tablet Take 1.5 tablets (150 mg total) by mouth daily 135 tablet 0     No current facility-administered medications for this visit.        No Known Allergies    Review of Systems    Video Exam    There were no vitals filed for this visit.    Physical Exam     Behavioral Health Psychotherapy Progress Note    Psychotherapy Provided: Individual Psychotherapy     1. Depression, major, recurrent, moderate (HCC)            Goals addressed in session: Goal 1 and Goal 2     DATA: Clinician met with Linda via telehealth for individual therapy.  Linda processed miscommunication with paramour and working through her feelings with paramour in a healthy way. Clinician discussed utilizing journaling and taking breaks when she is feeling overly frustrated. She was open to utilizing journaling to get some emotional release and clarity on her feelings. She reports being both positive and negative emotions with current relationship and working through those emotions together to connect and bond. She also more reports working on conflict resolution skills with her daughters and her paramours niece.   During this session, this clinician used the following therapeutic modalities: Client-centered Therapy and Supportive Psychotherapy    Substance Abuse was not  "addressed during this session. If the client is diagnosed with a co-occurring substance use disorder, please indicate any changes in the frequency or amount of use: n/a. Stage of change for addressing substance use diagnoses: No substance use/Not applicable    ASSESSMENT:  Linda Oneill presents with a Euthymic/ normal mood.     her affect is Normal range and intensity, which is congruent, with her mood and the content of the session. The client has made progress on their goals.    Linda was engaged in the session. Linda Oneill presents with a none risk of suicide, none risk of self-harm, and none risk of harm to others.    For any risk assessment that surpasses a \"low\" rating, a safety plan must be developed.    A safety plan was indicated: no  If yes, describe in detail n/a    PLAN: Between sessions, Linda Oneill will utilize healthy communication skills to express emotions. At the next session, the therapist will use Client-centered Therapy and Supportive Psychotherapy to address depressive symptoms.    Behavioral Health Treatment Plan and Discharge Planning: Linda Oneill is aware of and agrees to continue to work on their treatment plan. They have identified and are working toward their discharge goals. yes    Visit start and stop times:    07/15/24  Start Time: 1400  Stop Time: 1440  Total Visit Time: 40 minutes        "

## 2024-07-16 LAB
COTININE SERPL-MCNC: 83.5 NG/ML
NICOTINE SERPL-MCNC: 4.5 NG/ML

## 2024-07-23 ENCOUNTER — TELEPHONE (OUTPATIENT)
Dept: BARIATRICS | Facility: CLINIC | Age: 34
End: 2024-07-23

## 2024-07-23 NOTE — TELEPHONE ENCOUNTER
Email sent to: [email or web address]     Esdras Mckeon.  I just tried calling you twice and was unable to get through or leave a message.  I sent you a [employer] message and a letter with this information as well in hopes that this reaches you.     The results of your nicotine blood test are in and are again positive.  Since it is past your deadline for your negative nicotine test, at this time your bariatric surgery program is being cancelled, and your follow-up appointment on 7/26/2024 at 1:00pm is also being cancelled.    At this time you do have the option to work with our Medical Weight Management (Non-Surgical) Program while you continue to work on your smoking cessation.  If you are interested in this you can call our office at [phone] to schedule a consultation.    If you decide in the future to restart the bariatric surgery program, you will need to have a negative nicotine blood test BEFORE restarting the program.    If you have any other questions please don't hesitate to reach out to our office.    Kay Choudhary, MS, RD, CSOWM, LDN  Bariatric Dietitian  Board Certified Specialist in Obesity and Weight Management  [facility]  [address], [place] [address]  [phone]  [email or web address]

## 2024-07-23 NOTE — TELEPHONE ENCOUNTER
Attempted to call pt x2 regarding positive nicotine blood test.  Pt did not answer phone and no option to leave a voicemail.

## 2024-07-24 NOTE — TELEPHONE ENCOUNTER
Pt called back to reschedule her cancelled appt.  I saw the notes about the nicotine and called the office.  Kathy in the office told me to transfer to her and she will take it from there

## 2024-08-14 DIAGNOSIS — F17.201 NICOTINE DEPENDENCE IN REMISSION, UNSPECIFIED NICOTINE PRODUCT TYPE: ICD-10-CM

## 2024-08-14 DIAGNOSIS — F17.200 SMOKER: Primary | ICD-10-CM

## 2024-08-14 DIAGNOSIS — Z01.818 PRE-OP TESTING: ICD-10-CM

## 2024-08-14 DIAGNOSIS — Z01.812 BLOOD TESTS PRIOR TO TREATMENT OR PROCEDURE: ICD-10-CM

## 2024-08-26 DIAGNOSIS — F33.1 DEPRESSION, MAJOR, RECURRENT, MODERATE (HCC): ICD-10-CM

## 2024-08-26 RX ORDER — SERTRALINE HYDROCHLORIDE 100 MG/1
TABLET, FILM COATED ORAL
Qty: 135 TABLET | Refills: 1 | Status: SHIPPED | OUTPATIENT
Start: 2024-08-26

## 2024-08-29 ENCOUNTER — APPOINTMENT (OUTPATIENT)
Dept: LAB | Facility: HOSPITAL | Age: 34
End: 2024-08-29
Payer: COMMERCIAL

## 2024-08-29 DIAGNOSIS — Z01.818 PRE-OP TESTING: ICD-10-CM

## 2024-08-29 DIAGNOSIS — F17.201 NICOTINE DEPENDENCE IN REMISSION, UNSPECIFIED NICOTINE PRODUCT TYPE: ICD-10-CM

## 2024-08-29 DIAGNOSIS — Z01.812 BLOOD TESTS PRIOR TO TREATMENT OR PROCEDURE: ICD-10-CM

## 2024-08-29 DIAGNOSIS — F17.200 SMOKER: ICD-10-CM

## 2024-08-29 PROCEDURE — 36415 COLL VENOUS BLD VENIPUNCTURE: CPT

## 2024-08-29 PROCEDURE — G0480 DRUG TEST DEF 1-7 CLASSES: HCPCS

## 2024-08-29 PROCEDURE — 80323 ALKALOIDS NOS: CPT

## 2024-09-06 LAB
COTININE SERPL-MCNC: <1 NG/ML
NICOTINE SERPL-MCNC: <1 NG/ML

## 2024-09-18 ENCOUNTER — CONSULT (OUTPATIENT)
Dept: BARIATRICS | Facility: CLINIC | Age: 34
End: 2024-09-18
Payer: COMMERCIAL

## 2024-09-18 VITALS
HEART RATE: 78 BPM | DIASTOLIC BLOOD PRESSURE: 70 MMHG | HEIGHT: 66 IN | TEMPERATURE: 97.5 F | BODY MASS INDEX: 46.69 KG/M2 | WEIGHT: 290.5 LBS | SYSTOLIC BLOOD PRESSURE: 116 MMHG

## 2024-09-18 DIAGNOSIS — E66.01 OBESITY, CLASS III, BMI 40-49.9 (MORBID OBESITY) (HCC): Primary | ICD-10-CM

## 2024-09-18 PROCEDURE — 99203 OFFICE O/P NEW LOW 30 MIN: CPT | Performed by: SURGERY

## 2024-09-18 NOTE — PROGRESS NOTES
"    BARIATRIC CONSULT-INITIAL - BARIATRIC SURGERY  Linda Oneill 33 y.o. female MRN: [id number]  Unit/Bed#:  Encounter: [id number]      HPI:  Linda Oneill is a 33 y.o. female who presents with morbid obesity to discuss weight loss options.    Review of Systems    Historical Information   Past Medical History:   Diagnosis Date    Bronchitis     Closed disp fx of styloid process of left radius with routine healing     Last Assessed 2017    Depression     HIV (human immunodeficiency virus infection) ([facility])     Obesity, Class III, BMI 40-49.9 (morbid obesity) ([facility])     Psychiatric disorder     \"mood swings\"    Retained intrauterine contraceptive device (IUD) 2/15/2022    Seizure ([facility])     pt denies- blacksout     Past Surgical History:   Procedure Laterality Date     SECTION      [place] HYSTEROSCOPY BX ENDOMETRIUM&/POLYPC W/WO D&C N/A 02/15/2022    Procedure: DILATATION AND CURETTAGE (D&C) WITH HYSTEROSCOPY REMOVAL OF RETAINED IUD ARM;  Surgeon: Janet Millard MD;  [facility];  Service: Gynecology    [place] INSERTION INTRAUTERINE DEVICE IUD N/A 02/15/2022    Procedure: INSERTION OF IUD MIRENA;  Surgeon: Janet Millard MD;  [facility];  Service: Gynecology    WISDOM TOOTH EXTRACTION      all 4     Social History   Social History     Substance and Sexual Activity   Alcohol Use Not Currently    Comment: rarely     Social History     Substance and Sexual Activity   Drug Use No     Social History     Tobacco Use   Smoking Status Former    Current packs/day: 0.00    Types: Cigarettes    Quit date: 2010    Years since quittin.7   Smokeless Tobacco Current     Family History: Family history non-contributory    Meds/Allergies   all medications and allergies reviewed  No Known Allergies    Objective       Current Vitals:   Blood Pressure: 116/70 (24 1017)  Pulse: 78 (24 1017)  Temperature: 97.5 °F (36.4 °C) (24 1017)  Temp Source: Tympanic (24 " "1017)  Height: 5' 5.5\" (166.4 cm) (09/18/24 1017)  Weight - Scale: 132 kg (290 lb 8 oz) (09/18/24 1017)  Body mass index is 47.61 kg/m².      Invasive Devices       None                   Physical Exam    Lab Results: I have personally reviewed pertinent lab results.    Imaging: No pertinent imaging studies reviewed.  EKG, Pathology, and Other Studies: No pertinent imaging studies reviewed.    Code Status: [unfilled]  Advance Directive and Living Will:      Power of :    POLST:      Assessment/PLAN:            Patient has a long history of morbid obesity and is presenting to discuss the surgical weight loss options. Patient failed previous attempts to achieve sustainable long term weight loss and also failed conservative management. We had a long discussion regarding all the surgical weight-loss options at our disposal at this point and reviewed the risks and benefits of each procedure in details as it relates to her age, BMI and medical conditions.    Patient elected to undergo RYGB    We also discussed the importance and need of a preoperative workup to improve postoperative outcomes and reduce the risk of perioperative complications.  I have explained our Enhanced Recovery After Bariatric Surgery (ERABS) protocol and benefits including preoperative, intraoperative and postoperative elements.      As  part of the preoperative process, I will schedule the patient to meet with our dietician, , and . After the evaluation, a referral will also be place to be evaluated by cardiology for perioperative stratification and optimization.     Patient will also be scheduled for an  EGD to evaluate the anatomy of her GI tract prior to the operation.       She was given the opportunity to ask questions and I have answered all of them.  I have discussed and educated the patient with regards to the components of our multidisciplinary program and the importance of compliance and follow " "up in the post operative period. The patient was also instructed with regards to the importance of behavior modification, nutritional counseling, support meeting attendance and lifestyle changes that are important to ensure long term success.    In terms of comorbidities patient suffers mostly of   Past Medical History:   Diagnosis Date    Bronchitis     Closed disp fx of styloid process of left radius with routine healing     Last Assessed 12Mar2017    Depression     HIV (human immunodeficiency virus infection) ([facility])     Obesity, Class III, BMI 40-49.9 (morbid obesity) ([facility])     Psychiatric disorder     \"mood swings\"    Retained intrauterine contraceptive device (IUD) 2/15/2022    Seizure ([facility])     pt denies- blacksout     Although there is a great statistical chance of improvement or even resolution of most of her associated comorbidities, the results vary from patient to patient and they largely depend on her commitment and compliance.   I informed the patient that the rate of resolution of comorbid conditions following weight loss surgery is between 60 and 90% depending on the severity of the specific medical condition.I discussed and educated the patient regarding the different components of our multidisciplinary program and the importance of compliance and follow-up in the postoperative period. All questions answered. Patient understands risks and benefits. An image of the procedure was also shown to the patient. After showing the image we discussed all the technical aspects of the procedure and also the potential complications including but not limited to gastrointestinal perforation, leak, obstruction, stricture and hemorrhage. I spent 30 min with the patient more than 50% of the time was spent face to face educating the patient and coordinating care.  "

## 2024-09-18 NOTE — PROGRESS NOTES
- Height/Weight:  65.5, 290.5 lb  - BMI:  47.6  - Medical conditions related to obesity: no  - Does the patient smoke/vape (nicotine, marijuana, hookah, etc): no  - StopBANG:  3/8   - Does the patient currently take a weight loss medication: no

## 2024-09-24 ENCOUNTER — CLINICAL SUPPORT (OUTPATIENT)
Dept: BARIATRICS | Facility: CLINIC | Age: 34
End: 2024-09-24

## 2024-09-24 DIAGNOSIS — E66.01 MORBID OBESITY (HCC): Primary | ICD-10-CM

## 2024-09-24 PROCEDURE — RECHECK

## 2024-09-24 NOTE — PROGRESS NOTES
Spoke to patient on the phone:    Patient is interested in the bariatric surgical process. Patient qualifies for surgery with current comorbidities and BMI. Discussed the entire surgical workup process and all requirements of [facility] program and patient's insurance. Answered all questions at the time of the phone call. All SW/RD questions redirected to the next appointment, the 2-hour evaluation.    Patient has been informed to contact insurance to verify there is Bariatric surgery  coverage written on the policy prior next appointment. Also to discuss of any out of pocket expense if insurance does not cover at 100%      Patient verbalized understanding of the surgical process at [facility] and had no further questions at this time.

## 2024-10-17 ENCOUNTER — TELEPHONE (OUTPATIENT)
Age: 34
End: 2024-10-17

## 2024-10-17 NOTE — TELEPHONE ENCOUNTER
Patient called to update her insurance.  Her new plan is [employer] P[employer] Multiplan. Her ID is [id number]. I was not able to locate if [facility] is a par provider. Recommended that she call her insurance and ask if [facility] participates with her insurance. Told her to bring the new insurance card to her appointment so that we could upload it into her chart.

## 2024-10-18 NOTE — PROGRESS NOTES
"Bariatric Nutrition Assessment Note    Type of surgery    Preop no months required  Surgery Date: Tentative JAN/FEB 2025  Surgeon: Dr. Yanna Gaines    Nutrition Assessment   Linda Oneill  33 y.o.  female   Wt with BMI of 25: 152.57lbs  Pre-Op Excess Wt: 146.23lbs  Ht 5' 5.5\" (1.664 m)   Wt 133 kg (293 lb 6.4 oz)   BMI 48.08 kg/m²      Wt Readings from Last 3 Encounters:   09/18/24 132 kg (290 lb 8 oz)   06/26/24 (!) 137 kg (301 lb)   05/22/24 136 kg (300 lb)        [facility] [place] Equation:    BMR= 2072kcal  Weight Maintenance: 2486kcal  Estimated calories for weight loss 1486-1986kcal ( 1-2# per wk wt loss - sedentary )  Estimated protein needs 69.35-104g/day (1.0-1.5 gms/kg IBW )   Estimated fluid needs 2081-2427ml (30-35 ml/kg IBW )      Weight History   Onset of Obesity: Adult  Family history of obesity: Yes: father, mother, brother, sister all had WLS at [facility]- all doing well  Wt Loss Attempts: Commercial Programs (Weight Watchers, Offbeat Guides, etc.)  Counseling with  MD  Meal Replacements (Medifast, Slim Fast, etc.)  Nutrition Counseling with RD  Self Created Diets (Portion Control, Healthy Food Choices, etc.)  Patient has tried the above for 6 months or more with insufficient weight loss or weight regain, which is why patient has requested to be evaluated for weight loss surgery today  Maximum Wt Lost: 60lbs on nutrisystem in 2009  Pt had previously worked with [facility] surgical weight loss program from March 2021 to June 2022, then worked with [facility] M[facility] program from July 2022 to February 2023    Review of History and Medications   Past Medical History:   Diagnosis Date    Bronchitis     Closed disp fx of styloid process of left radius with routine healing     Last Assessed 12Mar2017    Depression     HIV (human immunodeficiency virus infection) ([facility])     Obesity, Class III, BMI 40-49.9 (morbid obesity) ([facility])     Psychiatric disorder     \"mood swings\"    Retained intrauterine contraceptive device (IUD) " 2/15/2022    Seizure ([facility])     pt denies- blacksout     Past Surgical History:   Procedure Laterality Date     SECTION      IA HYSTEROSCOPY BX ENDOMETRIUM&/POLYPC W/WO D&C N/A 02/15/2022    Procedure: DILATATION AND CURETTAGE (D&C) WITH HYSTEROSCOPY REMOVAL OF RETAINED IUD ARM;  Surgeon: Janet Millard MD;  Location: [facility];  Service: Gynecology    IA INSERTION INTRAUTERINE DEVICE IUD N/A 02/15/2022    Procedure: INSERTION OF IUD MIRENA;  Surgeon: Janet Millard MD;  Location: [facility];  Service: Gynecology    WISDOM TOOTH EXTRACTION      all 4     Social History     Socioeconomic History    Marital status: Single     Spouse name: Not on file    Number of children: Not on file    Years of education: Not on file    Highest education level: Not on file   Occupational History    Not on file   Tobacco Use    Smoking status: Former     Current packs/day: 0.00     Types: Cigarettes     Quit date: 2010     Years since quittin.8    Smokeless tobacco: Current   Vaping Use    Vaping status: Some Days    Substances: Nicotine, Flavoring   Substance and Sexual Activity    Alcohol use: Not Currently     Comment: rarely    Drug use: No    Sexual activity: Yes     Birth control/protection: I.U.D., None     Comment: Paraguard   Other Topics Concern    Not on file   Social History Narrative    Not on file     Social Determinants of Health     Financial Resource Strain: Not on file   Food Insecurity: Not on file   Transportation Needs: Not on file   Physical Activity: Not on file   Stress: Not on file   Social Connections: Not on file   Intimate Partner Violence: Not on file   Housing Stability: Not on file       Current Outpatient Medications:     multivitamin (THERAGRAN) TABS, Take 1 tablet by mouth daily, Disp: , Rfl:     sertraline (ZOLOFT) 100 mg tablet, TAKE 1.5 TABLETS (150MG TOTAL) BY MOUTH DAILY, Disp: 135 tablet, Rfl: 1    Food Intake and Lifestyle Assessment   Food Intake Assessment  completed via usual diet recall  Breakfast: 9:30 am / 10:00 am coffee-32oz with sweetened creamer: hazelnut or caramel macchiato or pumpkin flavored  1-2 tbsp of PB   Snack: 0  Lunch: 12/ 1 pm - soup and crackers or pre-made bag of salad with chicken or PB & J on bread   Snack: none  Dinner: pt cooks: 5-6pm: pasta with chopped meat, tacos on Tuesday with vegetables corn on the cob ,mashed potatoes   Snack: chips gold fish crackers or pretzels   Beverage intake: water, crystal light and coffee  Protein supplement: none  Estimated protein intake per day: 30-60g  Estimated fluid intake per day: >64oz  Meals eaten away from home: 1 per week  Typical meal pattern: 2-3 meals per day and 0-2 snacks per day  Eating Behaviors: Consumption of high calorie/ high fat foods, Consumption of high calorie beverages, Frequent snacking/ grazing, and Craves sweet foods and savory food   Food allergies or intolerances: No Known Allergies FA  Cultural or Confucianism considerations: dislikes seafood    Physical Assessment  Physical Activity  Types of exercise: None-house chores  Up and down stairs doing laundry    ( 5 times per week )  One hour before picking up the kids from school   Sometimes on weekends   Current physical limitations: none    Psychosocial Assessment   Support systems: lives with her two 10yo twin girls  Parents are her support   Lives on same property as her family  Socioeconomic factors: not currently working, twins in school  Does not receive food stamps, WIC, etc  Sleeps   Bedtime : 12 midnight  Wakes up 7 am     Nutrition Diagnosis  Diagnosis: Overweight / Obesity (NC-3.3), Excessive energy intake (NI-1.5), Inappropriate intake of carbohydrates (NI-5.8.3), and Undesirable food choices (NB-1.7)  Related to: Food and nutrition-related knowledge deficit, Physical inactivity, Excessive energy intake, and Inability or lack of desire to manage self-care  As Evidenced by: BMI >25, Excessive energy intake, and  "Unintentional weight gain     Nutrition Prescription: Recommend the following diet  1500 calories/ 65 grams protein     Interventions and Teaching   Discussed pre-op and post-op nutrition guidelines.       Patient educated and handouts provided.  Surgical changes to stomach / GI  Capacity of post-surgery stomach  Diet progression  Adequate hydration  Sugar and fat restriction to decrease \"dumping syndrome\"  Weight loss plateaus/ possibility of weight regain  Exercise  Suggestions for pre-op diet  Nutrition considerations after surgery  Protein supplements  Meal planning and preparation  Dietary and lifestyle changes  Possible problems with poor eating habits  Techniques for self monitoring and keeping daily food journal  Potential for food intolerance after surgery, and ways to deal with them including: lactose intolerance, nausea, reflux, vomiting, diarrhea, food intolerance, appetite changes, gas  Vitamin / Mineral supplementation of Multivitamin with minerals and Vitamin   Was taking vitamins and minerals, needs to  more.     Patient is not currently pregnant and doesn't desire to become pregnant a minimum of one year post-op    Barriers to learning: Desire/Motivation    Readiness to change: preparation    Prior research on procedure: discussed with provider and friends or family    Comprehension: needs reinforcement     Expected Compliance: fair    Recommendations  Pt is an appropriate candidate for surgery. Yes  Negative nicotine 8/29/2024  Will need repeat nicotine level 3 1/2 weeks prior to surgery date.       Evaluation / Monitoring  Dietitian to Monitor: Eating pattern as discussed Tolerance of nutrition prescription Body weight Lab values Physical activity Bowel pattern    Goals  Eliminate sugar sweetened beverages, Food journal, Exercise 30 minutes 5 times per week, Complete lession plans 1-6, Eat 3 meals per day, and Eliminate mindless snacking  Food log 1500 calories/ 65 grams protein     Time " Spent:   1 Hour

## 2024-10-21 ENCOUNTER — PREP FOR PROCEDURE (OUTPATIENT)
Dept: BARIATRICS | Facility: CLINIC | Age: 34
End: 2024-10-21

## 2024-10-21 ENCOUNTER — CLINICAL SUPPORT (OUTPATIENT)
Dept: BARIATRICS | Facility: CLINIC | Age: 34
End: 2024-10-21

## 2024-10-21 VITALS — BODY MASS INDEX: 47.09 KG/M2 | HEIGHT: 66 IN | WEIGHT: 293 LBS

## 2024-10-21 DIAGNOSIS — E66.813 CLASS 3 SEVERE OBESITY DUE TO EXCESS CALORIES WITHOUT SERIOUS COMORBIDITY WITH BODY MASS INDEX (BMI) OF 45.0 TO 49.9 IN ADULT (HCC): Primary | ICD-10-CM

## 2024-10-21 DIAGNOSIS — E66.01 OBESITY, CLASS III, BMI 40-49.9 (MORBID OBESITY) (HCC): Primary | ICD-10-CM

## 2024-10-21 DIAGNOSIS — Z01.818 PRE-OPERATIVE CLEARANCE: ICD-10-CM

## 2024-10-21 DIAGNOSIS — Z01.818 PRE-OP TESTING: ICD-10-CM

## 2024-10-21 DIAGNOSIS — E66.01 CLASS 3 SEVERE OBESITY DUE TO EXCESS CALORIES WITHOUT SERIOUS COMORBIDITY WITH BODY MASS INDEX (BMI) OF 45.0 TO 49.9 IN ADULT (HCC): Primary | ICD-10-CM

## 2024-10-21 DIAGNOSIS — Z98.84 BARIATRIC SURGERY STATUS: Primary | ICD-10-CM

## 2024-10-21 DIAGNOSIS — R73.01 ELEVATED FASTING BLOOD SUGAR: ICD-10-CM

## 2024-10-21 PROCEDURE — RECHECK: Performed by: DIETITIAN, REGISTERED

## 2024-10-21 PROCEDURE — RECHECK

## 2024-10-21 NOTE — PROGRESS NOTES
Bariatric Behavioral Health Evaluation    Dr. Jose L Gaines  Today's weight 293.4#  No weight check requirements for insurance       Presenting Problem:  Linda Oneill  is a 33 y.o.   female   :  1990   Presents for a bariatric evaluation for surgery. Patient has history weight loss attempts and has struggled with success.      Is the patient seeking Bariatric Surgery Eval?  yes   The pt shared that she is seeking bariatric surgery because she  cares for her children and has no energy during the day as she eats unhealthy. Reports she has tried diets in the past and has not been successful in weight loss.  Family has had surgery father, mother, sister, brother and sister in law. They all have been successful.       Has Patient done any Research?  Yes, was in the bariatric program previously but did not end with surgery.     Realizes Post- Op Requirements? Yes, but will learn more through the program.     Pre-morbid level of function and history of present illness: (any kind of medical conditions) none noted at this time.      Support system: mother, father and siblings. S/O    Additional comments/stressors related to family/relationships/peer support: Patient identifies her parents as her support. Patient identifies the ongoing concerns for her children as a stressor/worrying.    Living situation: resides with her twin daughters (9)  Food is home cooked.   Three meals a day.  Some planned snacks in between.  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  Movement with house chores (stairs)         Family hx of obesity: father, mother and siblings     Traditions-,  rules/routines around food continued to adulthood: fried food,  fast food unhealthy snacks and candy.       Habits around food:  more mindful with  portion sizes, reduction in emotional eating.   More mindful and accountable since she was in the program earlier this year.      Mental Health, Trauma and Substance use Assessment     Psychiatric/Psychological Treatment Diagnosis: Pt is in agreement with Dx in EHR:  Depression w/ Rx  followed by PCP  Feels the medication is managing her depression overall.   Coping skill are music and awareness of breath.    Outpatient Counselor: Yes since 2022 Therapist- Dafne Boyle-[facility] in [place]     Monthly talk therapy -- Virtual      Psychiatrist: none noted    Have you had any Mental Health in-pt admissions? None noted     History of Eating Disorders: none noted     Family History-Drug or alcohol hx:none noted    Have you had any Drug and/or Alcohol use and treatment history: none noted    Have you had any Substance Use Treatment? None noted     Tobacco/Vaping History: History of nicotine use.  Recent negative test in EHR chart. Will need another negative test within 30 days prior to surgery         Domestic Violence: None reported     Abuse or Trauma History: none reported       Risk Assessment     Supports: reported to be intact  Risk of harm to self or others: (SI/HI) none noted during evaluation  No HI/SI         Presence of Audio/Visual Hallucinations: no     Access to weapons: not reported during interview.     Observation: This interview only (SW and RD will continue working with the patient throughout the program).      Based on the previous information, the client presents the following risk of harm to self or others: low        Physical/Mental Health Status:      Appearance: appropriate  Sociability: average  Affect: appropriate  Mood: calm  Thought Process: coherent  Speech: normal  Content: no impairment  Orientation: person  Yes , place  Yes , and time  Yes   Insight: emotional  good     BARIATRIC SURGERY EDUCATION CHECKLIST     Patient has received the following education related to  the bariatric surgery process and understands:     Patients may be required to complete a psychiatric evaluation and receive clearance for surgery from mental health provider.     Patients who undergo weight loss surgery are at higher risk of increased mental health concerns and suicide attempts.     Patients may be required to complete a full substance abuse evaluation and then complete all treatment recommendations prior to surgery.     If diagnosis of abuse/dependence results, patient may be required to remain sober for one (1) year before having bariatric surgery.     Patients on psychiatric medications should check with their provider to discuss psychiatric medications and the changes in absorption.  Patient should discuss all time release medications with provider and take all medications as prescribed.     The recommendation is that there is no use of any tobacco products, Hookah or vape's for the bariatric post-operation patient.     Bariatric surgery patients should not consume alcohol as a post-operative patient as it may increase risk of numerous health conditions including but not limited to alcohol abuse and ulcers.     There is a possibility of weight regain if patient does not follow all program guidelines and recommendations.     Bariatric surgery patients should exercise thirty (30) to sixty (60) minutes per day to maintain post-surgical weight loss.     Research indicates that bariatric patients are more successful when they see a therapist for up to two (2) years post-op.     Patients will follow all medical and dietary recommendations provided.     Patient will keep all scheduled appointments and follow up with their physician for a minimum of five (5) years.     Patient will take all vitamins as recommended.  Post-operative vitamins are life-long.     There is a goal month set.  All requirements should be met by this time. Don't wait to get started!     There is a deadline month set.  All  requirements must be finished by this time and if not, the patient will be halted in the surgery process. The patient can be referred to the medical weight management program or can come back to the surgical program once the unfinished tasks from the previous program are completed.       Female patients of childbearing years are informed that pregnancy is not recommended until 12 months post-op.        Recommendations: Recommended for surgery  no          Note :      Patient presented for behavioral health evaluation for the bariatric program.  Positive for  Mental Health Dx of Major Depressive w/Rx.   Currently in therapy since 2022 Therapist- [facility] in [place]. No hx of  Psychiatry. No Drug and/or Alcohol abuse or treatment.   Tobacco/Vaping History: Recent nicotine use.   Patient agrees to remain nicotine free post-surgery.  Patient educated regarding the impact of nicotine and alcohol on the post-surgery bariatric patient. Patient has no family history of tobacco and alcohol addiction.   Patient will begin making changes with relationship with food through behavior modifications and mindful techniques.  Tracking food intake for one week every three months can assist with weight maintenance and self-accountability for post-surgery success.   and Dietitian follow up visits are available for pre and post-surgery support.  Bariatric support group is available monthly.   Patient verbalized the ability to start making changes and create a healthy relationship around nutrition.    Patient meets criteria for surgery at this time and is referred to the bariatric surgeon.  There are no significant psychological problems identified at this time that would limit the ability of the patient to understand the procedure and comply with any medical and/or surgical recommendations.  Patient does not report having any  psychological co-morbidities that may contribute  to the patient's history and inability to lose weight nor is the patient diagnosed with an eating disorder. Patient displays willingness to comply with the preoperative and postoperative treatment plans.      Harinder Del Rio MSW, LCSW

## 2024-11-13 ENCOUNTER — TELEPHONE (OUTPATIENT)
Age: 34
End: 2024-11-13

## 2024-11-13 NOTE — TELEPHONE ENCOUNTER
Pt's father called in to speak with the ins coordinator. He states is expecting a call with the list of insurance the pt can switch over, in order to get the surgery. Parent was told will receive a call back from the practice.

## 2024-11-14 NOTE — TELEPHONE ENCOUNTER
Patients father Pranav was called @ [phone] left a message to contact me directly phone number was provided

## 2024-11-23 DIAGNOSIS — F51.01 PRIMARY INSOMNIA: Primary | ICD-10-CM

## 2024-11-25 ENCOUNTER — CLINICAL SUPPORT (OUTPATIENT)
Dept: BARIATRICS | Facility: CLINIC | Age: 34
End: 2024-11-25

## 2024-11-25 VITALS — BODY MASS INDEX: 48.34 KG/M2 | WEIGHT: 293 LBS

## 2024-11-25 DIAGNOSIS — E66.01 OBESITY, CLASS III, BMI 40-49.9 (MORBID OBESITY) (HCC): Primary | ICD-10-CM

## 2024-11-25 PROCEDURE — RECHECK: Performed by: DIETITIAN, REGISTERED

## 2024-11-25 NOTE — PROGRESS NOTES
"Bariatric Nutrition Assessment Note    Type of surgery    Preop no months required  Surgery Date: Tentative JAN/FEB 2025  Surgeon: Dr. Yanna Gaines    Nutrition Assessment   Linda Oneill  34 y.o.  female   Wt with BMI of 25: 152.57lbs  Pre-Op Excess Wt: 146.23lbs  Wt 134 kg (295 lb)   BMI 48.34 kg/m²      Wt Readings from Last 3 Encounters:   11/25/24 134 kg (295 lb)   10/21/24 133 kg (293 lb 6.4 oz)   09/18/24 132 kg (290 lb 8 oz)        Jj Taylor Equation:    BMR= 2072kcal  Weight Maintenance: 2486kcal  Estimated calories for weight loss 1486-1986kcal ( 1-2# per wk wt loss - sedentary )  Estimated protein needs 69.35-104g/day (1.0-1.5 gms/kg IBW )   Estimated fluid needs 2081-2427ml (30-35 ml/kg IBW )      Weight History   Onset of Obesity: Adult  Family history of obesity: Yes: father, mother, brother, sister all had WLS at [facility]- all doing well  Wt Loss Attempts: Commercial Programs (Weight Watchers, [employer], etc.)  Counseling with  MD  Meal Replacements (Medifast, Slim Fast, etc.)  Nutrition Counseling with RD  Self Created Diets (Portion Control, Healthy Food Choices, etc.)  Patient has tried the above for 6 months or more with insufficient weight loss or weight regain, which is why patient has requested to be evaluated for weight loss surgery today  Maximum Wt Lost: 60lbs on nutrisystem in 2009  Pt had previously worked with [facility] surgical weight loss program from March 2021 to June 2022, then worked with [facility] MWM program from July 2022 to February 2023    Review of History and Medications   Past Medical History:   Diagnosis Date    Bronchitis     Closed disp fx of styloid process of left radius with routine healing     Last Assessed 12Mar2017    Depression     HIV (human immunodeficiency virus infection) ([facility])     Obesity, Class III, BMI 40-49.9 (morbid obesity) ([facility])     Psychiatric disorder     \"mood swings\"    Retained intrauterine contraceptive device (IUD) 2/15/2022    Seizure ([facility])  "    pt denies- blacksout     Past Surgical History:   Procedure Laterality Date     SECTION      [place] HYSTEROSCOPY BX ENDOMETRIUM&/POLYPC W/WO D&C N/A 02/15/2022    Procedure: DILATATION AND CURETTAGE (D&C) WITH HYSTEROSCOPY REMOVAL OF RETAINED IUD ARM;  Surgeon: Janet Millard MD;  [facility];  Service: Gynecology    NY INSERTION INTRAUTERINE DEVICE IUD N/A 02/15/2022    Procedure: INSERTION OF IUD MIRENA;  Surgeon: Janet Millard MD;  [facility]: [facility];  Service: Gynecology    WISDOM TOOTH EXTRACTION      all 4     Social History     Socioeconomic History    Marital status: Single     Spouse name: Not on file    Number of children: Not on file    Years of education: Not on file    Highest education level: Not on file   Occupational History    Not on file   Tobacco Use    Smoking status: Former     Current packs/day: 0.00     Types: Cigarettes     Quit date: 2010     Years since quittin.9    Smokeless tobacco: Current   Vaping Use    Vaping status: Some Days    Substances: Nicotine, Flavoring   Substance and Sexual Activity    Alcohol use: Not Currently     Comment: rarely    Drug use: No    Sexual activity: Yes     Birth control/protection: I.U.D., None     Comment: Paraguard   Other Topics Concern    Not on file   Social History Narrative    Not on file     Social Drivers of Health     Financial Resource Strain: Not on file   Food Insecurity: Not on file   Transportation Needs: Not on file   Physical Activity: Not on file   Stress: Not on file   Social Connections: Not on file   Intimate Partner Violence: Not on file   Housing Stability: Not on file       Current Outpatient Medications:     multivitamin (THERAGRAN) TABS, Take 1 tablet by mouth daily, Disp: , Rfl:     sertraline (ZOLOFT) 100 mg tablet, TAKE 1.5 TABLETS (150MG TOTAL) BY MOUTH DAILY, Disp: 135 tablet, Rfl: 1    Food Intake and Lifestyle Assessment   Food Intake Assessment completed via usual diet  recall  Breakfast: 9:30 am / 10:00 am coffee-32oz with sweetened creamer: hazelnut or caramel macchiato or pumpkin flavored  1-2 tbsp of PB   Snack: 0  Lunch: 12/ 1 pm - soup and crackers or pre-made bag of salad with chicken or PB & J on bread   Snack: none  Dinner: pt cooks: 5-6pm: pasta with chopped meat, tacos on Tuesday with vegetables corn on the cob ,mashed potatoes   Snack: chips gold fish crackers or pretzels   Beverage intake: water, crystal light and coffee  Protein supplement: none  Estimated protein intake per day: 30-60g  Estimated fluid intake per day: >64oz  Meals eaten away from home: 1 per week  Typical meal pattern: 2-3 meals per day and 0-2 snacks per day  Eating Behaviors: Consumption of high calorie/ high fat foods, Consumption of high calorie beverages, Frequent snacking/ grazing, and Craves sweet foods and savory food   Food allergies or intolerances: No Known Allergies FA  Cultural or Nondenominational considerations: dislikes seafood    Physical Assessment  Physical Activity  Types of exercise: None-house chores  Up and down stairs doing laundry    ( 5 times per week )  One hour before picking up the kids from school   Sometimes on weekends   Current physical limitations: none    Psychosocial Assessment   Support systems: lives with her two 8yo twin girls  Parents are her support   Lives on same property as her family  Socioeconomic factors: not currently working, twins in school  Does not receive food stamps, WIC, etc  Sleeps   Bedtime : 12 midnight  Wakes up 7 am     Nutrition Diagnosis  Diagnosis: Overweight / Obesity (NC-3.3), Excessive energy intake (NI-1.5), Inappropriate intake of carbohydrates (NI-5.8.3), and Undesirable food choices (NB-1.7)  Related to: Food and nutrition-related knowledge deficit, Physical inactivity, Excessive energy intake, and Inability or lack of desire to manage self-care  As Evidenced by: BMI >25, Excessive energy intake, and Unintentional weight gain  "    Nutrition Prescription: Recommend the following diet  1500 calories/ 65 grams protein     Interventions and Teaching   Discussed pre-op and post-op nutrition guidelines.       Patient educated and handouts provided.  Surgical changes to stomach / GI  Capacity of post-surgery stomach  Diet progression  Adequate hydration  Sugar and fat restriction to decrease \"dumping syndrome\"  Weight loss plateaus/ possibility of weight regain  Exercise  Suggestions for pre-op diet  Nutrition considerations after surgery  Protein supplements  Meal planning and preparation  Dietary and lifestyle changes  Possible problems with poor eating habits  Techniques for self monitoring and keeping daily food journal  Potential for food intolerance after surgery, and ways to deal with them including: lactose intolerance, nausea, reflux, vomiting, diarrhea, food intolerance, appetite changes, gas  Vitamin / Mineral supplementation of Multivitamin with minerals and Vitamin   Was taking vitamins and minerals, needs to  more.     Patient is not currently pregnant and doesn't desire to become pregnant a minimum of one year post-op    Barriers to learning: Desire/Motivation    Readiness to change: preparation    Prior research on procedure: discussed with provider and friends or family    Comprehension: needs reinforcement     Expected Compliance: fair    Recommendations  Pt is an appropriate candidate for surgery. Yes  Negative nicotine 2024  Will need repeat nicotine level 3 1/2 weeks prior to surgery date.     Workflow: (Incomplete in Bold):  Psych and/or D+A Clearance: n/a  Blood Work: ordered   PCP letter: done  Surgeon Appt: done   EGD: 2024  Cardiac Risk Assessment with EC  Sleep Studies: n/a  Nicotine test: neg, repeat due 3 1/2 weeks prior to surgery date   Pre-Operative Program: n/a  NAFLD Score Calculated: n/a  Hepatology consult: n/a  Under Initial Office weight: yes    Evaluation / Monitoring  Dietitian " to Monitor: Eating pattern as discussed Tolerance of nutrition prescription Body weight Lab values Physical activity Bowel pattern  Patient has been inconsistently food logging in the dayanna, most days under 1500 calories.  Needs to focus on increasing her protein intake. . She has increased her water intake to 48 oz of water per day or  more.  She has been tracking her steps to 3000 per day and eating off a smaller plate to reduce her portions.  Reviewed work flow with patient.     Goals  Eliminate sugar sweetened beverages, Food journal, Exercise 30 minutes 5 times per week, Complete lession plans 1-6, Eat 3 meals per day, and Eliminate mindless snacking  Food log 1500 calories/ 65 grams protein   Recommend an adult vitamin and mineral  and vitamin 2000 IU D3 per day   Practice not drinking before and during meals.     Time Spent:   30 minutes

## 2024-11-26 ENCOUNTER — TELEPHONE (OUTPATIENT)
Dept: BARIATRICS | Facility: CLINIC | Age: 34
End: 2024-11-26

## 2024-11-26 RX ORDER — DIPHENOXYLATE HYDROCHLORIDE AND ATROPINE SULFATE 2.5; .025 MG/1; MG/1
1 TABLET ORAL DAILY
Qty: 30 TABLET | Refills: 0 | Status: SHIPPED | OUTPATIENT
Start: 2024-11-26

## 2024-11-29 ENCOUNTER — TELEPHONE (OUTPATIENT)
Age: 34
End: 2024-11-29

## 2024-12-02 ENCOUNTER — APPOINTMENT (OUTPATIENT)
Dept: LAB | Facility: HOSPITAL | Age: 34
End: 2024-12-02
Attending: SURGERY
Payer: MEDICAID

## 2024-12-02 DIAGNOSIS — Z01.818 PRE-OPERATIVE CLEARANCE: ICD-10-CM

## 2024-12-02 DIAGNOSIS — E66.01 OBESITY, CLASS III, BMI 40-49.9 (MORBID OBESITY) (HCC): ICD-10-CM

## 2024-12-02 DIAGNOSIS — Z01.818 PRE-OP TESTING: ICD-10-CM

## 2024-12-02 DIAGNOSIS — R73.01 ELEVATED FASTING BLOOD SUGAR: ICD-10-CM

## 2024-12-02 LAB
ALBUMIN SERPL BCG-MCNC: 3.8 G/DL (ref 3.5–5)
ALP SERPL-CCNC: 67 U/L (ref 34–104)
ALT SERPL W P-5'-P-CCNC: 4 U/L (ref 7–52)
ANION GAP SERPL CALCULATED.3IONS-SCNC: 3 MMOL/L (ref 4–13)
AST SERPL W P-5'-P-CCNC: 18 U/L (ref 13–39)
BILIRUB SERPL-MCNC: 0.33 MG/DL (ref 0.2–1)
BUN SERPL-MCNC: 8 MG/DL (ref 5–25)
CALCIUM SERPL-MCNC: 9.6 MG/DL (ref 8.4–10.2)
CHLORIDE SERPL-SCNC: 105 MMOL/L (ref 96–108)
CO2 SERPL-SCNC: 30 MMOL/L (ref 21–32)
CREAT SERPL-MCNC: 0.72 MG/DL (ref 0.6–1.3)
ERYTHROCYTE [DISTWIDTH] IN BLOOD BY AUTOMATED COUNT: 13 % (ref 11.6–15.1)
EST. AVERAGE GLUCOSE BLD GHB EST-MCNC: 120 MG/DL
GFR SERPL CREATININE-BSD FRML MDRD: 109 ML/MIN/1.73SQ M
GLUCOSE P FAST SERPL-MCNC: 95 MG/DL (ref 65–99)
HBA1C MFR BLD: 5.8 %
HCT VFR BLD AUTO: 40.7 % (ref 34.8–46.1)
HGB BLD-MCNC: 12.8 G/DL (ref 11.5–15.4)
MCH RBC QN AUTO: 28.8 PG (ref 26.8–34.3)
MCHC RBC AUTO-ENTMCNC: 31.4 G/DL (ref 31.4–37.4)
MCV RBC AUTO: 92 FL (ref 82–98)
PLATELET # BLD AUTO: 212 THOUSANDS/UL (ref 149–390)
PMV BLD AUTO: 10.9 FL (ref 8.9–12.7)
POTASSIUM SERPL-SCNC: 4.4 MMOL/L (ref 3.5–5.3)
PROT SERPL-MCNC: 7.5 G/DL (ref 6.4–8.4)
RBC # BLD AUTO: 4.44 MILLION/UL (ref 3.81–5.12)
SODIUM SERPL-SCNC: 138 MMOL/L (ref 135–147)
WBC # BLD AUTO: 6.04 THOUSAND/UL (ref 4.31–10.16)

## 2024-12-02 PROCEDURE — 80053 COMPREHEN METABOLIC PANEL: CPT

## 2024-12-02 PROCEDURE — 36415 COLL VENOUS BLD VENIPUNCTURE: CPT

## 2024-12-02 PROCEDURE — 85027 COMPLETE CBC AUTOMATED: CPT

## 2024-12-02 PROCEDURE — 83036 HEMOGLOBIN GLYCOSYLATED A1C: CPT

## 2024-12-02 RX ORDER — SODIUM CHLORIDE 9 MG/ML
125 INJECTION, SOLUTION INTRAVENOUS CONTINUOUS
Status: CANCELLED | OUTPATIENT
Start: 2024-12-02

## 2024-12-04 ENCOUNTER — ANESTHESIA EVENT (OUTPATIENT)
Dept: GASTROENTEROLOGY | Facility: HOSPITAL | Age: 34
End: 2024-12-04
Payer: COMMERCIAL

## 2024-12-04 ENCOUNTER — ANESTHESIA (OUTPATIENT)
Dept: GASTROENTEROLOGY | Facility: HOSPITAL | Age: 34
End: 2024-12-04
Payer: COMMERCIAL

## 2024-12-04 ENCOUNTER — HOSPITAL ENCOUNTER (OUTPATIENT)
Dept: GASTROENTEROLOGY | Facility: HOSPITAL | Age: 34
Setting detail: OUTPATIENT SURGERY
Discharge: HOME | End: 2024-12-04
Attending: SURGERY
Payer: COMMERCIAL

## 2024-12-04 VITALS
OXYGEN SATURATION: 100 % | HEART RATE: 74 BPM | DIASTOLIC BLOOD PRESSURE: 64 MMHG | TEMPERATURE: 97.4 F | RESPIRATION RATE: 18 BRPM | HEIGHT: 66 IN | BODY MASS INDEX: 47.09 KG/M2 | SYSTOLIC BLOOD PRESSURE: 100 MMHG | WEIGHT: 293 LBS

## 2024-12-04 DIAGNOSIS — Z98.84 BARIATRIC SURGERY STATUS: ICD-10-CM

## 2024-12-04 LAB
EXT PREGNANCY TEST URINE: NEGATIVE
EXT. CONTROL: NORMAL

## 2024-12-04 PROCEDURE — 88305 TISSUE EXAM BY PATHOLOGIST: CPT | Performed by: PATHOLOGY

## 2024-12-04 PROCEDURE — 43239 EGD BIOPSY SINGLE/MULTIPLE: CPT | Performed by: SURGERY

## 2024-12-04 PROCEDURE — 81025 URINE PREGNANCY TEST: CPT | Performed by: SURGERY

## 2024-12-04 RX ORDER — GLYCOPYRROLATE 0.2 MG/ML
INJECTION INTRAMUSCULAR; INTRAVENOUS AS NEEDED
Status: DISCONTINUED | OUTPATIENT
Start: 2024-12-04 | End: 2024-12-04

## 2024-12-04 RX ORDER — LIDOCAINE HYDROCHLORIDE 10 MG/ML
INJECTION, SOLUTION EPIDURAL; INFILTRATION; INTRACAUDAL; PERINEURAL AS NEEDED
Status: DISCONTINUED | OUTPATIENT
Start: 2024-12-04 | End: 2024-12-04

## 2024-12-04 RX ORDER — ONDANSETRON 2 MG/ML
INJECTION INTRAMUSCULAR; INTRAVENOUS AS NEEDED
Status: DISCONTINUED | OUTPATIENT
Start: 2024-12-04 | End: 2024-12-04

## 2024-12-04 RX ORDER — SODIUM CHLORIDE 9 MG/ML
125 INJECTION, SOLUTION INTRAVENOUS CONTINUOUS
Status: DISCONTINUED | OUTPATIENT
Start: 2024-12-04 | End: 2024-12-08 | Stop reason: HOSPADM

## 2024-12-04 RX ORDER — PROPOFOL 10 MG/ML
INJECTION, EMULSION INTRAVENOUS AS NEEDED
Status: DISCONTINUED | OUTPATIENT
Start: 2024-12-04 | End: 2024-12-04

## 2024-12-04 RX ORDER — EPHEDRINE SULFATE 50 MG/ML
INJECTION INTRAVENOUS AS NEEDED
Status: DISCONTINUED | OUTPATIENT
Start: 2024-12-04 | End: 2024-12-04

## 2024-12-04 RX ADMIN — SODIUM CHLORIDE 125 ML/HR: 0.9 INJECTION, SOLUTION INTRAVENOUS at 11:09

## 2024-12-04 RX ADMIN — LIDOCAINE HYDROCHLORIDE 40 MG: 10 INJECTION, SOLUTION EPIDURAL; INFILTRATION; INTRACAUDAL; PERINEURAL at 11:45

## 2024-12-04 RX ADMIN — ONDANSETRON 4 MG: 2 INJECTION INTRAMUSCULAR; INTRAVENOUS at 11:34

## 2024-12-04 RX ADMIN — PROPOFOL 250 MG: 10 INJECTION, EMULSION INTRAVENOUS at 11:45

## 2024-12-04 RX ADMIN — GLYCOPYRROLATE 0.2 MG: 0.2 INJECTION, SOLUTION INTRAMUSCULAR; INTRAVENOUS at 11:34

## 2024-12-04 RX ADMIN — EPHEDRINE SULFATE 10 MG: 50 INJECTION INTRAVENOUS at 11:45

## 2024-12-04 NOTE — ANESTHESIA PREPROCEDURE EVALUATION
Procedure:  EGD    Relevant Problems   ANESTHESIA (within normal limits)      NEURO/PSYCH   (+) Depression, major, recurrent, moderate (HCC)   (+) Depression, recurrent (HCC)        Physical Exam    Airway    Mallampati score: I  TM Distance: >3 FB  Neck ROM: full     Dental   No notable dental hx     Cardiovascular  Cardiovascular exam normal    Pulmonary  Pulmonary exam normal     Other Findings  post-pubertal.      Anesthesia Plan  ASA Score- 3     Anesthesia Type- IV sedation with anesthesia with ASA Monitors.         Additional Monitors:     Airway Plan:            Plan Factors-    Chart reviewed.   Existing labs reviewed. Patient summary reviewed.                  Induction- intravenous.    Postoperative Plan-         Informed Consent- Anesthetic plan and risks discussed with patient.

## 2024-12-04 NOTE — ANESTHESIA POSTPROCEDURE EVALUATION
Post-Op Assessment Note    CV Status:  Stable    Pain management: adequate       Mental Status:  Alert and awake   Hydration Status:  Euvolemic   PONV Controlled:  Controlled   Airway Patency:  Patent     Post Op Vitals Reviewed: Yes    No anethesia notable event occurred.    Staff: Anesthesiologist           Last Filed PACU Vitals:  Vitals Value Taken Time   Temp 97.4 °F (36.3 °C) 12/04/24 1150   Pulse 74 12/04/24 1205   /64 12/04/24 1205   Resp 18 12/04/24 1205   SpO2 100 % 12/04/24 1205       Modified Wilmer:  Activity: 2 (12/4/2024 12:05 PM)  Respiration: 2 (12/4/2024 12:05 PM)  Circulation: 2 (12/4/2024 12:05 PM)  Consciousness: 1 (12/4/2024 12:05 PM)  Oxygen Saturation: 2 (12/4/2024 12:05 PM)  Modified Wilmer Score: 9 (12/4/2024 12:05 PM)

## 2024-12-04 NOTE — H&P
H&P EXAM - Outpatient Endoscopy  [facility]   Linda Oneill 34 y.o. female MRN: [id number]  Unit/Bed#:  Encounter: [id number]        Impression: Morbid obesity    Plan:Upper endoscopy and a biopsy to rule out H. Pylori    Chief Complaint: Morbid obesity and preoperative endoscopy    Physical Exam: Normal not in acute distress   Chest: Clear to auscultation   Heart: Normal S1 and S2

## 2024-12-06 ENCOUNTER — RESULTS FOLLOW-UP (OUTPATIENT)
Dept: OTHER | Facility: HOSPITAL | Age: 34
End: 2024-12-06

## 2024-12-06 DIAGNOSIS — Z01.812 BLOOD TESTS PRIOR TO TREATMENT OR PROCEDURE: ICD-10-CM

## 2024-12-06 DIAGNOSIS — F17.201 NICOTINE DEPENDENCE IN REMISSION, UNSPECIFIED NICOTINE PRODUCT TYPE: ICD-10-CM

## 2024-12-06 DIAGNOSIS — F17.291 OTHER TOBACCO PRODUCT NICOTINE DEPENDENCE IN REMISSION: ICD-10-CM

## 2024-12-06 DIAGNOSIS — R63.5 ABNORMAL WEIGHT GAIN: ICD-10-CM

## 2024-12-06 DIAGNOSIS — Z01.818 PRE-OPERATIVE CLEARANCE: Primary | ICD-10-CM

## 2024-12-06 DIAGNOSIS — R63.8 ABNORMAL CRAVING: ICD-10-CM

## 2024-12-06 DIAGNOSIS — E66.01 OBESITY, CLASS III, BMI 40-49.9 (MORBID OBESITY) (HCC): ICD-10-CM

## 2024-12-06 DIAGNOSIS — Z01.818 PRE-OP TESTING: ICD-10-CM

## 2024-12-06 DIAGNOSIS — F17.200 NICOTINE DEPENDENCE: ICD-10-CM

## 2024-12-06 DIAGNOSIS — F17.200 SMOKER: ICD-10-CM

## 2024-12-06 DIAGNOSIS — Z72.0 VAPES NICOTINE CONTAINING SUBSTANCE: ICD-10-CM

## 2024-12-06 DIAGNOSIS — Z82.49 FAMILY HISTORY OF ISCHEMIC HEART DISEASE (IHD): ICD-10-CM

## 2024-12-06 DIAGNOSIS — F33.1 DEPRESSION, MAJOR, RECURRENT, MODERATE (HCC): ICD-10-CM

## 2024-12-06 PROCEDURE — 88305 TISSUE EXAM BY PATHOLOGIST: CPT | Performed by: PATHOLOGY

## 2024-12-13 ENCOUNTER — TELEPHONE (OUTPATIENT)
Dept: CARDIOLOGY CLINIC | Facility: CLINIC | Age: 34
End: 2024-12-13

## 2024-12-13 NOTE — PROGRESS NOTES
Behavioral Health Follow Up Note:      Weight Check:  Surgeon: Dr. Yanna Gaines  Self Pay  RNY    Starting weight 290.5  #  Today's weight 300.2 #    Surgery month:  JAN-FEB  Surgery deadline: MAY    Mental health and wellness - Patient is appropriately making changes based off the information contained in the bariatric manual.  Patient is modifying behaviors and demonstrating adapting to change.   No nicotine use. Reports mental health is stable for bariatric surgery. Reviewed h-pylori results & treatments. Discussed anxiety regarding bariatric surgery. Discussed family members who are post bariatric surgery with success.     Eating behaviors - water- 80oz. Consuming 3 meals and some snacking (spoonful of peanut butter or 100 calories pretzels). Chews well, eat slow. Logging food on [employer] dayanna, does not measure food though. Practicing mindful eating.   Breakfast: bagel or muffin with coffee/SF creamer  Lunch: PBJ or eggs  Dinner: protein, starch, vegetables     Activity -  limited, walks dog in AM. Discussed increasing walking, using stairs more, playing with dog, dancing to music and work out videos.     Progress toward program requirements    Workflow:  Psych and/or D+A additional documentation  n/a  PCP Letter: 2/26/2024  Support Group: RECOMMENDED  Surgeon Appt.: 9/18/2024  EGD : 12/4/25  Cardiac Risk Assessment: needs-12/18/24  Sleep Studies: n/a  Bloodwork: ordered  Nicotine test: neg, repeat due 3 1/2 weeks prior to surgery date      Next weight check scheduled with RD. Encouraged to bring bariatric manual to next appt with RD.     Goals:  practice 30/60 rule and increase mindful movement to 10 minutes per day.   Continue following the bariatric recommendations to prepare for bariatric surgery

## 2024-12-16 ENCOUNTER — RESULTS FOLLOW-UP (OUTPATIENT)
Dept: OTHER | Facility: HOSPITAL | Age: 34
End: 2024-12-16

## 2024-12-16 DIAGNOSIS — A04.8 BACTERIAL INFECTION DUE TO H. PYLORI: Primary | ICD-10-CM

## 2024-12-17 ENCOUNTER — CLINICAL SUPPORT (OUTPATIENT)
Dept: BARIATRICS | Facility: CLINIC | Age: 34
End: 2024-12-17

## 2024-12-17 VITALS — BODY MASS INDEX: 49.2 KG/M2 | WEIGHT: 293 LBS

## 2024-12-17 DIAGNOSIS — E66.01 OBESITY, CLASS III, BMI 40-49.9 (MORBID OBESITY) (HCC): Primary | ICD-10-CM

## 2024-12-17 PROCEDURE — RECHECK

## 2024-12-17 RX ORDER — CLARITHROMYCIN 500 MG/1
500 TABLET ORAL EVERY 12 HOURS SCHEDULED
Qty: 28 TABLET | Refills: 0 | Status: SHIPPED | OUTPATIENT
Start: 2024-12-17 | End: 2024-12-31

## 2024-12-17 RX ORDER — AMOXICILLIN 500 MG/1
1000 TABLET, FILM COATED ORAL 2 TIMES DAILY
Qty: 56 TABLET | Refills: 0 | Status: SHIPPED | OUTPATIENT
Start: 2024-12-17 | End: 2024-12-31

## 2024-12-18 ENCOUNTER — TELEPHONE (OUTPATIENT)
Age: 34
End: 2024-12-18

## 2024-12-18 ENCOUNTER — OFFICE VISIT (OUTPATIENT)
Dept: CARDIOLOGY CLINIC | Facility: CLINIC | Age: 34
End: 2024-12-18

## 2024-12-18 ENCOUNTER — TELEPHONE (OUTPATIENT)
Dept: BARIATRICS | Facility: CLINIC | Age: 34
End: 2024-12-18

## 2024-12-18 VITALS
HEART RATE: 70 BPM | RESPIRATION RATE: 16 BRPM | HEIGHT: 66 IN | BODY MASS INDEX: 47.09 KG/M2 | WEIGHT: 293 LBS | OXYGEN SATURATION: 98 % | DIASTOLIC BLOOD PRESSURE: 62 MMHG | SYSTOLIC BLOOD PRESSURE: 98 MMHG

## 2024-12-18 DIAGNOSIS — Z01.810 PREOP CARDIOVASCULAR EXAM: Primary | ICD-10-CM

## 2024-12-18 DIAGNOSIS — E66.01 MORBID OBESITY DUE TO EXCESS CALORIES (HCC): ICD-10-CM

## 2024-12-18 PROCEDURE — 93000 ELECTROCARDIOGRAM COMPLETE: CPT | Performed by: INTERNAL MEDICINE

## 2024-12-18 PROCEDURE — 99214 OFFICE O/P EST MOD 30 MIN: CPT | Performed by: INTERNAL MEDICINE

## 2024-12-18 NOTE — PROGRESS NOTES
"CARDIOLOGY OFFICE VISIT  [facility]  [address] [place] [address]  Tel: [phone]      NAME: Linda Oneill  AGE: 34 y.o.  SEX: female  : 1990  MRN: [id number]      Chief Complaint:  Chief Complaint   Patient presents with    Follow-up         History of Present Illness:   Patient comes for preop cardiac risk assessment prior to bariatric surgery.  Pt denies chest pain / pressure, SOB, palpitations, lightheadedness, syncope, swelling feet, orthopnea, PND, claudication.    Denies history of CAD, CKD, CHF, DM, TIA, CVA    States she can easily climb 1 flight of steps without any issues    Past Medical History:  Past Medical History:   Diagnosis Date    Bronchitis     Closed disp fx of styloid process of left radius with routine healing     Last Assessed 2017    Depression     HIV (human immunodeficiency virus infection) ([facility])     Obesity, Class III, BMI 40-49.9 (morbid obesity) ([facility])     Psychiatric disorder     \"mood swings\"    Retained intrauterine contraceptive device (IUD) 2/15/2022    Seizure ([facility])     pt denies- blacksout         Past Surgical History:  Past Surgical History:   Procedure Laterality Date     SECTION      NY HYSTEROSCOPY BX ENDOMETRIUM&/POLYPC W/WO D&C N/A 02/15/2022    Procedure: DILATATION AND CURETTAGE (D&C) WITH HYSTEROSCOPY REMOVAL OF RETAINED IUD ARM;  Surgeon: Janet Millard MD;  [facility]: [facility];  Service: Gynecology    NY INSERTION INTRAUTERINE DEVICE IUD N/A 02/15/2022    Procedure: INSERTION OF IUD MIRENA;  Surgeon: Janet Millard MD;  [facility]: [facility];  Service: Gynecology    WISDOM TOOTH EXTRACTION      all 4         Family History:  Family History   Problem Relation Age of Onset    Hypertension Mother     Deep vein thrombosis Father     Obesity Daughter     Breast cancer Neg Hx     Ovarian cancer Neg Hx     Uterine cancer Neg Hx     Cervical cancer " Neg Hx          Social History:  Social History     Socioeconomic History    Marital status: Single     Spouse name: None    Number of children: None    Years of education: None    Highest education level: None   Occupational History    None   Tobacco Use    Smoking status: Former     Current packs/day: 0.00     Types: Cigarettes     Quit date: 2010     Years since quittin.9    Smokeless tobacco: Former   Vaping Use    Vaping status: Former    Substances: Nicotine, Flavoring   Substance and Sexual Activity    Alcohol use: Not Currently     Comment: rarely    Drug use: No    Sexual activity: Yes     Birth control/protection: I.U.D., None     Comment: Paraguard   Other Topics Concern    None   Social History Narrative    None     Social Drivers of Health     Financial Resource Strain: Not on file   Food Insecurity: Not on file   Transportation Needs: Not on file   Physical Activity: Not on file   Stress: Not on file   Social Connections: Not on file   Intimate Partner Violence: Not on file   Housing Stability: Not on file         Active Problems:  Patient Active Problem List   Diagnosis    Depression, major, recurrent, moderate (HCC)    Family history of ischemic heart disease (IHD)    Class 3 severe obesity due to excess calories without serious comorbidity with body mass index (BMI) of 45.0 to 49.9 in adult (HCC)    Primary insomnia    Depression, recurrent (HCC)         The following portions of the patient's history were reviewed and updated as appropriate: past medical history, past surgical history, past family history,  past social history, current medications, allergies and problem list.      Review of Systems:  Constitutional: Denies fever, chills  Eyes: Denies eye redness, eye discharge  ENT: Denies hearing loss, sneezing, nasal discharge, sore throat   Respiratory: Denies cough, expectoration, shortness of breath  Cardiovascular: Denies chest pain, palpitations, lower extremity  swelling  Gastrointestinal: Denies abdominal pain, nausea, vomiting, diarrhea  Genito-Urinary: Denies dysuria, incontinence  Musculoskeletal: Denies back pain, joint pain, muscle pain  Neurologic: Denies lightheadedness, syncope, headache, seizures  Endocrine: Denies polydipsia, temperature intolerance  Allergy and Immunology: Denies hives, insect bite sensitivity  Hematological and Lymphatic: Denies bleeding problems, swollen glands   Psychological: Denies depression, suicidal ideation, anxiety, panic  Dermatological: Denies pruritus, rash, skin lesion changes      Vitals:  Vitals:    12/18/24 0956   BP: 98/62   Pulse: 70   Resp: 16   SpO2: 98%       Body mass index is 49.16 kg/m².    Weight (last 2 days)       Date/Time Weight    12/18/24 0956 136 (300)              Physical Examination:  General: Patient is not in acute distress. Awake, alert, oriented in time, place and person. Responding to commands  Head: Normocephalic. Atraumatic  Eyes: Both pupils normal sized, round and reactive to light. Nonicteric  ENT: Normal external ear canals  Neck: Supple. JVP not raised. Trachea central. No thyromegaly  Lungs: Bilateral bronchovascular breath sounds with no crackles or rhonchi  Chest wall: No tenderness  Cardiovascular: RRR. S1 and S2 normal. No murmur, rub or gallop  Gastrointestinal: Abdomen soft, nontender. No guarding or rigidity. Liver and spleen not palpable. Bowel sounds present  Neurologic: Patient is awake, alert, oriented in time, place and person. Responding to commands. Moving all extremities  Integumentary:  No skin rash  Lymphatic: No cervical lymphadenopathy  Back: Symmetric. No CVA tenderness  Extremities: No clubbing, cyanosis or edema      Laboratory Results:  CBC with diff:   Lab Results   Component Value Date    WBC 6.04 12/02/2024    RBC 4.44 12/02/2024    HGB 12.8 12/02/2024    HCT 40.7 12/02/2024    MCV 92 12/02/2024    MCH 28.8 12/02/2024    RDW 13.0 12/02/2024     12/02/2024  "      CMP:  Lab Results   Component Value Date    CREATININE 0.72 12/02/2024    BUN 8 12/02/2024    K 4.4 12/02/2024     12/02/2024    CO2 30 12/02/2024    ALKPHOS 67 12/02/2024    ALT 4 (L) 12/02/2024    AST 18 12/02/2024       Lab Results   Component Value Date    HGBA1C 5.8 (H) 12/02/2024         Lipid Profile:   No results found for: \"CHOL\"  Lab Results   Component Value Date    HDL 35 (L) 03/20/2024    HDL 44 (L) 07/07/2022    HDL 50 08/25/2021     Lab Results   Component Value Date    LDLCALC 81 03/20/2024    LDLCALC 103 (H) 07/07/2022    LDLCALC 104 (H) 08/25/2021     Lab Results   Component Value Date    TRIG 173 (H) 03/20/2024    TRIG 79 07/07/2022    TRIG 110 08/25/2021       Results for orders placed during the hospital encounter of 05/28/24    Holter monitor    Interpretation Summary  INDICATIONS: Bradycardia    FINDINGS:  The patient had predominantly sinus rhythm.  Heart rate varied from 47 bpm to 156 bpm.  The patient’s average heart rate was 82 bpm.  The patient had a holter monitor tracing done for 23 hours and 59 minutes.  The patient had 1 PVC.  No other ventricular events noted.  The patient had 2 PACs.  No other supraventricular events noted.  No episodes of bradycardia or pauses noted.  No cardiovascular symptoms reported by the patient.    Gretel Tejeda MD,FACC.      EKG: Reviewed by me.  12/18/2024.  Normal sinus rhythm.  Poor R wave progression in precordial leads      Medications:    Current Outpatient Medications:     amoxicillin (AMOXIL) 500 MG tablet, Take 2 tablets (1,000 mg total) by mouth 2 (two) times a day for 14 days, Disp: 56 tablet, Rfl: 0    clarithromycin (BIAXIN) 500 mg tablet, Take 1 tablet (500 mg total) by mouth every 12 (twelve) hours for 14 days, Disp: 28 tablet, Rfl: 0    multivitamin (THERAGRAN) TABS, Take 1 tablet by mouth daily, Disp: 30 tablet, Rfl: 0    omeprazole (PriLOSEC) 20 mg delayed release capsule, Take 1 capsule (20 mg total) by mouth 2 (two) times " a day for 14 days, Disp: 28 capsule, Rfl: 0    sertraline (ZOLOFT) 100 mg tablet, TAKE 1.5 TABLETS (150MG TOTAL) BY MOUTH DAILY, Disp: 135 tablet, Rfl: 1      Allergies:  No Known Allergies      Assessment and Plan:  1. Preop cardiovascular exam (Primary)  2. Morbid obesity due to excess calories (HCC)    Patient has no active cardiac conditions (such as unstable cardiac syndrome, decompensated heart failure, significant arrhythmias, severe valvular disease) and no clinical risk factors (such as CAD, compensated or prior heart failure, diabetes mellitus, renal insufficiency, CVA).  Patient can comfortably go up and down one flight of steps which is 4 METS.  Patient is a low cardiac risk patient going in for a bariatric surgery.  No further cardiac workup is needed at this time.  No cardiac contraindications for surgery.      Previous studies were reviewed.    Safety measures were reviewed.  Questions were entertained and answered.  Patient was advised to report any problems requiring medical attention.    Follow-up with PCP and appropriate specialist and lab work as discussed.    Return for follow up visit as scheduled or earlier, if needed.  Thank you for allowing me to participate in the care and evaluation of your patient.  Should you have any questions, please feel free to contact me.    Eneida Hensley MD  12/18/2024,10:28 AM

## 2024-12-18 NOTE — TELEPHONE ENCOUNTER
Patient called in to say her cardio pre op clearance appt went well and she is ready to schedule her surgery.  Also, she will be self pay for the surgery and would like to make payment arrangements.

## 2024-12-18 NOTE — TELEPHONE ENCOUNTER
Patient called in to say her cardio pre op clearance appt went well and she is ready to schedule her surgery.  Also, she will be self pay for the surgery and would like to make payment arrangements.       Patient was called to discuss dates will need to speak with her father she will call me back tomorrow explained I am leaving the office today @ 12:30.

## 2024-12-19 ENCOUNTER — PREP FOR PROCEDURE (OUTPATIENT)
Dept: BARIATRICS | Facility: CLINIC | Age: 34
End: 2024-12-19

## 2024-12-19 DIAGNOSIS — E66.01 MORBID OBESITY (HCC): Primary | ICD-10-CM

## 2024-12-23 ENCOUNTER — APPOINTMENT (OUTPATIENT)
Dept: LAB | Facility: HOSPITAL | Age: 34
End: 2024-12-23
Attending: SURGERY
Payer: MEDICAID

## 2024-12-23 DIAGNOSIS — F17.200 NICOTINE DEPENDENCE: ICD-10-CM

## 2024-12-23 DIAGNOSIS — F17.200 SMOKER: ICD-10-CM

## 2024-12-23 DIAGNOSIS — Z01.818 PRE-OPERATIVE CLEARANCE: ICD-10-CM

## 2024-12-23 DIAGNOSIS — Z82.49 FAMILY HISTORY OF ISCHEMIC HEART DISEASE (IHD): ICD-10-CM

## 2024-12-23 DIAGNOSIS — E66.01 OBESITY, CLASS III, BMI 40-49.9 (MORBID OBESITY) (HCC): ICD-10-CM

## 2024-12-23 DIAGNOSIS — F33.1 DEPRESSION, MAJOR, RECURRENT, MODERATE (HCC): ICD-10-CM

## 2024-12-23 DIAGNOSIS — R63.5 ABNORMAL WEIGHT GAIN: ICD-10-CM

## 2024-12-23 DIAGNOSIS — F17.291 OTHER TOBACCO PRODUCT NICOTINE DEPENDENCE IN REMISSION: ICD-10-CM

## 2024-12-23 DIAGNOSIS — F17.201 NICOTINE DEPENDENCE IN REMISSION, UNSPECIFIED NICOTINE PRODUCT TYPE: ICD-10-CM

## 2024-12-23 DIAGNOSIS — Z01.818 PRE-OP TESTING: ICD-10-CM

## 2024-12-23 DIAGNOSIS — R63.8 ABNORMAL CRAVING: ICD-10-CM

## 2024-12-23 DIAGNOSIS — Z01.812 BLOOD TESTS PRIOR TO TREATMENT OR PROCEDURE: ICD-10-CM

## 2024-12-23 DIAGNOSIS — Z72.0 VAPES NICOTINE CONTAINING SUBSTANCE: ICD-10-CM

## 2024-12-23 PROCEDURE — 80323 ALKALOIDS NOS: CPT

## 2024-12-24 NOTE — PROGRESS NOTES
Bariatric Nutrition Assessment Note       Patient's name and  were verified during visit. Pt present in a state that I hold active licensure.   Provider explained that [employer] is a HIPPA compliant platform and to further protect their confidentiality the provider was alone and the office door was closed. Patient consented to the virtual video visit Patient consented to the [employer] visit.  This visit is free.     Type of surgery    Preop gastric bypass   Surgery Date: 2025  Surgeon: Dr. Yanna Gaines    Nutrition Assessment   Linda Oneill  34 y.o.  female   Wt with BMI of 25: 152.57lbs  Pre-Op Excess Wt: 146.23lbs  LMP  (LMP Unknown)      Wt Readings from Last 3 Encounters:   24 136 kg (300 lb)   24 (!) 136 kg (300 lb 3.2 oz)   24 134 kg (295 lb)      + 1.3# x 3 months     [place] Equation:    BMR= 2072kcal  Weight Maintenance: 2486kcal  Estimated calories for weight loss 1486-1986kcal ( 1-2# per wk wt loss - sedentary )  Estimated protein needs 69.35-104g/day (1.0-1.5 gms/kg IBW )   Estimated fluid needs 2081-2427ml (30-35 ml/kg IBW )      Weight History   Onset of Obesity: Adult  Family history of obesity: Yes: father, mother, brother, sister all had WLS at [facility]- all doing well  Wt Loss Attempts: Commercial Programs (Weight Watchers, [employer], etc.)  Counseling with  MD  Meal Replacements (Medifast, Slim Fast, etc.)  Nutrition Counseling with RD  Self Created Diets (Portion Control, Healthy Food Choices, etc.)  Patient has tried the above for 6 months or more with insufficient weight loss or weight regain, which is why patient has requested to be evaluated for weight loss surgery today  Maximum Wt Lost: 60lbs on nutrisystem in   Pt had previously worked with [facility] surgical weight loss program from 2021 to 2022, then worked with [facility] MWM program from 2022 to 2023    Review of History and Medications   Past Medical History:   Diagnosis Date  "   Bronchitis     Closed disp fx of styloid process of left radius with routine healing     Last Assessed 2017    Depression     HIV (human immunodeficiency virus infection) ([facility])     Obesity, Class III, BMI 40-49.9 (morbid obesity) ([facility])     Psychiatric disorder     \"mood swings\"    Retained intrauterine contraceptive device (IUD) 2/15/2022    Seizure ([facility])     pt denies- blacksout     Past Surgical History:   Procedure Laterality Date     SECTION      ND HYSTEROSCOPY BX ENDOMETRIUM&/POLYPC W/WO D&C N/A 02/15/2022    Procedure: DILATATION AND CURETTAGE (D&C) WITH HYSTEROSCOPY REMOVAL OF RETAINED IUD ARM;  Surgeon: Janet Millard MD;  [facility];  Service: Gynecology    ND INSERTION INTRAUTERINE DEVICE IUD N/A 02/15/2022    Procedure: INSERTION OF IUD MIRENA;  Surgeon: Janet Millard MD;  [facility];  Service: Gynecology    WISDOM TOOTH EXTRACTION      all 4     Social History     Socioeconomic History    Marital status: Single     Spouse name: Not on file    Number of children: Not on file    Years of education: Not on file    Highest education level: Not on file   Occupational History    Not on file   Tobacco Use    Smoking status: Former     Current packs/day: 0.00     Types: Cigarettes     Quit date: 2010     Years since quittin.9    Smokeless tobacco: Former   Vaping Use    Vaping status: Former    Substances: Nicotine, Flavoring   Substance and Sexual Activity    Alcohol use: Not Currently     Comment: rarely    Drug use: No    Sexual activity: Yes     Birth control/protection: I.U.D., None     Comment: Paraguard   Other Topics Concern    Not on file   Social History Narrative    Not on file     Social Drivers of Health     Financial Resource Strain: Not on file   Food Insecurity: Not on file   Transportation Needs: Not on file   Physical Activity: Not on file   Stress: Not on file   Social Connections: Not on file   Intimate Partner Violence: Not on " file   Housing Stability: Not on file       Current Outpatient Medications:     amoxicillin (AMOXIL) 500 MG tablet, Take 2 tablets (1,000 mg total) by mouth 2 (two) times a day for 14 days, Disp: 56 tablet, Rfl: 0    clarithromycin (BIAXIN) 500 mg tablet, Take 1 tablet (500 mg total) by mouth every 12 (twelve) hours for 14 days, Disp: 28 tablet, Rfl: 0    multivitamin (THERAGRAN) TABS, Take 1 tablet by mouth daily, Disp: 30 tablet, Rfl: 0    omeprazole (PriLOSEC) 20 mg delayed release capsule, Take 1 capsule (20 mg total) by mouth 2 (two) times a day for 14 days, Disp: 28 capsule, Rfl: 0    sertraline (ZOLOFT) 100 mg tablet, TAKE 1.5 TABLETS (150MG TOTAL) BY MOUTH DAILY, Disp: 135 tablet, Rfl: 1    Food Intake and Lifestyle Assessment   Food Intake Assessment completed via usual diet recall  Breakfast: 9:30 am / 10:00 am coffee-32oz with sweetened creamer: hazelnut or caramel macchiato or pumpkin flavored  1-2 tbsp of PB   Snack: 0  Lunch: 12/ 1 pm - soup and crackers or pre-made bag of salad with chicken or PB & J on bread   Snack: none  Dinner: pt cooks: 5-6pm: pasta with chopped meat, tacos on Tuesday with vegetables corn on the cob ,mashed potatoes   Snack: chips gold fish crackers or pretzels   Beverage intake: water, crystal light and coffee  Protein supplement: none  Estimated protein intake per day: 30-60g  Estimated fluid intake per day: >64oz  Meals eaten away from home: 1 per week  Typical meal pattern: 2-3 meals per day and 0-2 snacks per day  Eating Behaviors: Consumption of high calorie/ high fat foods, Consumption of high calorie beverages, Frequent snacking/ grazing, and Craves sweet foods and savory food   Food allergies or intolerances: No Known Allergies NKFA  Cultural or Judaism considerations: dislikes seafood    Physical Assessment  Physical Activity  Types of exercise: None-house chores  Up and down stairs doing laundry    ( 5 times per week )  One hour before picking up the kids from school  "  Sometimes on weekends   Current physical limitations: none    Psychosocial Assessment   Support systems: lives with her two 8yo twin girls  Parents are her support   Lives on same property as her family  Socioeconomic factors: not currently working, twins in school  Does not receive food stamps, WIC, etc  Sleeps   Bedtime : 12 midnight  Wakes up 7 am     Nutrition Diagnosis  Diagnosis: Overweight / Obesity (NC-3.3), Excessive energy intake (NI-1.5), Inappropriate intake of carbohydrates (NI-5.8.3), and Undesirable food choices (NB-1.7)  Related to: Food and nutrition-related knowledge deficit, Physical inactivity, Excessive energy intake, and Inability or lack of desire to manage self-care  As Evidenced by: BMI >25, Excessive energy intake, and Unintentional weight gain     Nutrition Prescription: Recommend the following diet  Pre op liver shrinking diet   800 calories/ 120-130 grams protein /<45 grams of carbohdyrate   80 ounces of calorie free beverages     Interventions and Teaching   Discussed pre-op and post-op nutrition guidelines.       Patient educated and handouts provided.  Surgical changes to stomach / GI  Capacity of post-surgery stomach  Diet progression  Adequate hydration  Sugar and fat restriction to decrease \"dumping syndrome\"  Weight loss plateaus/ possibility of weight regain  Exercise  Suggestions for pre-op diet  Nutrition considerations after surgery  Protein supplements  Meal planning and preparation  Dietary and lifestyle changes  Possible problems with poor eating habits  Techniques for self monitoring and keeping daily food journal  Potential for food intolerance after surgery, and ways to deal with them including: lactose intolerance, nausea, reflux, vomiting, diarrhea, food intolerance, appetite changes, gas  Vitamin / Mineral supplementation of Multivitamin with minerals and Vitamin   Was taking vitamins and minerals, needs to  more.   Patient is not currently pregnant and " doesn't desire to become pregnant a minimum of one year post-op    Pt. educated on the pre operative liver shrinking diet.  Pt understands that the diet needs to be followed for 2 weeks prior to surgery. Handout reviewed and emailed .   Emphasized the need to drink 80 ounces of fluid per day while on the diet. Pt understands that she will be weighed at her final history and physical        Barriers to learning: none noted     Readiness to change: action     Prior research on procedure: discussed with provider and friends or family    Comprehension: needs reinforcement     Expected Compliance: fair    Recommendations  Pt is an appropriate candidate for surgery. Yes  Negative nicotine 8/29/2024  Will need repeat nicotine level 3 1/2 weeks prior to surgery date.       Evaluation / Monitoring  Dietitian to Monitor: Eating pattern as discussed Tolerance of nutrition prescription Body weight Lab values Physical activity Bowel pattern    Goals  Eliminate sugar sweetened beverages, Food journal, Exercise 30 minutes 5 times per week, Complete lession plans 1-6, Eat 3 meals per day, and Eliminate mindless snacking  Follow pre op liver shrinking diet starting today until surgery date   Stop all solid food ( vegetables ) 48 hours prior to surgery date.       Time Spent:   30 minutes

## 2024-12-26 ENCOUNTER — TELEMEDICINE (OUTPATIENT)
Dept: BARIATRICS | Facility: CLINIC | Age: 34
End: 2024-12-26

## 2024-12-26 DIAGNOSIS — E66.01 OBESITY, CLASS III, BMI 40-49.9 (MORBID OBESITY) (HCC): Primary | ICD-10-CM

## 2024-12-26 PROCEDURE — RECHECK: Performed by: DIETITIAN, REGISTERED

## 2024-12-30 LAB
COTININE SERPL-MCNC: 87.9 NG/ML
NICOTINE SERPL-MCNC: 1.5 NG/ML

## 2025-01-02 ENCOUNTER — CLINICAL SUPPORT (OUTPATIENT)
Dept: BARIATRICS | Facility: CLINIC | Age: 35
End: 2025-01-02

## 2025-01-02 ENCOUNTER — TELEPHONE (OUTPATIENT)
Age: 35
End: 2025-01-02

## 2025-01-02 DIAGNOSIS — Z01.818 PRE-OP TESTING: Primary | ICD-10-CM

## 2025-01-02 DIAGNOSIS — E66.01 OBESITY, CLASS III, BMI 40-49.9 (MORBID OBESITY) (HCC): Primary | ICD-10-CM

## 2025-01-02 PROCEDURE — RECHECK: Performed by: DIETITIAN, REGISTERED

## 2025-01-02 NOTE — TELEPHONE ENCOUNTER
Pt thought her appt with Kay was today and cancelled it in error .  It was for 01/23/25.  Please call her back to reschedule with Kay. Pt did ask if she had any morning appts. Please call her at [phone]

## 2025-01-20 DIAGNOSIS — F33.1 DEPRESSION, MAJOR, RECURRENT, MODERATE (HCC): ICD-10-CM

## 2025-01-22 ENCOUNTER — TELEPHONE (OUTPATIENT)
Age: 35
End: 2025-01-22

## 2025-01-22 NOTE — TELEPHONE ENCOUNTER
Patient called in to confirm the nicotine lab test was put into the system, advised the test was entered in on 1/02/25

## 2025-01-22 NOTE — PROGRESS NOTES
Bariatric Nutrition Assessment Note     Type of surgery    Preop gastric bypass   Surgery Date: was scheduled for 1/14/2025-postponed indefinitely due to positive nicotine blood test  Surgeon: Dr. Yanna Gaines  Pt attended pre-op class 1/02/2025    Nutrition Assessment   Linda Oneill  34 y.o.  female   Wt with BMI of 25: 152.57lbs  Pre-Op Excess Wt: 146.23lbs  9/18/2024 Initial Surgeon consult: 290.5lbs  There were no vitals taken for this visit.   Wt Readings from Last 3 Encounters:   12/18/24 136 kg (300 lb)   12/17/24 (!) 136 kg (300 lb 3.2 oz)   12/04/24 134 kg (295 lb)     Score cannot be calculated. In patient's <= 35 years old, there is poor performance in regards to estimating degree of fibrosis.    [place] Equation:    BMR= 2072kcal  Weight Maintenance: 2486kcal  Estimated calories for weight loss 1486-1986kcal ( 1-2# per wk wt loss - sedentary )  Estimated protein needs 69.35-104g/day (1.0-1.5 gms/kg IBW )   Estimated fluid needs 2081-2427ml (30-35 ml/kg IBW )      Weight History   Onset of Obesity: Adult  Family history of obesity: Yes: father, mother, brother, sister all had WLS at [facility]- all doing well  Wt Loss Attempts: Commercial Programs (Weight Watchers, [place], etc.)  Counseling with  MD  Meal Replacements (Medifast, Slim Fast, etc.)  Nutrition Counseling with RD  Self Created Diets (Portion Control, Healthy Food Choices, etc.)  Patient has tried the above for 6 months or more with insufficient weight loss or weight regain, which is why patient has requested to be evaluated for weight loss surgery today  Maximum Wt Lost: 60lbs on nutrisystem in 2009  Pt had previously worked with [facility] surgical weight loss program from March 2021 to June 2022, then worked with [facility] MWM program from July 2022 to February 2023    Review of History and Medications   Past Medical History:   Diagnosis Date    Bronchitis     Closed disp fx of styloid process of left radius with routine healing      "Last Assessed 2017    Depression     HIV (human immunodeficiency virus infection) ([facility])     Obesity, Class III, BMI 40-49.9 (morbid obesity) ([facility])     Psychiatric disorder     \"mood swings\"    Retained intrauterine contraceptive device (IUD) 2/15/2022    Seizure ([facility])     pt denies- blacksout     Past Surgical History:   Procedure Laterality Date     SECTION      OK HYSTEROSCOPY BX ENDOMETRIUM&/POLYPC W/WO D&C N/A 02/15/2022    Procedure: DILATATION AND CURETTAGE (D&C) WITH HYSTEROSCOPY REMOVAL OF RETAINED IUD ARM;  Surgeon: Janet Millard MD;  [facility];  Service: Gynecology    OK INSERTION INTRAUTERINE DEVICE IUD N/A 02/15/2022    Procedure: INSERTION OF IUD MIRENA;  Surgeon: Janet Millard MD;  [facility];  Service: Gynecology    WISDOM TOOTH EXTRACTION      all 4     Social History     Socioeconomic History    Marital status: Single     Spouse name: Not on file    Number of children: Not on file    Years of education: Not on file    Highest education level: Not on file   Occupational History    Not on file   Tobacco Use    Smoking status: Former     Current packs/day: 0.00     Types: Cigarettes     Quit date: 2010     Years since quitting: 15.0    Smokeless tobacco: Former   Vaping Use    Vaping status: Former    Substances: Nicotine, Flavoring   Substance and Sexual Activity    Alcohol use: Not Currently     Comment: rarely    Drug use: No    Sexual activity: Yes     Birth control/protection: I.U.D., None     Comment: Paraguard   Other Topics Concern    Not on file   Social History Narrative    Not on file     Social Drivers of Health     Financial Resource Strain: Not on file   Food Insecurity: Not on file   Transportation Needs: Not on file   Physical Activity: Not on file   Stress: Not on file   Social Connections: Not on file   Intimate Partner Violence: Not on file   Housing Stability: Not on file       Current Outpatient Medications:     multivitamin " (THERAGRAN) TABS, Take 1 tablet by mouth daily, Disp: 30 tablet, Rfl: 0    omeprazole (PriLOSEC) 20 mg delayed release capsule, Take 1 capsule (20 mg total) by mouth 2 (two) times a day for 14 days, Disp: 28 capsule, Rfl: 0    sertraline (ZOLOFT) 100 mg tablet, TAKE 1.5 TABLETS (150MG TOTAL) BY MOUTH DAILY, Disp: 135 tablet, Rfl: 1    Food Intake and Lifestyle Assessment   Food Intake Assessment completed via usual diet recall  Breakfast: 9:30 am / 10:00 am coffee-32oz with sweetened creamer: hazelnut or caramel macchiato or pumpkin flavored  1-2 tbsp of PB   Snack: 0  Lunch: 12/ 1 pm - soup and crackers or pre-made bag of salad with chicken or PB & J on bread   Snack: none  Dinner: pt cooks: 5-6pm: pasta with chopped meat, tacos on Tuesday with vegetables corn on the cob ,mashed potatoes   Snack: chips gold fish crackers or pretzels   Beverage intake: water, crystal light and coffee  Protein supplement: none  Estimated protein intake per day: 30-60g  Estimated fluid intake per day: >64oz  Meals eaten away from home: 1 per week  Typical meal pattern: 2-3 meals per day and 0-2 snacks per day  Eating Behaviors: Consumption of high calorie/ high fat foods, Consumption of high calorie beverages, Frequent snacking/ grazing, and Craves sweet foods and savory food   Food allergies or intolerances: No Known Allergies NKFA  Cultural or Quaker considerations: dislikes seafood    Physical Assessment  Physical Activity  Types of exercise: None-house chores  Up and down stairs doing laundry    ( 5 times per week )  One hour before picking up the kids from school   Sometimes on weekends   Current physical limitations: none    Psychosocial Assessment   Support systems: lives with her two 8yo twin girls  Parents are her support   Lives on same property as her family  Socioeconomic factors: not currently working, twins in school  Does not receive food stamps, WIC, etc  Sleeps   Bedtime : 12 midnight  Wakes up 7 am     Nutrition  "Diagnosis-continued  Diagnosis: Overweight / Obesity (NC-3.3), Excessive energy intake (NI-1.5), Inappropriate intake of carbohydrates (NI-5.8.3), and Undesirable food choices (NB-1.7)  Related to: Food and nutrition-related knowledge deficit, Physical inactivity, Excessive energy intake, and Inability or lack of desire to manage self-care  As Evidenced by: BMI >25, Excessive energy intake, and Unintentional weight gain     Nutrition Prescription: Recommend the following diet  Pre op liver shrinking diet   800 calories/ 120-130 grams protein /<45 grams of carbohdyrate   80 ounces of calorie free beverages     Interventions and Teaching   Discussed pre-op and post-op nutrition guidelines  Patient educated and handouts provided  Surgical changes to stomach / GI  Capacity of post-surgery stomach  Diet progression  Adequate hydration  Sugar and fat restriction to decrease \"dumping syndrome\"  Weight loss plateaus/ possibility of weight regain  Exercise  Suggestions for pre-op diet  Nutrition considerations after surgery  Protein supplements  Meal planning and preparation  Dietary and lifestyle changes  Possible problems with poor eating habits  Techniques for self monitoring and keeping daily food journal  Potential for food intolerance after surgery, and ways to deal with them including: lactose intolerance, nausea, reflux, vomiting, diarrhea, food intolerance, appetite changes, gas  Vitamin / Mineral supplementation of Multivitamin with minerals and Vitamin   Was taking vitamins and minerals, needs to  more.   Patient is not currently pregnant and doesn't desire to become pregnant a minimum of one year post-op    Pt. educated on the pre operative liver shrinking diet.  Pt understands that the diet needs to be followed for 2 weeks prior to surgery. Handout reviewed and emailed .   Emphasized the need to drink 80 ounces of fluid per day while on the diet. Pt understands that she will be weighed at her final " history and physical      Barriers to learning: none noted   Readiness to change: action   Prior research on procedure: discussed with provider and friends or family  Comprehension: needs reinforcement   Expected Compliance: fair    Recommendations  Pt is an appropriate candidate for surgery. Yes    Workflow: (Incomplete in Bold):  Psych and/or D+A Clearance: N/A  Blood Work:   12/02/24: CBC, CMP, HgbA1c done  3/20/24: Lipid panel + TSH done  PCP letter: done 2/26/24  Surgeon Appt: done 9/18/24  EGD: done 12/4/24  Cardiac Risk Assessment with ECG: done 12/18/24  Sleep Studies: negative stopbang  Nicotine test:   8/29/2024- negative test  12/23/2024- positive test-surgery cancelled  Third nicotine test ordered 1/2/25 to be done within the next month  Pre-Operative Program: N/A- self pay  NAFLD Score Calculated: Score cannot be calculated. In patient's <= 35 years old, there is poor performance in regards to estimating degree of fibrosis.  Hepatology consult: N/A  Under Initial Office weight: 9/18/2024 Initial Surgeon consult: 290.5lbs      Evaluation / Monitoring  Dietitian to Monitor: Eating pattern as discussed Tolerance of nutrition prescription Body weight Lab values Physical activity Bowel pattern    Goals  Eliminate sugar sweetened beverages, Food journal, Exercise 30 minutes 5 times per week, Complete lession plans 1-6, Eat 3 meals per day, and Eliminate mindless snacking  Follow pre op liver shrinking diet starting today until surgery date   Stop all solid food ( vegetables ) 48 hours prior to surgery date.     Time Spent:   30 minutes

## 2025-01-23 ENCOUNTER — CLINICAL SUPPORT (OUTPATIENT)
Dept: BARIATRICS | Facility: CLINIC | Age: 35
End: 2025-01-23

## 2025-01-23 VITALS — BODY MASS INDEX: 48.33 KG/M2 | WEIGHT: 293 LBS

## 2025-01-23 DIAGNOSIS — E66.01 CLASS 3 SEVERE OBESITY DUE TO EXCESS CALORIES WITHOUT SERIOUS COMORBIDITY WITH BODY MASS INDEX (BMI) OF 45.0 TO 49.9 IN ADULT (HCC): Primary | ICD-10-CM

## 2025-01-23 DIAGNOSIS — E66.813 CLASS 3 SEVERE OBESITY DUE TO EXCESS CALORIES WITHOUT SERIOUS COMORBIDITY WITH BODY MASS INDEX (BMI) OF 45.0 TO 49.9 IN ADULT (HCC): Primary | ICD-10-CM

## 2025-01-23 PROCEDURE — RECHECK

## 2025-01-23 NOTE — PROGRESS NOTES
Behavioral Health Follow Up Note:      Weight Check:  Surgeon: Dr. Yanna Gaines  Surgery 1/14/25 was canceled due to positive nioctine test-plans to be tested tomorrow.  Test due by 2/2/25; otherwise pt will be halted & referred to medical  [facility]    Starting weight 290.5  #   Today's weight 294.9  #    Mental health and wellness - Patient is appropriately making changes based off the information contained in the bariatric manual.  Patient is modifying behaviors and demonstrating adapting to change.   Reports last nicotine use was 12/23/24-pt shows understanding of negative nicotine test due 2/2/25. Endorses some anxiety and nerves about staying nicotine free and with bariatric surgery. Coping skills-music, chew gum, talks to family.  Has been reading bariatric manual-no questions reported.     Eating behaviors - consumes 2 meals daily, meals-protein shake & salad. Water- about 80oz.     Activity -  playing with nieces, stays active with them. Walks dog daily.     Progress toward program requirements    Workflow completed except negative nicotine test due 2/2/25  Linda understands if she misses this deadline or tests postivie, she will be halted from surgical track and can be referred to [facility].      Next month weight check scheduled with RD.     Goals:  complete nicotine test. Continue following the bariatric recommendations to prepare for bariatric surgery

## 2025-01-24 ENCOUNTER — APPOINTMENT (OUTPATIENT)
Dept: LAB | Facility: HOSPITAL | Age: 35
End: 2025-01-24
Attending: STUDENT IN AN ORGANIZED HEALTH CARE EDUCATION/TRAINING PROGRAM
Payer: COMMERCIAL

## 2025-01-24 DIAGNOSIS — Z01.818 PRE-OP TESTING: ICD-10-CM

## 2025-01-24 PROCEDURE — 80323 ALKALOIDS NOS: CPT

## 2025-01-30 LAB
COTININE SERPL-MCNC: 35.2 NG/ML
NICOTINE SERPL-MCNC: <1 NG/ML

## 2025-01-30 RX ORDER — SERTRALINE HYDROCHLORIDE 100 MG/1
150 TABLET, FILM COATED ORAL DAILY
Qty: 135 TABLET | Refills: 0 | Status: SHIPPED | OUTPATIENT
Start: 2025-01-30

## 2025-01-30 NOTE — TELEPHONE ENCOUNTER
Requested medication(s) are due for refill today: Yes  Patient has already received a courtesy refill: No  Other reason request has been forwarded to provider: Per protocol

## 2025-02-03 ENCOUNTER — TELEPHONE (OUTPATIENT)
Dept: BARIATRICS | Facility: CLINIC | Age: 35
End: 2025-02-03

## 2025-02-03 NOTE — TELEPHONE ENCOUNTER
Sw contacted Linda regarding positive nicotine test result 1/24/25: VM left with information, requested return call to discuss further if there are any questions or concerns. Detailed message discussed: 2nd positive nicotine test, importance of being nicotine post op, increased risk of ulcers, MWM for minimium one year plus negative nicotine test required to return to program. Detailed message of being halted, pt can start with MWM if desired. Reminded pt of letter with these details being sent to her as well.  will contact hospital regarding refund as pt was self pay. Encouraged pt to call  directly with office and main contact provided if there are questions.

## 2025-02-03 NOTE — TELEPHONE ENCOUNTER
Pt returned Sw call to discuss being halted from the surgical track. Pt states she did not smoke nicotine recently and is unsure why test was positive. Sw explained again importance of being nicotine free post op. Pt agreeable tos tart MWM for minimum of one year plus obtain negative nicotine test after that time to restart surgical program. Call ended mutually.

## 2025-02-05 ENCOUNTER — TELEPHONE (OUTPATIENT)
Age: 35
End: 2025-02-05

## 2025-02-05 ENCOUNTER — OFFICE VISIT (OUTPATIENT)
Dept: BARIATRICS | Facility: CLINIC | Age: 35
End: 2025-02-05
Payer: COMMERCIAL

## 2025-02-05 VITALS
SYSTOLIC BLOOD PRESSURE: 120 MMHG | WEIGHT: 293 LBS | HEIGHT: 65 IN | HEART RATE: 69 BPM | DIASTOLIC BLOOD PRESSURE: 82 MMHG | BODY MASS INDEX: 48.82 KG/M2 | RESPIRATION RATE: 16 BRPM

## 2025-02-05 DIAGNOSIS — E66.01 CLASS 3 SEVERE OBESITY DUE TO EXCESS CALORIES WITHOUT SERIOUS COMORBIDITY WITH BODY MASS INDEX (BMI) OF 45.0 TO 49.9 IN ADULT (HCC): Primary | ICD-10-CM

## 2025-02-05 DIAGNOSIS — E66.813 CLASS 3 SEVERE OBESITY DUE TO EXCESS CALORIES WITHOUT SERIOUS COMORBIDITY WITH BODY MASS INDEX (BMI) OF 45.0 TO 49.9 IN ADULT (HCC): Primary | ICD-10-CM

## 2025-02-05 PROCEDURE — 99203 OFFICE O/P NEW LOW 30 MIN: CPT

## 2025-02-05 NOTE — TELEPHONE ENCOUNTER
Patient called in to speak with Letty De Jesus, told patient she was not in the office today.  She asked if she could give her a call when she is back in the office.  Thanks.

## 2025-02-05 NOTE — PROGRESS NOTES
Assessment/Plan:    Class 3 severe obesity due to excess calories without serious comorbidity with body mass index (BMI) of 45.0 to 49.9 in adult (HCC)  - Discussed options of HealthyCORE-Intensive Lifestyle Intervention Program, Very Low Calorie Diet-VLCD, Conservative Program, Keyur-En-Y Gastric Bypass, and Vertical Sleeve Gastrectomy and the role of weight loss medications.  - Patient is interested in pursuing Conservative Program and follow up visits with medical weight management provider.  - Explained the importance of making lifestyle changes in addition to starting any anti-obesity medications.   - Initial weight loss goal of 5-10% weight loss for improved health. Weight loss can improve patient's co-morbid conditions and/or prevent weight-related complications.  - Weight is not at goal and patient has been unable to achieve a meaningful weight loss above 5% using various programs and tools for more than 6 months  - Labs reviewed from 12/24    General Recommendations:  Nutrition:  Eat breakfast daily.  Do not skip meals.      Food log (ie.) www.Smart Voicemail.com, sparkpeople.com, loseit.com, calorieking.com, etc.     Practice mindful eating.  Be sure to set aside time to eat, eat slowly, and savor your food.     Hydration:    At least 64oz of water daily.  No sugar sweetened beverages.  No juice (eat the fruit instead).     Exercise:  Studies have shown that the ideal exercise goal is somewhere between 150 to 300 minutes of moderate intensity exercise a week.  Start with exercising 10 minutes every other day and gradually increase physical activity with a goal of at least 150 minutes of moderate intensity exercise a week, divided over at least 3 days a week.  An example of this would be exercising 30 minutes a day, 5 days a week.  Resistance training can increase muscle mass and increase our resting metabolic rate.   FULL BODY resistance training is recommended 2-3 times a week.  Do not do this on consecutive  days to allow for muscle recovery.     Aim for a bare minimum 5000 steps, even on days you do not exercise.     Monitoring:   Weigh yourself daily.  If this causes undue stress, then just weigh yourself once a week.  Weigh yourself the same time of the day with the same amount of clothing on.  Preferably this should be done after waking up, before you eat, and with no clothing or minimal clothing on.     Specific Goals:  Calorie goal:  9125-9365 jamie/day (Provided with meal plan to follow). Discussed her nutrition and lifestyle at length and she has been maintaining her diet recommendations for the surgical dietician. She has been over a month nicotine free and was referred here in the mean time to discuss weight loss options other than surgical until she can get back on track with the surgical program. Discussed medications and GLP1s are not covered by her insurance and she cannot afford them. Discussed oral medications and could consider phentermine or Topamax or metformin however she says she really doesn't have an issue with appetite control. Would caution Wellbutrin in the setting of her zoloft dosage and depression. She is more interested in getting back on the surgical program as she feel she has good control of her appetite and diet right now. Recommend re consult with Dr. Anand for a plan.   Encouraged to start tracking food.   Eat at least 3 meals per day with a focus on protein. Do not skip meals. Protein rich snacks discussed and protein shake options discussed.  Exercise 1-2 days per week to start for 10-15 minutes per day and increase from there and include strength training as able.   Patient denies personal and family history of  pancreatitis, thyroid cancer, MEN-2 tumors.  Denies any hx of glaucoma, seizures, kidney stones, gallstones, or gastroparesis.   Denies Hx of CAD, PAD, palpitations, arrhythmia.   Denies uncontrolled anxiety or depression, suicidal behavior or thinking.   Denies insomnia or  "sleep disturbance.           Linda was seen today for follow-up.    Diagnoses and all orders for this visit:    Class 3 severe obesity due to excess calories without serious comorbidity with body mass index (BMI) of 45.0 to 49.9 in adult ([facility])           Total time spent reviewing chart, interviewing patient, examining patient, discussing plan, answering all questions, and documentin min, with >50% face-to-face time spent counseling patient on nonsurgical interventions for the treatment of excess weight. Discussed in detail nonsurgical options including intensive lifestyle intervention program, very low-calorie diet program and conservative program.  Discussed the role of weight loss medications.  Counseled patient on diet behavior and exercise modification for weight loss.    Follow up in approximately 3 months with Bariatric Surgeon.    Subjective:   Chief Complaint   Patient presents with    Follow-up       Patient ID: Linda Oneill  is a 34 y.o. female with excess weight/obesity here to pursue weight management.  Previous notes and records have been reviewed.    Past Medical History:   Diagnosis Date    Bronchitis     Closed disp fx of styloid process of left radius with routine healing     Last Assessed 2017    Depression     HIV (human immunodeficiency virus infection) ([facility])     Obesity, Class III, BMI 40-49.9 (morbid obesity) ([facility])     Psychiatric disorder     \"mood swings\"    Retained intrauterine contraceptive device (IUD) 2/15/2022    Seizure ([facility])     pt denies- blacksout     Past Surgical History:   Procedure Laterality Date     SECTION      [place] HYSTEROSCOPY BX ENDOMETRIUM&/POLYPC W/WO D&C N/A 02/15/2022    Procedure: DILATATION AND CURETTAGE (D&C) WITH HYSTEROSCOPY REMOVAL OF RETAINED IUD ARM;  Surgeon: Janet Millard MD;  Location: [facility];  Service: Gynecology    [place] INSERTION INTRAUTERINE DEVICE IUD N/A 02/15/2022    Procedure: INSERTION OF IUD MIRENA;  Surgeon: " Janet Millard MD;  Location: [facility] OR;  Service: Gynecology    WISDOM TOOTH EXTRACTION      all 4       HPI:  Wt Readings from Last 20 Encounters:   02/05/25 134 kg (295 lb 6.4 oz)   01/23/25 134 kg (294 lb 14.4 oz)   12/18/24 136 kg (300 lb)   12/17/24 (!) 136 kg (300 lb 3.2 oz)   12/04/24 134 kg (295 lb)   11/25/24 134 kg (295 lb)   10/21/24 133 kg (293 lb 6.4 oz)   09/18/24 132 kg (290 lb 8 oz)   06/26/24 (!) 137 kg (301 lb)   05/22/24 136 kg (300 lb)   05/03/24 (!) 137 kg (302 lb)   04/01/24 136 kg (298 lb 12.8 oz)   03/29/24 135 kg (298 lb 9.6 oz)   03/18/24 118 kg (260 lb)   02/26/24 (!) 137 kg (303 lb)   01/03/23 130 kg (286 lb)   10/25/22 126 kg (277 lb 14.4 oz)   10/04/22 129 kg (283 lb 12.8 oz)   07/21/22 128 kg (282 lb)   07/06/22 128 kg (281 lb 3.2 oz)       Patient presents today to medical weight management office for consult. She was being followed by our surgical team and was scheduled for weight loss surgery last month but it was cancelled as she needs a negative nicotine test. She is sent to [facility] in the meantime to discuss weight loss assistance.       Starting [facility] weight: 295 lbs   Starting BMI: 48 inches   Starting Waist Measurement: 52 inches   Goal weight: 180-190 to start     Obesity/Excess Weight:  Severity: Very Severe  Onset:  few year     Modifiers: Diet and Exercise and Commercial Weight Loss Programs-ie. Weight Watchers, [employer]ark[employer], Nutrisystem, etc.  Contributing factors: Poor Food Choices, Insufficient Physical Activity, Stress/Emotional Eating, Binge Eating, Lack of knowledge of appropriate lifestyle changes, and Insufficient time to make appropriate lifestyle changes  Associated symptoms: comorbid conditions, fatigue, increased joint pain, decreased exercise capacity, body image issues, decreased self esteem, decreased mobility, depression, inability to do certain activities, and clothes do not fit    Diet recall:  B: Coffee, skip eating or a protein shake   S:  nuts  L:  "Salad / protein   S: ice pops   D: Salad and protein shake   S: no  Take out frequency: rare    Hydration: Water 64 oz at least   Alcohol: no   Smoking: not any more quit last month   Exercise: Dancing     Sleep: 6-8 hours   STOP ban/8      The following portions of the patient's history were reviewed and updated as appropriate: allergies, current medications, past family history, past medical history, past social history, past surgical history, and problem list.    Family History   Problem Relation Age of Onset    Hypertension Mother     Deep vein thrombosis Father     Obesity Daughter     Breast cancer Neg Hx     Ovarian cancer Neg Hx     Uterine cancer Neg Hx     Cervical cancer Neg Hx         Review of Systems   Constitutional:  Negative for fatigue.   HENT:  Negative for sore throat.    Respiratory:  Negative for cough and shortness of breath.    Cardiovascular:  Negative for chest pain, palpitations and leg swelling.   Gastrointestinal:  Negative for abdominal pain, constipation, diarrhea, nausea and vomiting.   Genitourinary:  Negative for dysuria.   Musculoskeletal:  Negative for arthralgias and back pain.   Skin:  Negative for rash.   Neurological:  Negative for headaches.   Psychiatric/Behavioral:  Negative for dysphoric mood. The patient is not nervous/anxious.        Objective:  /82 (BP Location: Left arm, Patient Position: Sitting, Cuff Size: Large)   Pulse 69   Resp 16   Ht 5' 5.35\" (1.66 m)   Wt 134 kg (295 lb 6.4 oz)   BMI 48.63 kg/m²     Physical Exam  Vitals and nursing note reviewed.   Constitutional:       Appearance: Normal appearance. She is obese.   HENT:      Head: Normocephalic.   Pulmonary:      Effort: Pulmonary effort is normal.   Neurological:      Mental Status: She is oriented to person, place, and time.   Psychiatric:         Mood and Affect: Mood normal.         Behavior: Behavior normal.         Thought Content: Thought content normal.         Judgment: Judgment " normal.              Labs and Imaging  Recent labs and imaging have been personally reviewed.  Lab Results   Component Value Date    WBC 6.04 12/02/2024    HGB 12.8 12/02/2024    HCT 40.7 12/02/2024    MCV 92 12/02/2024     12/02/2024     Lab Results   Component Value Date    SODIUM 138 12/02/2024    K 4.4 12/02/2024     12/02/2024    CO2 30 12/02/2024    AGAP 3 (L) 12/02/2024    BUN 8 12/02/2024    CREATININE 0.72 12/02/2024    GLUC 86 02/11/2022    GLUF 95 12/02/2024    CALCIUM 9.6 12/02/2024    AST 18 12/02/2024    ALT 4 (L) 12/02/2024    ALKPHOS 67 12/02/2024    TP 7.5 12/02/2024    TBILI 0.33 12/02/2024    EGFR 109 12/02/2024     Lab Results   Component Value Date    HGBA1C 5.8 (H) 12/02/2024     Lab Results   Component Value Date    DBS8SHNWEXJP 3.948 03/20/2024     Lab Results   Component Value Date    CHOLESTEROL 151 03/20/2024     Lab Results   Component Value Date    HDL 35 (L) 03/20/2024     Lab Results   Component Value Date    TRIG 173 (H) 03/20/2024     Lab Results   Component Value Date    LDLCALC 81 03/20/2024

## 2025-02-05 NOTE — ASSESSMENT & PLAN NOTE
- Discussed options of HealthyCORE-Intensive Lifestyle Intervention Program, Very Low Calorie Diet-VLCD, Conservative Program, Keyur-En-Y Gastric Bypass, and Vertical Sleeve Gastrectomy and the role of weight loss medications.  - Patient is interested in pursuing Conservative Program and follow up visits with medical weight management provider.  - Explained the importance of making lifestyle changes in addition to starting any anti-obesity medications.   - Initial weight loss goal of 5-10% weight loss for improved health. Weight loss can improve patient's co-morbid conditions and/or prevent weight-related complications.  - Weight is not at goal and patient has been unable to achieve a meaningful weight loss above 5% using various programs and tools for more than 6 months  - Labs reviewed from 12/24    General Recommendations:  Nutrition:  Eat breakfast daily.  Do not skip meals.      Food log (ie.) www.[employer].com, sparkpeople.com, loseit.com, calorieking.com, etc.     Practice mindful eating.  Be sure to set aside time to eat, eat slowly, and savor your food.     Hydration:    At least 64oz of water daily.  No sugar sweetened beverages.  No juice (eat the fruit instead).     Exercise:  Studies have shown that the ideal exercise goal is somewhere between 150 to 300 minutes of moderate intensity exercise a week.  Start with exercising 10 minutes every other day and gradually increase physical activity with a goal of at least 150 minutes of moderate intensity exercise a week, divided over at least 3 days a week.  An example of this would be exercising 30 minutes a day, 5 days a week.  Resistance training can increase muscle mass and increase our resting metabolic rate.   FULL BODY resistance training is recommended 2-3 times a week.  Do not do this on consecutive days to allow for muscle recovery.     Aim for a bare minimum 5000 steps, even on days you do not exercise.     Monitoring:   Weigh yourself daily.  If  this causes undue stress, then just weigh yourself once a week.  Weigh yourself the same time of the day with the same amount of clothing on.  Preferably this should be done after waking up, before you eat, and with no clothing or minimal clothing on.     Specific Goals:  Calorie goal:  4199-0328 jamie/day (Provided with meal plan to follow). Discussed her nutrition and lifestyle at length and she has been maintaining her diet recommendations for the surgical dietician. She has been over a month nicotine free and was referred here in the mean time to discuss weight loss options other than surgical until she can get back on track with the surgical program. Discussed medications and GLP1s are not covered by her insurance and she cannot afford them. Discussed oral medications and could consider phentermine or Topamax or metformin however she says she really doesn't have an issue with appetite control. Would caution Wellbutrin in the setting of her zoloft dosage and depression. She is more interested in getting back on the surgical program as she feel she has good control of her appetite and diet right now. Recommend re consult with Dr. Anand for a plan.   Encouraged to start tracking food.   Eat at least 3 meals per day with a focus on protein. Do not skip meals. Protein rich snacks discussed and protein shake options discussed.  Exercise 1-2 days per week to start for 10-15 minutes per day and increase from there and include strength training as able.   Patient denies personal and family history of  pancreatitis, thyroid cancer, MEN-2 tumors.  Denies any hx of glaucoma, seizures, kidney stones, gallstones, or gastroparesis.   Denies Hx of CAD, PAD, palpitations, arrhythmia.   Denies uncontrolled anxiety or depression, suicidal behavior or thinking.   Denies insomnia or sleep disturbance.

## 2025-02-07 ENCOUNTER — TELEPHONE (OUTPATIENT)
Age: 35
End: 2025-02-07

## 2025-02-07 NOTE — TELEPHONE ENCOUNTER
Linda Oneill called  requested a call back to discuss scheduling an appt with Dafne Boyle when she returns.    They can be reached at P# [phone].       Thank you.

## 2025-02-10 ENCOUNTER — TELEPHONE (OUTPATIENT)
Dept: BARIATRICS | Facility: CLINIC | Age: 35
End: 2025-02-10

## 2025-04-15 ENCOUNTER — OFFICE VISIT (OUTPATIENT)
Dept: FAMILY MEDICINE CLINIC | Facility: CLINIC | Age: 35
End: 2025-04-15
Payer: COMMERCIAL

## 2025-04-15 VITALS
SYSTOLIC BLOOD PRESSURE: 120 MMHG | HEIGHT: 66 IN | RESPIRATION RATE: 18 BRPM | DIASTOLIC BLOOD PRESSURE: 80 MMHG | OXYGEN SATURATION: 98 % | BODY MASS INDEX: 46.77 KG/M2 | HEART RATE: 87 BPM | WEIGHT: 291 LBS | TEMPERATURE: 98.1 F

## 2025-04-15 DIAGNOSIS — E66.813 CLASS 3 SEVERE OBESITY DUE TO EXCESS CALORIES WITHOUT SERIOUS COMORBIDITY WITH BODY MASS INDEX (BMI) OF 45.0 TO 49.9 IN ADULT: ICD-10-CM

## 2025-04-15 DIAGNOSIS — F33.1 DEPRESSION, MAJOR, RECURRENT, MODERATE (HCC): ICD-10-CM

## 2025-04-15 DIAGNOSIS — Z00.00 ANNUAL PHYSICAL EXAM: Primary | ICD-10-CM

## 2025-04-15 DIAGNOSIS — Z23 NEED FOR COVID-19 VACCINE: ICD-10-CM

## 2025-04-15 DIAGNOSIS — F51.01 PRIMARY INSOMNIA: ICD-10-CM

## 2025-04-15 PROCEDURE — 91320 SARSCV2 VAC 30MCG TRS-SUC IM: CPT

## 2025-04-15 PROCEDURE — 99395 PREV VISIT EST AGE 18-39: CPT | Performed by: FAMILY MEDICINE

## 2025-04-15 PROCEDURE — 90480 ADMN SARSCOV2 VAC 1/ONLY CMP: CPT

## 2025-04-15 NOTE — ASSESSMENT & PLAN NOTE
Depression Screening Follow-up Plan: Patient's depression screening was positive with a PHQ-9 score of 9. Patient with underlying depression and was advised to continue current medications as prescribed.  Stable on sertraline

## 2025-04-15 NOTE — ASSESSMENT & PLAN NOTE
She is doing weight injection through [employer] website  She is to check with her insurance to see if they cover wegovy or zepbound   Continue diet, exercise and portion control      Orders:  •  Hemoglobin A1C  •  TSH, 3rd generation with Free T4 reflex

## 2025-04-15 NOTE — PROGRESS NOTES
Adult Annual Physical  Name: Linda Oneill      : 1990      MRN: [id number]  Encounter Provider: Grace Marx MD  Encounter Date: 4/15/2025   Encounter department: [facility]    :  Assessment & Plan  Annual physical exam    Orders:  •  Comprehensive metabolic panel  •  CBC and differential  •  Lipid panel  •  Hemoglobin A1C  •  TSH, 3rd generation with Free T4 reflex    Depression, major, recurrent, moderate (HCC)  Depression Screening Follow-up Plan: Patient's depression screening was positive with a PHQ-9 score of 9. Patient with underlying depression and was advised to continue current medications as prescribed.  Stable on sertraline           Primary insomnia  Tried melatonin   To try magnesium glycinate at bedtime        Class 3 severe obesity due to excess calories without serious comorbidity with body mass index (BMI) of 45.0 to 49.9 in adult (HCC)  She is doing weight injection through [employer] website  She is to check with her insurance to see if they cover wegovy or zepbound   Continue diet, exercise and portion control      Orders:  •  Hemoglobin A1C  •  TSH, 3rd generation with Free T4 reflex    Need for COVID-19 vaccine    Orders:  •  COVID-19 Pfizer mRNA vaccine 12 yr and older (Comirnaty pre-filled syringe)        Immunizations:  - Immunizations due: Influenza      Depression Screening and Follow-up Plan: Patient's depression screening was positive with a PHQ-9 score of 9.   Patient with underlying depression and was advised to continue current medications as prescribed.         History of Present Illness     Here for physical   Adult Annual Physical:  Patient presents for annual physical.     Diet and Physical Activity:  - Diet/Nutrition: no special diet.  - Exercise: no formal exercise and walking.    Depression Screening:    - PHQ-9 Score: 9    General Health:  - Sleep: 4-6 hours of sleep on average and snores loudly.  - Hearing: normal hearing bilateral ears.  - Vision:  "most recent eye exam < 1 year ago.  - Dental: brushes teeth twice daily and no dental visits for > 1 year.    /GYN Health:  - Follows with GYN: yes.   - Menopause: premenopausal.   - History of STDs: no  - Contraception: IUD placement.      Advanced Care Planning:  - Has an advanced directive?: no    - Has a durable medical POA?: yes    - ACP document given to patient?: no      Review of Systems   Constitutional:  Negative for chills and fever.   HENT:  Negative for ear pain and sore throat.    Eyes: Negative.  Negative for pain and visual disturbance.   Respiratory:  Negative for cough and shortness of breath.    Cardiovascular:  Negative for chest pain and palpitations.   Gastrointestinal:  Negative for abdominal pain and vomiting.   Endocrine: Negative.    Genitourinary:  Negative for dysuria and hematuria.   Musculoskeletal:  Negative for arthralgias and back pain.   Skin:  Negative for color change and rash.   Allergic/Immunologic: Negative.    Neurological:  Negative for seizures and syncope.   Hematological: Negative.    Psychiatric/Behavioral: Negative.     All other systems reviewed and are negative.    Medical History Reviewed by provider this encounter:  Allergies  Meds  Problems     .  Past Medical History   Past Medical History:   Diagnosis Date   • Abnormal ECG    • Anxiety    • Bronchitis    • Closed disp fx of styloid process of left radius with routine healing     Last Assessed 2017   • Depression    • HIV (human immunodeficiency virus infection) ([facility])    • Obesity, Class III, BMI 40-49.9 (morbid obesity) ([facility])    • Panic attack    • Psychiatric disorder     \"mood swings\"   • Retained intrauterine contraceptive device (IUD) 02/15/2022   • Seizure ([facility])     pt denies- blacksout   • Sleep difficulties     Most nights  but i sleep during the day  but wilner trying  to  stop it.     Past Surgical History:   Procedure Laterality Date   •  SECTION     • DENTAL SURGERY      4 wisdoms " teeth  where pulled.   • [place] HYSTEROSCOPY BX ENDOMETRIUM&/POLYPC W/WO D&C N/A 02/15/2022    Procedure: DILATATION AND CURETTAGE (D&C) WITH HYSTEROSCOPY REMOVAL OF RETAINED IUD ARM;  Surgeon: Janet Millard MD;  Location: [facility];  Service: Gynecology   • [place] INSERTION INTRAUTERINE DEVICE IUD N/A 02/15/2022    Procedure: INSERTION OF IUD MIRENA;  Surgeon: Janet Millard MD;  Location: [facility];  Service: Gynecology   • WISDOM TOOTH EXTRACTION      all 4     Family History   Problem Relation Age of Onset   • Hypertension Mother    • Deep vein thrombosis Father    • Obesity Daughter    • ADD / ADHD Maternal Grandmother    • Breast cancer Neg Hx    • Ovarian cancer Neg Hx    • Uterine cancer Neg Hx    • Cervical cancer Neg Hx       reports that she quit smoking about 15 years ago. Her smoking use included cigarettes. She has a 1.3 pack-year smoking history. She has never been exposed to tobacco smoke. She has never used smokeless tobacco. She reports that she does not currently use alcohol. She reports that she does not use drugs.  Current Outpatient Medications   Medication Instructions   • multivitamin (THERAGRAN) TABS 1 tablet, Oral, Daily   • sertraline (ZOLOFT) 150 mg, Oral, Daily   No Known Allergies   Current Outpatient Medications on File Prior to Visit   Medication Sig Dispense Refill   • multivitamin (THERAGRAN) TABS Take 1 tablet by mouth daily 30 tablet 0   • sertraline (ZOLOFT) 100 mg tablet Take 1.5 tablets (150 mg total) by mouth daily 135 tablet 0   • [DISCONTINUED] omeprazole (PriLOSEC) 20 mg delayed release capsule Take 1 capsule (20 mg total) by mouth 2 (two) times a day for 14 days 28 capsule 0     No current facility-administered medications on file prior to visit.      Social History     Tobacco Use   • Smoking status: Former     Current packs/day: 0.00     Average packs/day: 0.3 packs/day for 5.0 years (1.3 ttl pk-yrs)     Types: Cigarettes     Quit date: 1/1/2010     Years since  "quitting: 15.2     Passive exposure: Never   • Smokeless tobacco: Never   • Tobacco comments:     Vape   Vaping Use   • Vaping status: Former   • Substances: Nicotine, Flavoring   Substance and Sexual Activity   • Alcohol use: Not Currently     Comment: rarely   • Drug use: No   • Sexual activity: Not Currently     Partners: Female     Birth control/protection: I.U.D., None     Comment: On every other weekends       Objective   /80 (BP Location: Left arm, Patient Position: Sitting, Cuff Size: Standard)   Pulse 87   Temp 98.1 °F (36.7 °C) (Tympanic)   Resp 18   Ht 5' 6.14\" (1.68 m)   Wt 132 kg (291 lb)   SpO2 98%   BMI 46.77 kg/m²     Physical Exam  Vitals and nursing note reviewed.   Constitutional:       Appearance: Normal appearance. She is well-developed.   HENT:      Head: Normocephalic and atraumatic.      Right Ear: Tympanic membrane normal.      Left Ear: Tympanic membrane normal.      Nose: Nose normal.   Eyes:      Pupils: Pupils are equal, round, and reactive to light.   Neck:      Thyroid: No thyromegaly.   Cardiovascular:      Rate and Rhythm: Normal rate and regular rhythm.      Heart sounds: No murmur heard.  Pulmonary:      Effort: Pulmonary effort is normal.      Breath sounds: Normal breath sounds.   Abdominal:      General: Bowel sounds are normal.      Palpations: Abdomen is soft.   Musculoskeletal:         General: No deformity. Normal range of motion.      Cervical back: Normal range of motion and neck supple.   Skin:     General: Skin is warm.      Capillary Refill: Capillary refill takes less than 2 seconds.      Findings: No erythema or rash.   Neurological:      General: No focal deficit present.      Mental Status: She is alert and oriented to person, place, and time.      Deep Tendon Reflexes: Reflexes are normal and symmetric.   Psychiatric:         Mood and Affect: Mood normal.         Behavior: Behavior normal.     BMI Counseling: Body mass index is 46.77 kg/m². The BMI is " above normal. Nutrition recommendations include 3-5 servings of fruits/vegetables daily, reducing fast food intake, consuming healthier snacks, and moderation in carbohydrate intake. Exercise recommendations include moderate aerobic physical activity for 150 minutes/week.

## 2025-04-22 ENCOUNTER — APPOINTMENT (OUTPATIENT)
Dept: LAB | Facility: HOSPITAL | Age: 35
End: 2025-04-22
Payer: COMMERCIAL

## 2025-04-22 LAB
ALBUMIN SERPL BCG-MCNC: 3.9 G/DL (ref 3.5–5)
ALP SERPL-CCNC: 78 U/L (ref 34–104)
ALT SERPL W P-5'-P-CCNC: 5 U/L (ref 7–52)
ANION GAP SERPL CALCULATED.3IONS-SCNC: 4 MMOL/L (ref 4–13)
AST SERPL W P-5'-P-CCNC: 21 U/L (ref 13–39)
BASOPHILS # BLD AUTO: 0.04 THOUSANDS/ÂΜL (ref 0–0.1)
BASOPHILS NFR BLD AUTO: 1 % (ref 0–1)
BILIRUB SERPL-MCNC: 0.42 MG/DL (ref 0.2–1)
BUN SERPL-MCNC: 6 MG/DL (ref 5–25)
CALCIUM SERPL-MCNC: 9.6 MG/DL (ref 8.4–10.2)
CHLORIDE SERPL-SCNC: 105 MMOL/L (ref 96–108)
CHOLEST SERPL-MCNC: 165 MG/DL (ref ?–200)
CO2 SERPL-SCNC: 29 MMOL/L (ref 21–32)
CREAT SERPL-MCNC: 0.74 MG/DL (ref 0.6–1.3)
EOSINOPHIL # BLD AUTO: 0.23 THOUSAND/ÂΜL (ref 0–0.61)
EOSINOPHIL NFR BLD AUTO: 3 % (ref 0–6)
ERYTHROCYTE [DISTWIDTH] IN BLOOD BY AUTOMATED COUNT: 12.9 % (ref 11.6–15.1)
GFR SERPL CREATININE-BSD FRML MDRD: 106 ML/MIN/1.73SQ M
GLUCOSE P FAST SERPL-MCNC: 106 MG/DL (ref 65–99)
HCT VFR BLD AUTO: 39.7 % (ref 34.8–46.1)
HDLC SERPL-MCNC: 38 MG/DL
HGB BLD-MCNC: 12.8 G/DL (ref 11.5–15.4)
IMM GRANULOCYTES # BLD AUTO: 0.02 THOUSAND/UL (ref 0–0.2)
IMM GRANULOCYTES NFR BLD AUTO: 0 % (ref 0–2)
LDLC SERPL CALC-MCNC: 105 MG/DL (ref 0–100)
LYMPHOCYTES # BLD AUTO: 2.32 THOUSANDS/ÂΜL (ref 0.6–4.47)
LYMPHOCYTES NFR BLD AUTO: 30 % (ref 14–44)
MCH RBC QN AUTO: 29.1 PG (ref 26.8–34.3)
MCHC RBC AUTO-ENTMCNC: 32.2 G/DL (ref 31.4–37.4)
MCV RBC AUTO: 90 FL (ref 82–98)
MONOCYTES # BLD AUTO: 0.32 THOUSAND/ÂΜL (ref 0.17–1.22)
MONOCYTES NFR BLD AUTO: 4 % (ref 4–12)
NEUTROPHILS # BLD AUTO: 4.83 THOUSANDS/ÂΜL (ref 1.85–7.62)
NEUTS SEG NFR BLD AUTO: 62 % (ref 43–75)
NONHDLC SERPL-MCNC: 127 MG/DL
NRBC BLD AUTO-RTO: 0 /100 WBCS
PLATELET # BLD AUTO: 244 THOUSANDS/UL (ref 149–390)
PMV BLD AUTO: 10.3 FL (ref 8.9–12.7)
POTASSIUM SERPL-SCNC: 4.2 MMOL/L (ref 3.5–5.3)
PROT SERPL-MCNC: 7.6 G/DL (ref 6.4–8.4)
RBC # BLD AUTO: 4.4 MILLION/UL (ref 3.81–5.12)
SODIUM SERPL-SCNC: 138 MMOL/L (ref 135–147)
T4 FREE SERPL-MCNC: 0.74 NG/DL (ref 0.61–1.12)
TRIGL SERPL-MCNC: 109 MG/DL (ref ?–150)
TSH SERPL DL<=0.05 MIU/L-ACNC: 4.66 UIU/ML (ref 0.45–4.5)
WBC # BLD AUTO: 7.76 THOUSAND/UL (ref 4.31–10.16)

## 2025-04-22 PROCEDURE — 84439 ASSAY OF FREE THYROXINE: CPT | Performed by: FAMILY MEDICINE

## 2025-04-22 PROCEDURE — 84443 ASSAY THYROID STIM HORMONE: CPT | Performed by: FAMILY MEDICINE

## 2025-04-22 PROCEDURE — 83036 HEMOGLOBIN GLYCOSYLATED A1C: CPT | Performed by: FAMILY MEDICINE

## 2025-04-22 PROCEDURE — 80061 LIPID PANEL: CPT | Performed by: FAMILY MEDICINE

## 2025-04-22 PROCEDURE — 80053 COMPREHEN METABOLIC PANEL: CPT | Performed by: FAMILY MEDICINE

## 2025-04-22 PROCEDURE — 36415 COLL VENOUS BLD VENIPUNCTURE: CPT | Performed by: FAMILY MEDICINE

## 2025-04-22 PROCEDURE — 85025 COMPLETE CBC W/AUTO DIFF WBC: CPT | Performed by: FAMILY MEDICINE

## 2025-04-23 ENCOUNTER — RESULTS FOLLOW-UP (OUTPATIENT)
Dept: FAMILY MEDICINE CLINIC | Facility: CLINIC | Age: 35
End: 2025-04-23

## 2025-04-23 LAB
EST. AVERAGE GLUCOSE BLD GHB EST-MCNC: 117 MG/DL
HBA1C MFR BLD: 5.7 %

## 2025-05-07 ENCOUNTER — DOCUMENTATION (OUTPATIENT)
Dept: BEHAVIORAL/MENTAL HEALTH CLINIC | Facility: CLINIC | Age: 35
End: 2025-05-07

## 2025-05-07 NOTE — PROGRESS NOTES
TRANSFER SUMMARY    [facility]    Patient Name Linda Oneill     Date of Birth: 34 y.o. 1990      MRN: [id number]    Admission Date: several years ago    Date of Transfer: May 7, 2025    Admission Diagnosis:     Major Depressive Disorder, recurrent, moderate    Current Diagnosis:     No diagnosis found.    Reason for Admission: Linda presented for treatment due to depression. Primary complaints included DEPRESSIVE SYMPTOMS: depressed mood, low energy. Linda was self referred to work on decreasing depressive symptoms and working to increase energy and motivation.     Progress in Treatment: Linda was seen for Individual Couseling. During the course of treatment she work to increase engagement in the community, obtain employment and increase enjoyment in daily activities.     Episodes of Higher Level of Care: No    Transfer request Initiated by: Psychiatrist: PCP Dr Marx  Therapist: Dafne Boyle    Reason for Transfer Request:  insurance    Does this individual need a clinician with specialized training/expertise?: No    Is this client working with any other [facility] Providers/Therapists? Psychiatrist: None Therapist: None    Other pertinent issues: None    Are there any specific individuals who would be a “best fit” or who have already agreed to accept this transfer request?      Psychiatrist: None   Therapist: None  Rationale: Not Applicable    Attempts to maintain the current therapeutic relationship: Not Applicable    Transfer request routed to Clinical Supervisor for input and/or approval.     Comments from other involved providers and/or clinical coordinator : None    Dafne Boyle05/07/25

## 2025-05-24 DIAGNOSIS — F33.1 DEPRESSION, MAJOR, RECURRENT, MODERATE (HCC): ICD-10-CM

## 2025-05-25 RX ORDER — SERTRALINE HYDROCHLORIDE 100 MG/1
150 TABLET, FILM COATED ORAL DAILY
Qty: 135 TABLET | Refills: 0 | Status: SHIPPED | OUTPATIENT
Start: 2025-05-25

## 2025-05-28 ENCOUNTER — TELEPHONE (OUTPATIENT)
Age: 35
End: 2025-05-28

## 2025-05-28 DIAGNOSIS — F33.1 DEPRESSION, MAJOR, RECURRENT, MODERATE (HCC): ICD-10-CM

## 2025-05-28 DIAGNOSIS — Z12.83 SKIN CANCER SCREENING: Primary | ICD-10-CM

## 2025-05-28 RX ORDER — SERTRALINE HYDROCHLORIDE 100 MG/1
150 TABLET, FILM COATED ORAL DAILY
Qty: 135 TABLET | Refills: 0 | OUTPATIENT
Start: 2025-05-28

## 2025-05-28 NOTE — TELEPHONE ENCOUNTER
I put in referral for [facility]. She can see whoever her insurance allows her to see. She might have to call around for sooner appt

## 2025-05-28 NOTE — TELEPHONE ENCOUNTER
Patient called in to request a referral for dermatology.  She forgot to ask, while at her appointment on 4/15.    She wants to have her skin checked for cancer.    Please advise.

## 2025-05-30 ENCOUNTER — TELEPHONE (OUTPATIENT)
Dept: PSYCHIATRY | Facility: CLINIC | Age: 35
End: 2025-05-30

## 2025-05-30 NOTE — TELEPHONE ENCOUNTER
1st outreach attempt. Called and left message for patient to return a call to [phone] regarding MO appt for Therapy. Please transfer call to Psych Support Services upon return call.

## 2025-05-30 NOTE — TELEPHONE ENCOUNTER
Patient returning call for MO.    Writer successfully warm transferred patient to support services.

## 2025-05-30 NOTE — TELEPHONE ENCOUNTER
Received return call from patient who scheduled MO for 6/6 10AM in office. Patient will continue appts virtually.

## 2025-06-05 ENCOUNTER — TELEPHONE (OUTPATIENT)
Dept: PSYCHIATRY | Facility: CLINIC | Age: 35
End: 2025-06-05

## 2025-06-05 NOTE — TELEPHONE ENCOUNTER
Called secondary insurance to verify and PT's address is different from what the insurance company has. Writer called PT to inform to call secondary insurance and update address

## 2025-06-06 ENCOUNTER — OFFICE VISIT (OUTPATIENT)
Dept: BEHAVIORAL/MENTAL HEALTH CLINIC | Facility: CLINIC | Age: 35
End: 2025-06-06
Payer: COMMERCIAL

## 2025-06-06 DIAGNOSIS — F33.1 DEPRESSION, MAJOR, RECURRENT, MODERATE (HCC): Primary | ICD-10-CM

## 2025-06-06 PROCEDURE — 90832 PSYTX W PT 30 MINUTES: CPT

## 2025-06-06 NOTE — PSYCH
"Behavioral Health Psychotherapy Progress Note    Psychotherapy Provided: Individual Psychotherapy     1. Depression, major, recurrent, moderate (HCC)            Goals addressed in session: Goal 1     DATA: This writer met with patient for a MO appointment. Patient states she had achieved her goals and does not have current goals for psychotherapy. Patient requested to remain open \"if anything comes up I can call you and ask for a session\"  During this session, this clinician used the following therapeutic modalities: Client-centered Therapy    Substance Abuse was not addressed during this session. If the client is diagnosed with a co-occurring substance use disorder, please indicate any changes in the frequency or amount of use: N/A. Stage of change for addressing substance use diagnoses: No substance use/Not applicable    ASSESSMENT:  Linda Oneill presents with a Euthymic/ normal mood.     her affect is Normal range and intensity, which is congruent, with her mood and the content of the session. The client has made progress on their goals.    N/a Linda Oneill presents with a none risk of suicide, none risk of self-harm, and none risk of harm to others.    For any risk assessment that surpasses a \"low\" rating, a safety plan must be developed.    A safety plan was indicated: no  If yes, describe in detail N/A    PLAN: Between sessions, Linda Oneill will continue to use her coping skills to manage her stressors. At the next session, the therapist will use Supportive Psychotherapy.    Behavioral Health Treatment Plan and Discharge Planning: Linda Oneill is aware of and agrees to continue to work on their treatment plan. They have identified and are working toward their discharge goals. no    Depression Follow-up Plan Completed: No    Visit start and stop times:    06/08/25  Start Time: 1008  Stop Time: 1041  Total Visit Time: 33 minutes  "

## 2025-08-13 ENCOUNTER — APPOINTMENT (OUTPATIENT)
Dept: LAB | Facility: HOSPITAL | Age: 35
End: 2025-08-13
Payer: COMMERCIAL

## 2025-08-13 ENCOUNTER — ANNUAL EXAM (OUTPATIENT)
Dept: OBGYN CLINIC | Facility: CLINIC | Age: 35
End: 2025-08-13
Payer: COMMERCIAL

## 2025-08-13 DIAGNOSIS — Z11.3 SCREENING FOR STDS (SEXUALLY TRANSMITTED DISEASES): ICD-10-CM

## 2025-08-13 LAB
HBV SURFACE AG SER QL: NORMAL
HCV AB SER QL: NORMAL
TREPONEMA PALLIDUM IGG+IGM AB [PRESENCE] IN SERUM OR PLASMA BY IMMUNOASSAY: NORMAL

## 2025-08-13 PROCEDURE — 86780 TREPONEMA PALLIDUM: CPT

## 2025-08-13 PROCEDURE — 86803 HEPATITIS C AB TEST: CPT

## 2025-08-13 PROCEDURE — 87491 CHLMYD TRACH DNA AMP PROBE: CPT

## 2025-08-13 PROCEDURE — 87591 N.GONORRHOEAE DNA AMP PROB: CPT

## 2025-08-13 PROCEDURE — G0145 SCR C/V CYTO,THINLAYER,RESCR: HCPCS

## 2025-08-13 PROCEDURE — 87340 HEPATITIS B SURFACE AG IA: CPT

## 2025-08-13 PROCEDURE — 36415 COLL VENOUS BLD VENIPUNCTURE: CPT

## 2025-08-13 PROCEDURE — 87389 HIV-1 AG W/HIV-1&-2 AB AG IA: CPT

## 2025-08-14 LAB — HIV 1+2 AB+HIV1 P24 AG SERPL QL IA: NORMAL

## 2025-08-20 ENCOUNTER — OFFICE VISIT (OUTPATIENT)
Dept: OBGYN CLINIC | Facility: CLINIC | Age: 35
End: 2025-08-20
Payer: COMMERCIAL

## 2025-08-20 VITALS — SYSTOLIC BLOOD PRESSURE: 118 MMHG | BODY MASS INDEX: 47.41 KG/M2 | WEIGHT: 293 LBS | DIASTOLIC BLOOD PRESSURE: 78 MMHG

## 2025-08-20 DIAGNOSIS — N76.4 VULVAR ABSCESS: Primary | ICD-10-CM

## 2025-08-20 PROCEDURE — 99213 OFFICE O/P EST LOW 20 MIN: CPT

## (undated) DEVICE — CHLORHEXIDINE 4PCT 4 OZ

## (undated) DEVICE — SCD SEQUENTIAL COMPRESSION COMFORT SLEEVE MEDIUM KNEE LENGTH: Brand: KENDALL SCD

## (undated) DEVICE — CYSTO TUBING SINGLE IRRIGATION

## (undated) DEVICE — MAYO STAND COVER: Brand: CONVERTORS

## (undated) DEVICE — SPONGE 4 X 4 XRAY 16 PLY STRL LF RFD

## (undated) DEVICE — BETHLEHEM UNIVERSAL MINOR VAG: Brand: CARDINAL HEALTH

## (undated) DEVICE — GLOVE INDICATOR PI UNDERGLOVE SZ 6.5 BLUE

## (undated) DEVICE — DRAPE EQUIPMENT RF WAND

## (undated) DEVICE — PAD GROUNDING ADULT

## (undated) DEVICE — FINGER TUBING + CABLE MANAGER

## (undated) DEVICE — LIGHT HANDLE COVER SLEEVE DISP BLUE STELLAR

## (undated) DEVICE — STRL ALLENTOWN HYSTEROSCOPY PK: Brand: CARDINAL HEALTH

## (undated) DEVICE — PVC URETHRAL CATHETER: Brand: DOVER